# Patient Record
Sex: FEMALE | Race: WHITE | Employment: OTHER | URBAN - METROPOLITAN AREA
[De-identification: names, ages, dates, MRNs, and addresses within clinical notes are randomized per-mention and may not be internally consistent; named-entity substitution may affect disease eponyms.]

---

## 2017-03-15 ENCOUNTER — OFFICE VISIT (OUTPATIENT)
Dept: ORTHOPEDIC SURGERY | Age: 82
End: 2017-03-15

## 2017-03-15 VITALS — HEIGHT: 65 IN | RESPIRATION RATE: 16 BRPM | WEIGHT: 119 LBS | BODY MASS INDEX: 19.83 KG/M2

## 2017-03-15 DIAGNOSIS — S72.002D LEFT DISPLACED FEMORAL NECK FRACTURE, CLOSED, WITH ROUTINE HEALING, SUBSEQUENT ENCOUNTER: Primary | ICD-10-CM

## 2017-03-15 PROCEDURE — 4040F PNEUMOC VAC/ADMIN/RCVD: CPT | Performed by: ORTHOPAEDIC SURGERY

## 2017-03-15 PROCEDURE — 1123F ACP DISCUSS/DSCN MKR DOCD: CPT | Performed by: ORTHOPAEDIC SURGERY

## 2017-03-15 PROCEDURE — G8419 CALC BMI OUT NRM PARAM NOF/U: HCPCS | Performed by: ORTHOPAEDIC SURGERY

## 2017-03-15 PROCEDURE — G8427 DOCREV CUR MEDS BY ELIG CLIN: HCPCS | Performed by: ORTHOPAEDIC SURGERY

## 2017-03-15 PROCEDURE — G8484 FLU IMMUNIZE NO ADMIN: HCPCS | Performed by: ORTHOPAEDIC SURGERY

## 2017-03-15 PROCEDURE — 73502 X-RAY EXAM HIP UNI 2-3 VIEWS: CPT | Performed by: ORTHOPAEDIC SURGERY

## 2017-03-15 PROCEDURE — 99213 OFFICE O/P EST LOW 20 MIN: CPT | Performed by: ORTHOPAEDIC SURGERY

## 2017-03-15 PROCEDURE — 1090F PRES/ABSN URINE INCON ASSESS: CPT | Performed by: ORTHOPAEDIC SURGERY

## 2017-03-15 PROCEDURE — 1036F TOBACCO NON-USER: CPT | Performed by: ORTHOPAEDIC SURGERY

## 2017-04-13 ENCOUNTER — HOSPITAL ENCOUNTER (OUTPATIENT)
Dept: WOMENS IMAGING | Age: 82
Discharge: HOME | End: 2017-04-13
Attending: OBSTETRICS & GYNECOLOGY | Admitting: OBSTETRICS & GYNECOLOGY

## 2017-04-13 DIAGNOSIS — Z12.31 VISIT FOR SCREENING MAMMOGRAM: ICD-10-CM

## 2017-08-30 ENCOUNTER — OFFICE VISIT (OUTPATIENT)
Dept: ORTHOPEDIC SURGERY | Age: 82
End: 2017-08-30

## 2017-08-30 VITALS
WEIGHT: 105 LBS | DIASTOLIC BLOOD PRESSURE: 61 MMHG | SYSTOLIC BLOOD PRESSURE: 159 MMHG | HEIGHT: 65 IN | HEART RATE: 67 BPM | RESPIRATION RATE: 16 BRPM | BODY MASS INDEX: 17.49 KG/M2

## 2017-08-30 DIAGNOSIS — S72.002A LEFT DISPLACED FEMORAL NECK FRACTURE, CLOSED, INITIAL ENCOUNTER: Primary | ICD-10-CM

## 2017-08-30 PROCEDURE — 1036F TOBACCO NON-USER: CPT | Performed by: ORTHOPAEDIC SURGERY

## 2017-08-30 PROCEDURE — 4040F PNEUMOC VAC/ADMIN/RCVD: CPT | Performed by: ORTHOPAEDIC SURGERY

## 2017-08-30 PROCEDURE — G8419 CALC BMI OUT NRM PARAM NOF/U: HCPCS | Performed by: ORTHOPAEDIC SURGERY

## 2017-08-30 PROCEDURE — 1090F PRES/ABSN URINE INCON ASSESS: CPT | Performed by: ORTHOPAEDIC SURGERY

## 2017-08-30 PROCEDURE — 99213 OFFICE O/P EST LOW 20 MIN: CPT | Performed by: ORTHOPAEDIC SURGERY

## 2017-08-30 PROCEDURE — 1123F ACP DISCUSS/DSCN MKR DOCD: CPT | Performed by: ORTHOPAEDIC SURGERY

## 2017-08-30 PROCEDURE — G8427 DOCREV CUR MEDS BY ELIG CLIN: HCPCS | Performed by: ORTHOPAEDIC SURGERY

## 2019-06-10 ENCOUNTER — TELEPHONE (OUTPATIENT)
Dept: ORTHOPEDIC SURGERY | Age: 84
End: 2019-06-10

## 2019-06-10 NOTE — TELEPHONE ENCOUNTER
Shonna bashir/Dr. Berger People needs to know if patient needs an antibiotic prior to dental work. She is requesting that antibiotic type and directions be faxed to her.     Fax: [phone]    Questions call Shonna:  [phone]

## 2019-06-10 NOTE — TELEPHONE ENCOUNTER
Called and spoke to  Ilir at dental office. Informed her patient does not need abx prior to procedure.  Letter created and faxed to number below

## 2019-06-10 NOTE — LETTER
[facility]  [address] [facility] ([facility]) [address]  Phone: [phone]  Fax: [phone]    Rich Milligan MD        Emmie 10, 2019      To whom it may concern,. It is my medical opinion that [place] Ally does not need an antibiotic prior to having a dental procedure. If there are any questions please do not hesitate to contact our office.       Sincerely,    Rich Milligan MD

## 2020-01-02 ENCOUNTER — HOSPITAL ENCOUNTER (INPATIENT)
Age: 85
LOS: 4 days | Discharge: HOME | DRG: 193 | End: 2020-01-06
Attending: INTERNAL MEDICINE | Admitting: INTERNAL MEDICINE
Payer: MEDICARE

## 2020-01-02 ENCOUNTER — APPOINTMENT (OUTPATIENT)
Dept: GENERAL RADIOLOGY | Age: 85
DRG: 193 | End: 2020-01-02
Payer: MEDICARE

## 2020-01-02 PROBLEM — J15.9 COMMUNITY ACQUIRED BACTERIAL PNEUMONIA: Status: ACTIVE | Noted: 2020-01-02

## 2020-01-02 LAB
ANION GAP SERPL CALCULATED.3IONS-SCNC: 20 MMOL/L (ref 3–16)
BASOPHILS ABSOLUTE: 0 K/UL (ref 0–0.2)
BASOPHILS RELATIVE PERCENT: 0.2 %
BUN BLDV-MCNC: 15 MG/DL (ref 7–20)
CALCIUM SERPL-MCNC: 9.3 MG/DL (ref 8.3–10.6)
CHLORIDE BLD-SCNC: 98 MMOL/L (ref 99–110)
CO2: 16 MMOL/L (ref 21–32)
CREAT SERPL-MCNC: 0.6 MG/DL (ref 0.6–1.2)
EKG ATRIAL RATE: 113 BPM
EKG DIAGNOSIS: NORMAL
EKG P AXIS: 58 DEGREES
EKG P-R INTERVAL: 172 MS
EKG Q-T INTERVAL: 358 MS
EKG QRS DURATION: 92 MS
EKG QTC CALCULATION (BAZETT): 491 MS
EKG R AXIS: -27 DEGREES
EKG T AXIS: 23 DEGREES
EKG VENTRICULAR RATE: 113 BPM
EOSINOPHILS ABSOLUTE: 0 K/UL (ref 0–0.6)
EOSINOPHILS RELATIVE PERCENT: 0.2 %
GFR AFRICAN AMERICAN: >60
GFR NON-AFRICAN AMERICAN: >60
GLUCOSE BLD-MCNC: 101 MG/DL (ref 70–99)
HCT VFR BLD CALC: 39 % (ref 36–48)
HEMOGLOBIN: 12.9 G/DL (ref 12–16)
LYMPHOCYTES ABSOLUTE: 0.8 K/UL (ref 1–5.1)
LYMPHOCYTES RELATIVE PERCENT: 10.1 %
MAGNESIUM: 2 MG/DL (ref 1.8–2.4)
MCH RBC QN AUTO: 31.1 PG (ref 26–34)
MCHC RBC AUTO-ENTMCNC: 33.1 G/DL (ref 31–36)
MCV RBC AUTO: 94.2 FL (ref 80–100)
MONOCYTES ABSOLUTE: 0.9 K/UL (ref 0–1.3)
MONOCYTES RELATIVE PERCENT: 12.1 %
NEUTROPHILS ABSOLUTE: 6 K/UL (ref 1.7–7.7)
NEUTROPHILS RELATIVE PERCENT: 77.4 %
PDW BLD-RTO: 14.4 % (ref 12.4–15.4)
PLATELET # BLD: 237 K/UL (ref 135–450)
PMV BLD AUTO: 7.2 FL (ref 5–10.5)
POTASSIUM REFLEX MAGNESIUM: 3.4 MMOL/L (ref 3.5–5.1)
PRO-BNP: 661 PG/ML (ref 0–449)
RBC # BLD: 4.15 M/UL (ref 4–5.2)
SODIUM BLD-SCNC: 134 MMOL/L (ref 136–145)
TROPONIN: <0.01 NG/ML
WBC # BLD: 7.8 K/UL (ref 4–11)

## 2020-01-02 PROCEDURE — 93010 ELECTROCARDIOGRAM REPORT: CPT | Performed by: INTERNAL MEDICINE

## 2020-01-02 PROCEDURE — 96365 THER/PROPH/DIAG IV INF INIT: CPT

## 2020-01-02 PROCEDURE — 80048 BASIC METABOLIC PNL TOTAL CA: CPT

## 2020-01-02 PROCEDURE — 6370000000 HC RX 637 (ALT 250 FOR IP): Performed by: NURSE PRACTITIONER

## 2020-01-02 PROCEDURE — 83735 ASSAY OF MAGNESIUM: CPT

## 2020-01-02 PROCEDURE — 96367 TX/PROPH/DG ADDL SEQ IV INF: CPT

## 2020-01-02 PROCEDURE — 83880 ASSAY OF NATRIURETIC PEPTIDE: CPT

## 2020-01-02 PROCEDURE — 2580000003 HC RX 258: Performed by: NURSE PRACTITIONER

## 2020-01-02 PROCEDURE — 94640 AIRWAY INHALATION TREATMENT: CPT

## 2020-01-02 PROCEDURE — 2580000003 HC RX 258: Performed by: INTERNAL MEDICINE

## 2020-01-02 PROCEDURE — 6360000002 HC RX W HCPCS: Performed by: NURSE PRACTITIONER

## 2020-01-02 PROCEDURE — 1200000000 HC SEMI PRIVATE

## 2020-01-02 PROCEDURE — 96375 TX/PRO/DX INJ NEW DRUG ADDON: CPT

## 2020-01-02 PROCEDURE — 71046 X-RAY EXAM CHEST 2 VIEWS: CPT

## 2020-01-02 PROCEDURE — 93005 ELECTROCARDIOGRAM TRACING: CPT | Performed by: EMERGENCY MEDICINE

## 2020-01-02 PROCEDURE — 84484 ASSAY OF TROPONIN QUANT: CPT

## 2020-01-02 PROCEDURE — 6360000002 HC RX W HCPCS: Performed by: INTERNAL MEDICINE

## 2020-01-02 PROCEDURE — 85025 COMPLETE CBC W/AUTO DIFF WBC: CPT

## 2020-01-02 PROCEDURE — 87252 VIRUS INOCULATION TISSUE: CPT

## 2020-01-02 PROCEDURE — 99285 EMERGENCY DEPT VISIT HI MDM: CPT

## 2020-01-02 PROCEDURE — 6370000000 HC RX 637 (ALT 250 FOR IP): Performed by: INTERNAL MEDICINE

## 2020-01-02 RX ORDER — SODIUM CHLORIDE 0.9 % (FLUSH) 0.9 %
10 SYRINGE (ML) INJECTION PRN
Status: DISCONTINUED | OUTPATIENT
Start: 2020-01-02 | End: 2020-01-06 | Stop reason: HOSPADM

## 2020-01-02 RX ORDER — SODIUM CHLORIDE 0.9 % (FLUSH) 0.9 %
10 SYRINGE (ML) INJECTION EVERY 12 HOURS SCHEDULED
Status: DISCONTINUED | OUTPATIENT
Start: 2020-01-02 | End: 2020-01-06 | Stop reason: HOSPADM

## 2020-01-02 RX ORDER — SIMVASTATIN 20 MG
20 TABLET ORAL NIGHTLY
Status: DISCONTINUED | OUTPATIENT
Start: 2020-01-02 | End: 2020-01-06 | Stop reason: HOSPADM

## 2020-01-02 RX ORDER — LEVOFLOXACIN 5 MG/ML
750 INJECTION, SOLUTION INTRAVENOUS
Status: DISCONTINUED | OUTPATIENT
Start: 2020-01-03 | End: 2020-01-03

## 2020-01-02 RX ORDER — TIMOLOL MALEATE 6.8 MG/ML
1 SOLUTION/ DROPS OPHTHALMIC 2 TIMES DAILY
Status: ON HOLD | COMMUNITY
End: 2021-11-13

## 2020-01-02 RX ORDER — LISINOPRIL 10 MG/1
20 TABLET ORAL DAILY
Status: DISCONTINUED | OUTPATIENT
Start: 2020-01-03 | End: 2020-01-06 | Stop reason: HOSPADM

## 2020-01-02 RX ORDER — BENAZEPRIL HYDROCHLORIDE 20 MG/1
20 TABLET ORAL DAILY
COMMUNITY
Start: 2019-11-07

## 2020-01-02 RX ORDER — IPRATROPIUM BROMIDE AND ALBUTEROL SULFATE 2.5; .5 MG/3ML; MG/3ML
1 SOLUTION RESPIRATORY (INHALATION) ONCE
Status: COMPLETED | OUTPATIENT
Start: 2020-01-02 | End: 2020-01-02

## 2020-01-02 RX ORDER — TITANIUM DIOXIDE, OCTINOXATE, ZINC OXIDE 4.61; 1.6; .78 G/40ML; G/40ML; G/40ML
1 CREAM TOPICAL DAILY
COMMUNITY
End: 2020-07-09

## 2020-01-02 RX ORDER — BIMATOPROST 0.01 %
1 DROPS OPHTHALMIC (EYE) EVERY EVENING
COMMUNITY
Start: 2019-11-04

## 2020-01-02 RX ORDER — LEVOFLOXACIN 5 MG/ML
750 INJECTION, SOLUTION INTRAVENOUS EVERY 24 HOURS
Status: DISCONTINUED | OUTPATIENT
Start: 2020-01-03 | End: 2020-01-02 | Stop reason: DRUGHIGH

## 2020-01-02 RX ORDER — AMLODIPINE BESYLATE 5 MG/1
5 TABLET ORAL DAILY
Status: DISCONTINUED | OUTPATIENT
Start: 2020-01-02 | End: 2020-01-06 | Stop reason: HOSPADM

## 2020-01-02 RX ORDER — LEVOTHYROXINE SODIUM 0.07 MG/1
75 TABLET ORAL DAILY
Status: DISCONTINUED | OUTPATIENT
Start: 2020-01-02 | End: 2020-01-06 | Stop reason: HOSPADM

## 2020-01-02 RX ORDER — ONDANSETRON 2 MG/ML
4 INJECTION INTRAMUSCULAR; INTRAVENOUS EVERY 6 HOURS PRN
Status: DISCONTINUED | OUTPATIENT
Start: 2020-01-02 | End: 2020-01-06 | Stop reason: HOSPADM

## 2020-01-02 RX ORDER — METHYLPREDNISOLONE SODIUM SUCCINATE 125 MG/2ML
60 INJECTION, POWDER, LYOPHILIZED, FOR SOLUTION INTRAMUSCULAR; INTRAVENOUS ONCE
Status: COMPLETED | OUTPATIENT
Start: 2020-01-02 | End: 2020-01-02

## 2020-01-02 RX ORDER — ALBUTEROL SULFATE 2.5 MG/3ML
2.5 SOLUTION RESPIRATORY (INHALATION)
Status: DISCONTINUED | OUTPATIENT
Start: 2020-01-02 | End: 2020-01-06 | Stop reason: HOSPADM

## 2020-01-02 RX ORDER — ASPIRIN 81 MG/1
81 TABLET, CHEWABLE ORAL DAILY
Status: DISCONTINUED | OUTPATIENT
Start: 2020-01-02 | End: 2020-01-06 | Stop reason: HOSPADM

## 2020-01-02 RX ORDER — NETARSUDIL 0.2 MG/ML
1 SOLUTION/ DROPS OPHTHALMIC; TOPICAL NIGHTLY
Status: ON HOLD | COMMUNITY
Start: 2020-01-02 | End: 2021-11-14

## 2020-01-02 RX ADMIN — ASPIRIN 81 MG 81 MG: 81 TABLET ORAL at 18:45

## 2020-01-02 RX ADMIN — SODIUM CHLORIDE, PRESERVATIVE FREE 10 ML: 5 INJECTION INTRAVENOUS at 20:52

## 2020-01-02 RX ADMIN — METHYLPREDNISOLONE SODIUM SUCCINATE 60 MG: 125 INJECTION, POWDER, FOR SOLUTION INTRAMUSCULAR; INTRAVENOUS at 12:39

## 2020-01-02 RX ADMIN — AZITHROMYCIN MONOHYDRATE 500 MG: 500 INJECTION, POWDER, LYOPHILIZED, FOR SOLUTION INTRAVENOUS at 13:37

## 2020-01-02 RX ADMIN — IPRATROPIUM BROMIDE AND ALBUTEROL SULFATE 1 AMPULE: .5; 3 SOLUTION RESPIRATORY (INHALATION) at 13:11

## 2020-01-02 RX ADMIN — SIMVASTATIN 20 MG: 20 TABLET, FILM COATED ORAL at 20:50

## 2020-01-02 RX ADMIN — Medication 10 ML: at 20:50

## 2020-01-02 RX ADMIN — LEVOTHYROXINE SODIUM 75 MCG: 75 TABLET ORAL at 18:45

## 2020-01-02 RX ADMIN — AMLODIPINE BESYLATE 5 MG: 5 TABLET ORAL at 18:45

## 2020-01-02 RX ADMIN — ENOXAPARIN SODIUM 40 MG: 40 INJECTION SUBCUTANEOUS at 20:49

## 2020-01-02 RX ADMIN — MAGNESIUM HYDROXIDE 30 ML: 400 SUSPENSION ORAL at 21:41

## 2020-01-02 RX ADMIN — CEFTRIAXONE 1 G: 1 INJECTION, POWDER, FOR SOLUTION INTRAMUSCULAR; INTRAVENOUS at 12:54

## 2020-01-02 ASSESSMENT — ENCOUNTER SYMPTOMS
COUGH: 1
GASTROINTESTINAL NEGATIVE: 1
SHORTNESS OF BREATH: 1

## 2020-01-02 ASSESSMENT — PAIN SCALES - GENERAL
PAINLEVEL_OUTOF10: 0
PAINLEVEL_OUTOF10: 0

## 2020-01-02 NOTE — PLAN OF CARE
Problem: Falls - Risk of:  Goal: Will remain free from falls  Description  Will remain free from falls  Outcome: Ongoing  Goal: Absence of physical injury  Description  Absence of physical injury  Outcome: Ongoing     Problem: Discharge Planning:  Goal: Discharged to appropriate level of care  Description  Discharged to appropriate level of care  Outcome: Ongoing     Problem: Skin Integrity:  Goal: Will show no infection signs and symptoms  Description  Will show no infection signs and symptoms  Outcome: Ongoing  Goal: Absence of new skin breakdown  Description  Absence of new skin breakdown  Outcome: Ongoing

## 2020-01-02 NOTE — H&P
Hospital Medicine History & Physical      PCP: Codey Abdi MD    Date of Admission: 1/2/2020    Date of Service: Pt seen/examined on 1/2/20 and Admitted to Inpatient  Chief Complaint:  sob      History Of Present Illness: The patient is a [address] y.o. female who presents to [facility] with acute onset and progressive course of sob. Sob started one week ago sob worse with exertion no relieving factors sob associated with cough no fever chills cp     Past Medical History:        Diagnosis Date    Arthritis     Glaucoma     Hyperlipidemia     Hypertension     TIA (transient ischemic attack)        Past Surgical History:        Procedure Laterality Date    BREAST SURGERY      left lumpectomy    HIP ARTHROPLASTY Left 08/26/2016    hemiarthroplasty left hip    THYROIDECTOMY, PARTIAL      VARICOSE VEIN SURGERY         Medications Prior to Admission:    Prior to Admission medications    Medication Sig Start Date End Date Taking? Authorizing Provider   ferrous sulfate 324 (65 FE) MG EC tablet Take 1 tablet by mouth daily (with breakfast) 9/7/16   Junior High Lina MD   amLODIPine (NORVASC) 5 MG tablet Take 1 tablet by mouth daily 8/28/16   Laila Hanley MD   lisinopril (PRINIVIL;ZESTRIL) 20 MG tablet Take 1 tablet by mouth daily 8/28/16   Laila Hanley MD   docusate sodium (COLACE, DULCOLAX) 100 MG CAPS Take 100 mg by mouth 2 times daily 8/28/16   Laila Hanley MD   latanoprost (XALATAN) 0.005 % ophthalmic solution Place 1 drop into the left eye nightly 8/28/16   Laila Hanley MD   levothyroxine (SYNTHROID) 75 MCG tablet Take 75 mcg by mouth Daily    Historical Provider, MD   clobetasol (TEMOVATE) 0.05 % cream Apply topically daily Apply topically 2 times daily. Historical Provider, MD   aspirin 81 MG tablet Take 81 mg by mouth daily.

## 2020-01-02 NOTE — ED PROVIDER NOTES
LABS:  Labs Reviewed   CBC WITH AUTO DIFFERENTIAL - Abnormal; Notable for the following components:       Result Value    Lymphocytes Absolute 0.8 (*)     All other components within normal limits    Narrative:     Performed at:  [facility]  [address] [facility] [place] [place] [employer] [address]   Phone [phone]   BASIC METABOLIC PANEL W/ REFLEX TO MG FOR LOW K - Abnormal; Notable for the following components:    Sodium 134 (*)     Potassium reflex Magnesium 3.4 (*)     Chloride 98 (*)     CO2 16 (*)     Anion Gap 20 (*)     Glucose 101 (*)     All other components within normal limits    Narrative:     Performed at:  [facility]  [address] [facility] [place] [place] [employer] [address]   Phone [phone]   BRAIN NATRIURETIC PEPTIDE - Abnormal; Notable for the following components:    Pro- (*)     All other components within normal limits    Narrative:     Performed at:  [address] [place] [place] [employer] [address]   Phone [phone]   RESP VIRUSES DFA PANEL   CULTURE, VIRAL RESPIRATORY   TROPONIN    Narrative:     Performed at:  [address] [place] [employer] [address]   Phone [phone]   MAGNESIUM    Narrative:     Performed at:  [address] [place] [address] [place] [employer] [address]   Phone [phone]       All other labs were within normal range or not returned as of this dictation.     EMERGENCY DEPARTMENT COURSE and DIFFERENTIAL DIAGNOSIS/MDM:   Vitals:    Vitals:    01/02/20 1231 01/02/20 1247 01/02/20 1301 01/02/20 1311   BP: (!) 135/91 (!) 156/86 (!) 165/92    Pulse: 114 108 109    Resp: 18 20 19 20   Temp:       TempSrc:       SpO2: 91% 94% 96% 96%     Medications   azithromycin (ZITHROMAX) 500 mg in D5W 250ml addavial (has no administration in time range)   ipratropium-albuterol (DUONEB) nebulizer solution 1 ampule (1 ampule Inhalation Given 1/2/20 1311)   cefTRIAXone (ROCEPHIN) 1 g IVPB in 50 mL D5W minibag (1 g Intravenous New Bag 1/2/20 1254)   methylPREDNISolone sodium (SOLU-MEDROL) injection 60 mg (60 mg Intravenous Given 1/2/20 1239)       MDM   Patient was seen and evaluated per myself. Dr. Kashmir Reed present available for consultation as needed. On clinical exam the patient is awake alert and oriented. Slightly tachypneic, on 2 L of oxygen nasal cannula satting 95 to 96% however during conversation the O2 sat will drop to the level of 90-91. On room air she was 88%. Lung fields positive for coarse rhonchi in the left lower base and slightly present in the right lower base. She has a congested barking cough. Abdomen soft nontender nondistended. She is afebrile mildly tachycardic narrow complex. Differential diagnosis: RSV, pertussis, pneumonia, sepsis, influenza    Rapid influenza swab and respiratory viral panel swabs have been ordered, will be processed. Nebulizer, azithromycin, Rocephin for community-acquired pneumonia as well as Solu-Medrol 60 mg IV is also been ordered. Chest x-ray is positive for a left lower lobe complex bullae, has been present in the past in 2006 however there may be a component of infection with that as well. Given her tachycardia, cough, hypoxia I will treat as a community-acquired pneumonia. White blood cell count within normal limits. Troponin less than 0.01, magnesium normal.  Metabolic panel: Bicarb 16, anion gap 20. CURB 65: score: 1 (age)  PSI score: 100 points (increased complication and mortality risk)    Based on the patient's clinical presentation, age and increased risk for mortality and complication I will consult with hospitalist service for admission. This dictation was performed with a verbal recognition program (DRAGON) and it was checked for errors. It is possible that there are still dictated errors within this office note.  If so, please bring any errors to my attention for an addendum. All efforts were made to ensure that this office note is accurate. CONSULTS:  IP CONSULT TO HOSPITALIST    PROCEDURES:  Procedures    FINAL IMPRESSION      1. Pneumonia of left lower lobe due to infectious organism ([address]    2. Hypoxia    3. Tachycardia    4. Cough    5. Shortness of breath          DISPOSITION/PLAN   [unfilled]    PATIENT REFERRED TO:  No follow-up provider specified.     DISCHARGE MEDICATIONS:  New Prescriptions    No medications on file       (Please note that portions of this note were completed with a voice recognition program.  Efforts were made to edit the dictations but occasionally words are mis-transcribed.)    Amada Quiles, [address], APRN - [facility]  01/02/20 3968

## 2020-01-03 PROBLEM — J18.9 COMMUNITY ACQUIRED PNEUMONIA: Status: ACTIVE | Noted: 2020-01-02

## 2020-01-03 PROBLEM — E87.29 HIGH ANION GAP METABOLIC ACIDOSIS: Status: ACTIVE | Noted: 2020-01-03

## 2020-01-03 PROBLEM — J96.01 ACUTE RESPIRATORY FAILURE WITH HYPOXIA (HCC): Status: ACTIVE | Noted: 2020-01-03

## 2020-01-03 LAB
ANION GAP SERPL CALCULATED.3IONS-SCNC: 14 MMOL/L (ref 3–16)
BASOPHILS ABSOLUTE: 0 K/UL (ref 0–0.2)
BASOPHILS RELATIVE PERCENT: 0.1 %
BUN BLDV-MCNC: 20 MG/DL (ref 7–20)
CALCIUM SERPL-MCNC: 8.9 MG/DL (ref 8.3–10.6)
CHLORIDE BLD-SCNC: 98 MMOL/L (ref 99–110)
CO2: 23 MMOL/L (ref 21–32)
CREAT SERPL-MCNC: 0.7 MG/DL (ref 0.6–1.2)
EOSINOPHILS ABSOLUTE: 0 K/UL (ref 0–0.6)
EOSINOPHILS RELATIVE PERCENT: 0.1 %
GFR AFRICAN AMERICAN: >60
GFR NON-AFRICAN AMERICAN: >60
GLUCOSE BLD-MCNC: 102 MG/DL (ref 70–99)
HCT VFR BLD CALC: 36.1 % (ref 36–48)
HEMOGLOBIN: 11.9 G/DL (ref 12–16)
LYMPHOCYTES ABSOLUTE: 1.2 K/UL (ref 1–5.1)
LYMPHOCYTES RELATIVE PERCENT: 11.7 %
MCH RBC QN AUTO: 31 PG (ref 26–34)
MCHC RBC AUTO-ENTMCNC: 33 G/DL (ref 31–36)
MCV RBC AUTO: 93.8 FL (ref 80–100)
MONOCYTES ABSOLUTE: 1.2 K/UL (ref 0–1.3)
MONOCYTES RELATIVE PERCENT: 11.9 %
NEUTROPHILS ABSOLUTE: 7.8 K/UL (ref 1.7–7.7)
NEUTROPHILS RELATIVE PERCENT: 76.2 %
PDW BLD-RTO: 14.5 % (ref 12.4–15.4)
PLATELET # BLD: 238 K/UL (ref 135–450)
PMV BLD AUTO: 7.5 FL (ref 5–10.5)
POTASSIUM SERPL-SCNC: 3.2 MMOL/L (ref 3.5–5.1)
RBC # BLD: 3.84 M/UL (ref 4–5.2)
SODIUM BLD-SCNC: 135 MMOL/L (ref 136–145)
WBC # BLD: 10.2 K/UL (ref 4–11)

## 2020-01-03 PROCEDURE — 36415 COLL VENOUS BLD VENIPUNCTURE: CPT

## 2020-01-03 PROCEDURE — 99223 1ST HOSP IP/OBS HIGH 75: CPT | Performed by: INTERNAL MEDICINE

## 2020-01-03 PROCEDURE — 97530 THERAPEUTIC ACTIVITIES: CPT

## 2020-01-03 PROCEDURE — 1200000000 HC SEMI PRIVATE

## 2020-01-03 PROCEDURE — 6370000000 HC RX 637 (ALT 250 FOR IP): Performed by: INTERNAL MEDICINE

## 2020-01-03 PROCEDURE — 2700000000 HC OXYGEN THERAPY PER DAY

## 2020-01-03 PROCEDURE — 97161 PT EVAL LOW COMPLEX 20 MIN: CPT

## 2020-01-03 PROCEDURE — 94761 N-INVAS EAR/PLS OXIMETRY MLT: CPT

## 2020-01-03 PROCEDURE — 80048 BASIC METABOLIC PNL TOTAL CA: CPT

## 2020-01-03 PROCEDURE — 6360000002 HC RX W HCPCS: Performed by: INTERNAL MEDICINE

## 2020-01-03 PROCEDURE — 85025 COMPLETE CBC W/AUTO DIFF WBC: CPT

## 2020-01-03 PROCEDURE — 2580000003 HC RX 258: Performed by: INTERNAL MEDICINE

## 2020-01-03 RX ORDER — CALCIUM CARBONATE 200(500)MG
500 TABLET,CHEWABLE ORAL 3 TIMES DAILY PRN
Status: DISCONTINUED | OUTPATIENT
Start: 2020-01-03 | End: 2020-01-06 | Stop reason: HOSPADM

## 2020-01-03 RX ORDER — MONTELUKAST SODIUM 10 MG/1
10 TABLET ORAL NIGHTLY
Status: DISCONTINUED | OUTPATIENT
Start: 2020-01-03 | End: 2020-01-06 | Stop reason: HOSPADM

## 2020-01-03 RX ORDER — DORZOLAMIDE HCL 20 MG/ML
1 SOLUTION/ DROPS OPHTHALMIC 3 TIMES DAILY
Status: DISCONTINUED | OUTPATIENT
Start: 2020-01-03 | End: 2020-01-03

## 2020-01-03 RX ORDER — DOXYCYCLINE HYCLATE 100 MG
100 TABLET ORAL EVERY 12 HOURS SCHEDULED
Status: DISCONTINUED | OUTPATIENT
Start: 2020-01-03 | End: 2020-01-06 | Stop reason: HOSPADM

## 2020-01-03 RX ORDER — POTASSIUM CHLORIDE 750 MG/1
40 TABLET, FILM COATED, EXTENDED RELEASE ORAL ONCE
Status: COMPLETED | OUTPATIENT
Start: 2020-01-03 | End: 2020-01-03

## 2020-01-03 RX ORDER — FLUTICASONE PROPIONATE 50 MCG
2 SPRAY, SUSPENSION (ML) NASAL DAILY
Status: DISCONTINUED | OUTPATIENT
Start: 2020-01-03 | End: 2020-01-06 | Stop reason: HOSPADM

## 2020-01-03 RX ORDER — DORZOLAMIDE HYDROCHLORIDE AND TIMOLOL MALEATE 20; 5 MG/ML; MG/ML
1 SOLUTION/ DROPS OPHTHALMIC 2 TIMES DAILY
Status: DISCONTINUED | OUTPATIENT
Start: 2020-01-03 | End: 2020-01-06 | Stop reason: HOSPADM

## 2020-01-03 RX ADMIN — DOXYCYCLINE HYCLATE 100 MG: 100 TABLET, COATED ORAL at 20:53

## 2020-01-03 RX ADMIN — LEVOFLOXACIN 750 MG: 5 INJECTION, SOLUTION INTRAVENOUS at 06:27

## 2020-01-03 RX ADMIN — SIMVASTATIN 20 MG: 20 TABLET, FILM COATED ORAL at 20:49

## 2020-01-03 RX ADMIN — SODIUM CHLORIDE, PRESERVATIVE FREE 10 ML: 5 INJECTION INTRAVENOUS at 08:32

## 2020-01-03 RX ADMIN — DORZOLAMIDE HYDROCHLORIDE AND TIMOLOL MALEATE 1 DROP: 20; 5 SOLUTION/ DROPS OPHTHALMIC at 20:51

## 2020-01-03 RX ADMIN — ENOXAPARIN SODIUM 40 MG: 40 INJECTION SUBCUTANEOUS at 20:49

## 2020-01-03 RX ADMIN — DOXYCYCLINE HYCLATE 100 MG: 100 TABLET, COATED ORAL at 11:34

## 2020-01-03 RX ADMIN — LISINOPRIL 20 MG: 10 TABLET ORAL at 08:31

## 2020-01-03 RX ADMIN — ANTACID TABLETS 500 MG: 500 TABLET, CHEWABLE ORAL at 16:29

## 2020-01-03 RX ADMIN — MONTELUKAST SODIUM 10 MG: 10 TABLET, FILM COATED ORAL at 20:49

## 2020-01-03 RX ADMIN — CEFTRIAXONE 1 G: 1 INJECTION, POWDER, FOR SOLUTION INTRAMUSCULAR; INTRAVENOUS at 11:34

## 2020-01-03 RX ADMIN — AMLODIPINE BESYLATE 5 MG: 5 TABLET ORAL at 08:31

## 2020-01-03 RX ADMIN — POTASSIUM CHLORIDE 40 MEQ: 750 TABLET, FILM COATED, EXTENDED RELEASE ORAL at 12:20

## 2020-01-03 RX ADMIN — LEVOTHYROXINE SODIUM 75 MCG: 75 TABLET ORAL at 06:27

## 2020-01-03 RX ADMIN — SODIUM CHLORIDE, PRESERVATIVE FREE 10 ML: 5 INJECTION INTRAVENOUS at 20:48

## 2020-01-03 RX ADMIN — FLUTICASONE PROPIONATE 2 SPRAY: 50 SPRAY, METERED NASAL at 13:45

## 2020-01-03 RX ADMIN — DORZOLAMIDE HYDROCHLORIDE 1 DROP: 20 SOLUTION/ DROPS OPHTHALMIC at 13:40

## 2020-01-03 RX ADMIN — ASPIRIN 81 MG 81 MG: 81 TABLET ORAL at 08:31

## 2020-01-03 ASSESSMENT — PAIN SCALES - GENERAL: PAINLEVEL_OUTOF10: 0

## 2020-01-03 NOTE — CARE COORDINATION
INITIAL CASE MANAGEMENT ASSESSMENT    Reviewed chart, met with patient to assess possible discharge needs. Explained Case Management role/services. SW met with patient alone at bedside     Living Situation: Patient reports that she resides in a single family home alone. She reports that she has no pets and there is one stair leading up to the home from the garage. She stated that she has no trouble getting into or out of the home. ADLs: Patient reports that prior to medical admission she was independent. She reports that she does not anticipate any needs going home. DME: Patient reports that she has a cane, grab bars and a shower chair. She reports that she does not anticipate any needs going home. PT/OT Recs: None noted. Patient reports that since medical admission she has been ambulating with assistance. SW to order a PT/OT consult for safety before patient is discharged. SW to follow up after recommendations are made. Active Services: None noted. Patient stated that she does not anticipate any needs going home. Transportation: Patient reports that her children drive her to and from medical appointments and errands. She stated that they will transport her home at discharge. Medications: Patient reports that she currently receives medications from [facility]. She stated that she does not have issues with access or affordability. PCP: Martir Lawrence [phone] (phone) and [phone] (fax number). She reports that her last appointment with this provider was almost two years ago. She stated that she needed to coordinate her follow up appointment around her children's schedule so she doesn't want our help at this time. PLAN/COMMENTS:   1) PT/OT consult and follow up with recommendations. SW provided contact information for patient or family to call with any questions. SW will follow and assist as psychosocial needs arise.      Respectfully submitted,    Christy CANNON, CHERYL-S  [facility] [address]   [phone]    Electronically signed by PATRICK Branch on 1/3/2020 at 3:27 PM

## 2020-01-03 NOTE — PLAN OF CARE
Problem: Falls - Risk of:  Goal: Will remain free from falls  Description  Will remain free from falls  1/3/2020 0049 by Belkis Huang RN  Outcome: Ongoing     Problem: Skin Integrity:  Goal: Will show no infection signs and symptoms  Description  Will show no infection signs and symptoms  1/3/2020 0049 by Belkis Huang RN  Outcome: Ongoing     Problem: Skin Integrity:  Goal: Absence of new skin breakdown  Description  Absence of new skin breakdown  1/3/2020 0049 by Belkis Huang RN  Outcome: Ongoing

## 2020-01-03 NOTE — PROGRESS NOTES
Hospitalist Progress Note      PCP: Martir Lawrence MD    Date of Admission: 1/2/2020        Subjective: still having cough, some SOB, and generalized weakness, denies fever or chills, no nausea, vomiting or muscle pain. Medications:  Reviewed    Infusion Medications   Scheduled Medications    cefTRIAXone (ROCEPHIN) IV  1 g Intravenous Q24H    doxycycline hyclate  100 mg Oral 2 times per day    amLODIPine  5 mg Oral Daily    aspirin  81 mg Oral Daily    levothyroxine  75 mcg Oral Daily    lisinopril  20 mg Oral Daily    simvastatin  20 mg Oral Nightly    sodium chloride flush  10 mL Intravenous 2 times per day    enoxaparin  40 mg Subcutaneous Nightly     PRN Meds: sodium chloride flush, magnesium hydroxide, ondansetron, albuterol      Intake/Output Summary (Last 24 hours) at 1/3/2020 1040  Last data filed at 1/3/2020 1039  Gross per 24 hour   Intake 610 ml   Output 875 ml   Net -265 ml       Physical Exam Performed:    /83   Pulse 93   Temp 97.8 °F (36.6 °C) (Oral)   Resp 16   Ht 5' 5\" (1.651 m)   Wt 103 lb 9.9 oz (47 kg)   SpO2 96%   BMI 17.24 kg/m²     General appearance: No apparent distress  Neck: Supple  Respiratory:  Scattered rhonchi   Cardiovascular: Regular rate and rhythm with normal S1/S2 without murmurs, rubs or gallops. Abdomen: Soft, non-tender  Musculoskeletal: No clubbing  Skin: Skin color, texture, turgor normal.  No rashes or lesions. Neurologic:  No focal weakness   Psychiatric: Alert and oriented  Capillary Refill: Brisk,< 3 seconds   Peripheral Pulses: +2 palpable, equal bilaterally       Labs:   Recent Labs     01/02/20  1150   WBC 7.8   HGB 12.9   HCT 39.0        Recent Labs     01/02/20  1150   *   K 3.4*   CL 98*   CO2 16*   BUN 15   CREATININE 0.6   CALCIUM 9.3     No results for input(s): AST, ALT, BILIDIR, BILITOT, ALKPHOS in the last 72 hours. No results for input(s): INR in the last 72 hours.   Recent Labs     01/02/20  1150

## 2020-01-03 NOTE — CONSULTS
pain  Gastrointestinal: Negative for vomiting, diarrhea   Genitourinary: Negative for hematuria, negative for dysuria  Musculoskeletal: Negative for arthralgias   Skin: Negative for rash  Neurological: Negative for syncope  Hematological: Negative for adenopathy  Extremities:  Negative for swelling    PHYSICAL EXAM:  Blood pressure 133/83, pulse 93, temperature 97.8 °F (36.6 °C), temperature source Oral, resp. rate 16, height 5' 5\" (1.651 m), weight 103 lb 9.9 oz (47 kg), SpO2 96 %.'  Gen: No distress. Appears listed age  Eyes: PERRL. No sclera icterus. No conjunctival injection. ENT: No discharge. Pharynx clear. Congested voice  Neck: Trachea midline. No obvious mass. Resp: SARAH crackles   CV: Regular rate. Regular rhythm. No murmur or rub. GI: Non-tender. Non-distended. No hernia. BS present. Skin: Ecchymosis, leg wrapped   Lymph: No cervical LAD. No supraclavicular LAD. M/S: No cyanosis. No joint deformity. Neuro: Awake. Alert. Moves all four extremities. EXT:   no edema, no clubbing    LABS:  CBC:   Recent Labs     01/02/20  1150   WBC 7.8   HGB 12.9   HCT 39.0   MCV 94.2        BMP:   Recent Labs     01/02/20  1150   *   K 3.4*   CL 98*   CO2 16*   BUN 15   CREATININE 0.6     LIVER PROFILE: No results for input(s): AST, ALT, LIPASE, BILIDIR, BILITOT, ALKPHOS in the last 72 hours. Invalid input(s): AMYLASE,  ALB  PT/INR: No results for input(s): PROTIME, INR in the last 72 hours. APTT: No results for input(s): APTT in the last 72 hours. UA:No results for input(s): NITRITE, COLORU, PHUR, LABCAST, WBCUA, RBCUA, MUCUS, TRICHOMONAS, YEAST, BACTERIA, CLARITYU, SPECGRAV, LEUKOCYTESUR, UROBILINOGEN, BILIRUBINUR, BLOODU, GLUCOSEU, AMORPHOUS in the last 72 hours. Invalid input(s): KETONESU  No results for input(s): PHART, RPY4JLD, PO2ART in the last 72 hours.     Cultures:  Pending     PFTs:  None       ECHO:  None     ABG:  None    Chest X-ray:  Chest imaging was reviewed by me and

## 2020-01-03 NOTE — PROGRESS NOTES
attack). has a past surgical history that includes Thyroidectomy, partial; Varicose vein surgery; Breast surgery; and Hip Arthroplasty (Left, 08/26/2016). Restrictions  Restrictions/Precautions  Restrictions/Precautions: Fall Risk  Position Activity Restriction  Other position/activity restrictions: 3 L. O2 per nc     Vision/Hearing  Vision: Impaired  Vision Exceptions: Wears glasses for reading  Hearing: Within functional limits       Subjective  General  Chart Reviewed: Yes  Additional Pertinent Hx: Per Dr. Rhea Thompson, 1/3, \"This is a 80 y.o. female who presented to the ED on 1/2 with a CC of cough. Per ED notes she has had cough, fatigue and SOB. She was hypoxic at PCP's office. She was admitted for pneumonia\. She has been having a clear to white sputum with sinus congestion. Denies feeling SOB or having any chronic lung disease. She does feel sick and has been feeling that way for a few days. Nothing has really helped her feel better. \"  Referring Practitioner: Dr. Kinsey Charles  Referral Date : 01/03/20  Diagnosis: Hypoxia; Possible PNA  Follows Commands: Within Functional Limits  Subjective  Subjective: Pt agreeable to therapy evaluation. Reports fatigue.    Pain Screening  Patient Currently in Pain: Denies    Orientation  Orientation  Overall Orientation Status: Within Normal Limits     Social/Functional History  Social/Functional History  Lives With: Alone  Type of Home: House  Home Layout: Two level, Able to Live on Main level with bedroom/bathroom  Home Access: Stairs to enter without rails  Entrance Stairs - Number of Steps: 1  Bathroom Shower/Tub: Walk-in shower  Bathroom Toilet: Standard  Bathroom Equipment: Grab bars in shower, Shower chair  Home Equipment: Cane  ADL Assistance: Independent  Homemaking Assistance: (Children assist with cleaning)  Ambulation Assistance: Independent  Transfer Assistance: Independent  Active : No  Patient's  Info: Family  Occupation: Retired  Additional Comments: Denies hx of recent falls    Objective    AROM RLE (degrees)  RLE AROM: WFL  RLE General AROM: HIp Flex, Knee Flex/Ext, and Ankle PF/DF WFL  AROM LLE (degrees)  LLE AROM : WFL  LLE General AROM: HIp Flex, Knee Flex/Ext, and Ankle PF/DF WFL  AROM RUE (degrees)  RUE AROM : WFL  RUE General AROM: Shoulder Flex, Elbow Flex/Ext WFL  AROM LUE (degrees)  LUE AROM : WFL  LUE General AROM: Shoulder Flex, Elbow Flex/Ext WFL     Strength RLE  Strength RLE: WFL  Comment: Hip Flex, Knee Ext, and Ankle DF grossly 4/5  Strength LLE  Strength LLE: WFL  Comment: Hip Flex, Knee Ext, and Ankle DF grossly 4/5  Strength RUE  Strength RUE: WFL  Comment: Shoulder Flex, Elbow Flex/Ext grossly 3+/5  Strength LUE  Strength LUE: WFL  Comment: Shoulder Flex, Elbow Flex/Ext grossly 3+/5     Tone RLE  RLE Tone: Normotonic  Tone LLE  LLE Tone: Normotonic  Motor Control  Gross Motor?: WFL     Bed mobility  Supine to Sit: Supervision  Sit to Supine: Supervision     Transfers  Sit to Stand: Contact guard assistance;Stand by assistance  Stand to sit: Contact guard assistance;Stand by assistance     Ambulation  Ambulation?: Yes  Ambulation 1  Surface: level tile  Device: Single point cane  Other Apparatus: O2  Assistance: Contact guard assistance;Stand by assistance  Quality of Gait: Pt required assist for O2 line management, and was mildly unsteady ambulating short distances in room with cane support, experiencing mild L knee buckling, but no overt LOB. She reported mild fatigue with activity. Distance: 10' x 2      Plan   Plan  Times per week: 2-3x  Specific instructions for Next Treatment: Progress endurance;  Ambulate with LRAD; improve balance  Current Treatment Recommendations: Balance Training, Functional Mobility Training, Transfer Training, Gait Training, Stair training, Neuromuscular Re-education, Home Exercise Program, Safety Education & Training, Patient/Caregiver Education & Training, Equipment Evaluation, Education, & procurement, Endurance Training  Safety Devices  Type of devices: All fall risk precautions in place, Call light within reach, Bed alarm in place, Gait belt, Left in bed  Restraints  Initially in place: No    AM-PAC Score  AM-PAC Inpatient Mobility Raw Score : 17 (01/03/20 1534)  AM-PAC Inpatient T-Scale Score : 42.13 (01/03/20 1534)  Mobility Inpatient CMS 0-100% Score: 50.57 (01/03/20 1534)  Mobility Inpatient CMS G-Code Modifier : CK (01/03/20 1534)          Goals  Short term goals  Time Frame for Short term goals: In 2-3 days pt will perform  Short term goal 1: Bed mobility Mod I  Short term goal 2: Transfers Mod I  Short term goal 3: Ambulation 48' with LRAD, Mod I  Short term goal 4: Ascend/Descend 12 steps with SBA  Long term goals  Time Frame for Long term goals : LTG = STG  Patient Goals   Patient goals : To return directly home upon D/C       Therapy Time   Individual Concurrent Group Co-treatment   Time In 1515         Time Out 1538         Minutes 23         Timed Code Treatment Minutes: 8 Minutes   Evaluation time: 15 min.      Iman Renteria PT    Electronically signed by Iman Renteria PT [id number] on 1/3/2020 at 3:39 PM

## 2020-01-04 PROCEDURE — 94760 N-INVAS EAR/PLS OXIMETRY 1: CPT

## 2020-01-04 PROCEDURE — 6370000000 HC RX 637 (ALT 250 FOR IP): Performed by: INTERNAL MEDICINE

## 2020-01-04 PROCEDURE — 1200000000 HC SEMI PRIVATE

## 2020-01-04 PROCEDURE — 97530 THERAPEUTIC ACTIVITIES: CPT

## 2020-01-04 PROCEDURE — 6360000002 HC RX W HCPCS: Performed by: INTERNAL MEDICINE

## 2020-01-04 PROCEDURE — 2580000003 HC RX 258: Performed by: INTERNAL MEDICINE

## 2020-01-04 PROCEDURE — 97165 OT EVAL LOW COMPLEX 30 MIN: CPT

## 2020-01-04 PROCEDURE — 2700000000 HC OXYGEN THERAPY PER DAY

## 2020-01-04 RX ADMIN — AMLODIPINE BESYLATE 5 MG: 5 TABLET ORAL at 08:17

## 2020-01-04 RX ADMIN — DORZOLAMIDE HYDROCHLORIDE AND TIMOLOL MALEATE 1 DROP: 20; 5 SOLUTION/ DROPS OPHTHALMIC at 08:16

## 2020-01-04 RX ADMIN — CEFTRIAXONE 1 G: 1 INJECTION, POWDER, FOR SOLUTION INTRAMUSCULAR; INTRAVENOUS at 11:26

## 2020-01-04 RX ADMIN — LEVOTHYROXINE SODIUM 75 MCG: 75 TABLET ORAL at 06:32

## 2020-01-04 RX ADMIN — SIMVASTATIN 20 MG: 20 TABLET, FILM COATED ORAL at 20:59

## 2020-01-04 RX ADMIN — ASPIRIN 81 MG 81 MG: 81 TABLET ORAL at 08:17

## 2020-01-04 RX ADMIN — LISINOPRIL 20 MG: 10 TABLET ORAL at 08:17

## 2020-01-04 RX ADMIN — DORZOLAMIDE HYDROCHLORIDE AND TIMOLOL MALEATE 1 DROP: 20; 5 SOLUTION/ DROPS OPHTHALMIC at 21:01

## 2020-01-04 RX ADMIN — DOXYCYCLINE HYCLATE 100 MG: 100 TABLET, COATED ORAL at 21:00

## 2020-01-04 RX ADMIN — ANTACID TABLETS 500 MG: 500 TABLET, CHEWABLE ORAL at 16:27

## 2020-01-04 RX ADMIN — SODIUM CHLORIDE, PRESERVATIVE FREE 10 ML: 5 INJECTION INTRAVENOUS at 08:16

## 2020-01-04 RX ADMIN — MONTELUKAST SODIUM 10 MG: 10 TABLET, FILM COATED ORAL at 20:59

## 2020-01-04 RX ADMIN — DOXYCYCLINE HYCLATE 100 MG: 100 TABLET, COATED ORAL at 08:16

## 2020-01-04 RX ADMIN — ENOXAPARIN SODIUM 40 MG: 40 INJECTION SUBCUTANEOUS at 21:00

## 2020-01-04 RX ADMIN — FLUTICASONE PROPIONATE 2 SPRAY: 50 SPRAY, METERED NASAL at 08:17

## 2020-01-04 RX ADMIN — SODIUM CHLORIDE, PRESERVATIVE FREE 10 ML: 5 INJECTION INTRAVENOUS at 21:00

## 2020-01-04 ASSESSMENT — PAIN SCALES - GENERAL: PAINLEVEL_OUTOF10: 0

## 2020-01-04 NOTE — PLAN OF CARE
Problem: Falls - Risk of:  Goal: Will remain free from falls  Description  Will remain free from falls  1/4/2020 0957 by Tj Su RN  Outcome: Ongoing     Problem: Falls - Risk of:  Goal: Absence of physical injury  Description  Absence of physical injury  1/4/2020 0957 by Tj Su RN  Outcome: Ongoing     Problem: Discharge Planning:  Goal: Discharged to appropriate level of care  Description  Discharged to appropriate level of care  1/4/2020 0957 by Tj Su RN  Outcome: Ongoing     Problem: Skin Integrity:  Goal: Will show no infection signs and symptoms  Description  Will show no infection signs and symptoms  1/4/2020 0957 by Tj Su RN  Outcome: Ongoing     Problem: Skin Integrity:  Goal: Absence of new skin breakdown  Description  Absence of new skin breakdown  1/4/2020 0957 by Tj Su RN  Outcome: Ongoing

## 2020-01-04 NOTE — PROGRESS NOTES
Hospitalist Progress Note      PCP: Kae Han MD    Date of Admission: 1/2/2020      Subjective: up to chair, feels better, still coughing, no fever or chills overnight. Medications:  Reviewed    Infusion Medications   Scheduled Medications    cefTRIAXone (ROCEPHIN) IV  1 g Intravenous Q24H    doxycycline hyclate  100 mg Oral 2 times per day    fluticasone  2 spray Each Nostril Daily    montelukast  10 mg Oral Nightly    Netarsudil Dimesylate  1 drop Left Eye Nightly    bimatoprost  1 drop Both Eyes QPM    dorzolamide-timolol  1 drop Both Eyes BID    amLODIPine  5 mg Oral Daily    aspirin  81 mg Oral Daily    levothyroxine  75 mcg Oral Daily    lisinopril  20 mg Oral Daily    simvastatin  20 mg Oral Nightly    sodium chloride flush  10 mL Intravenous 2 times per day    enoxaparin  40 mg Subcutaneous Nightly     PRN Meds: calcium carbonate, sodium chloride flush, magnesium hydroxide, ondansetron, albuterol      Intake/Output Summary (Last 24 hours) at 1/4/2020 0959  Last data filed at 1/4/2020 0600  Gross per 24 hour   Intake 850 ml   Output 950 ml   Net -100 ml       Physical Exam Performed:    /74   Pulse 95   Temp 97.9 °F (36.6 °C) (Oral)   Resp 18   Ht 5' 5\" (1.651 m)   Wt 100 lb 9.6 oz (45.6 kg)   SpO2 98%   BMI 16.74 kg/m²     General appearance: No apparent distress  Neck: Suppl, with full range of motion. No jugular venous distention. Trachea midline. Respiratory:  Scattered rhonchi   Cardiovascular: Regular rate and rhythm with normal S1/S2 without murmurs, rubs or gallops. Abdomen: Soft, non-tender  Musculoskeletal: No clubbing  Skin: Skin color, texture, turgor normal.  No rashes or lesions.   Neurologic:  No focal weakness   Psychiatric: Alert and oriented  Capillary Refill: Brisk,< 3 seconds   Peripheral Pulses: +2 palpable, equal bilaterally       Labs:   Recent Labs     01/02/20  1150 01/03/20  1107   WBC 7.8 10.2   HGB 12.9 11.9*   HCT 39.0 36.1   PLT

## 2020-01-04 NOTE — PROGRESS NOTES
Occupational Therapy   Occupational Therapy Initial Assessment  Date: 2020   Patient Name: Gi Pozo  MRN: [id number]     : 1929    Date of Service: 2020    Discharge Recommendations:  Home with assist PRN, Patient would benefit from continued therapy after discharge, Home with Home health OT, S Level Todd Stephen scored a 21/24 on the [address] form. Current research shows that an AM-PAC score of 18 or greater is typically associated with a discharge to the patient's home setting. Based on the patients AM-PAC score and their current ADL deficits, it is recommended that the patient have 2-3 sessions per week of Occupational Therapy at d/c to increase the patients independence. Assessment   Performance deficits / Impairments: Decreased functional mobility ; Decreased ADL status; Decreased endurance;Decreased balance;Decreased high-level IADLs  Assessment: Pt is a 80 y.o. female admitted with PNA. At baseline, pt lives alone and modified independent ADLs, IADLs, and fxl mobility using cane vs walker. Family assists with IADLs prn. Pt currently functioning slightly below baseline d/t decreased endurance and fxl mobility. This date, pt SBA/CGA fxl transfers/mobility, SBA LB dressing, and anticipate pt would require overall SBA to min A for ADLs. Will cont to see on acute to address the above limitations and maximize pt's safety and independence. Anticipate pt will be safe to return home with assist prn and home OT to ensure safe transition home, although pt denies need for home care.    Prognosis: Good  Decision Making: Low Complexity  History: see above   Exam: decreased ADL status, endurance, balance/fxl mobility  Assistance / Modification: anticipate pt overall SBA to min A for ADLs  OT Education: OT Role;Plan of Care  REQUIRES OT FOLLOW UP: Yes  Activity Tolerance  Activity Tolerance: Patient Tolerated treatment well  Safety Devices  Safety Devices in place: Yes  Type of devices: Call light within reach; Chair alarm in place; Left in chair           Patient Diagnosis(es): The primary encounter diagnosis was Pneumonia of left lower lobe due to infectious organism [facility]). Diagnoses of Hypoxia, Tachycardia, Cough, and Shortness of breath were also pertinent to this visit. has a past medical history of Arthritis, Glaucoma, Hyperlipidemia, Hypertension, and TIA (transient ischemic attack). has a past surgical history that includes Thyroidectomy, partial; Varicose vein surgery; Breast surgery; and Hip Arthroplasty (Left, 08/26/2016). Restrictions  Restrictions/Precautions  Restrictions/Precautions: Fall Risk  Position Activity Restriction  Other position/activity restrictions: 3 L. O2 per nc    Subjective   General  Chart Reviewed: Yes  Additional Pertinent Hx: Per Nuha Ferrari DO's pulmonology note 1/3/20: \"This is a 80 y.o. female who presented to the ED on 1/2 with a CC of cough. Per ED notes she has had cough, fatigue and SOB. She was hypoxic at PCP's office. She was admitted for pneumonia\"   Family / Caregiver Present: No  Referring Practitioner: Luz Merida MD  Diagnosis: PNA  Subjective  Subjective: Pt met b/s for OT eval/tx. Pt in bed on arrival, agreeable to participate in therapy. Pt denies pain. Pt reports she isn't feeling much better.      Social/Functional History  Social/Functional History  Lives With: Alone  Type of Home: House  Home Layout: Two level, Able to Live on Main level with bedroom/bathroom, 1/2 bath on main level, Laundry in basement(flight of steps to 2nd floor for shower with L ascending handrail, flight of steps down to basement for laundry with trent handrail)  Home Access: Stairs to enter without rails  Entrance Stairs - Number of Steps: 1  Bathroom Shower/Tub: Walk-in shower, Doors, Shower chair with back  [employer]: Standard  Bathroom Equipment: Grab bars in shower, Shower chair, Hand-held shower  Bathroom

## 2020-01-04 NOTE — PLAN OF CARE
Problem: Falls - Risk of:  Goal: Will remain free from falls  Description  Will remain free from falls  1/4/2020 0011 by Susana Mancini RN  Outcome: Ongoing     Problem: Skin Integrity:  Goal: Will show no infection signs and symptoms  Description  Will show no infection signs and symptoms  1/4/2020 0011 by Susana Mancini RN  Outcome: Ongoing     Problem: Skin Integrity:  Goal: Will show no infection signs and symptoms  Description  Will show no infection signs and symptoms  1/4/2020 0011 by Susana Mancini RN  Outcome: Ongoing     Problem: Skin Integrity:  Goal: Absence of new skin breakdown  Description  Absence of new skin breakdown  1/4/2020 0011 by Susana Mancini RN  Outcome: Ongoing

## 2020-01-05 LAB
ANION GAP SERPL CALCULATED.3IONS-SCNC: 16 MMOL/L (ref 3–16)
BASOPHILS ABSOLUTE: 0 K/UL (ref 0–0.2)
BASOPHILS RELATIVE PERCENT: 0.4 %
BUN BLDV-MCNC: 16 MG/DL (ref 7–20)
CALCIUM SERPL-MCNC: 9 MG/DL (ref 8.3–10.6)
CHLORIDE BLD-SCNC: 102 MMOL/L (ref 99–110)
CO2: 21 MMOL/L (ref 21–32)
CREAT SERPL-MCNC: 0.6 MG/DL (ref 0.6–1.2)
EOSINOPHILS ABSOLUTE: 0.1 K/UL (ref 0–0.6)
EOSINOPHILS RELATIVE PERCENT: 1.1 %
GFR AFRICAN AMERICAN: >60
GFR NON-AFRICAN AMERICAN: >60
GLUCOSE BLD-MCNC: 101 MG/DL (ref 70–99)
HCT VFR BLD CALC: 37.7 % (ref 36–48)
HEMOGLOBIN: 12.4 G/DL (ref 12–16)
LYMPHOCYTES ABSOLUTE: 2.3 K/UL (ref 1–5.1)
LYMPHOCYTES RELATIVE PERCENT: 29.5 %
MCH RBC QN AUTO: 31.2 PG (ref 26–34)
MCHC RBC AUTO-ENTMCNC: 33 G/DL (ref 31–36)
MCV RBC AUTO: 94.5 FL (ref 80–100)
MONOCYTES ABSOLUTE: 0.7 K/UL (ref 0–1.3)
MONOCYTES RELATIVE PERCENT: 9.5 %
NEUTROPHILS ABSOLUTE: 4.6 K/UL (ref 1.7–7.7)
NEUTROPHILS RELATIVE PERCENT: 59.5 %
PDW BLD-RTO: 14.8 % (ref 12.4–15.4)
PLATELET # BLD: 286 K/UL (ref 135–450)
PMV BLD AUTO: 7.4 FL (ref 5–10.5)
POTASSIUM SERPL-SCNC: 3.6 MMOL/L (ref 3.5–5.1)
RBC # BLD: 3.99 M/UL (ref 4–5.2)
SODIUM BLD-SCNC: 139 MMOL/L (ref 136–145)
WBC # BLD: 7.7 K/UL (ref 4–11)

## 2020-01-05 PROCEDURE — 6360000002 HC RX W HCPCS: Performed by: INTERNAL MEDICINE

## 2020-01-05 PROCEDURE — 6370000000 HC RX 637 (ALT 250 FOR IP): Performed by: INTERNAL MEDICINE

## 2020-01-05 PROCEDURE — 1200000000 HC SEMI PRIVATE

## 2020-01-05 PROCEDURE — 85025 COMPLETE CBC W/AUTO DIFF WBC: CPT

## 2020-01-05 PROCEDURE — 2580000003 HC RX 258: Performed by: INTERNAL MEDICINE

## 2020-01-05 PROCEDURE — 99232 SBSQ HOSP IP/OBS MODERATE 35: CPT | Performed by: INTERNAL MEDICINE

## 2020-01-05 PROCEDURE — 36415 COLL VENOUS BLD VENIPUNCTURE: CPT

## 2020-01-05 PROCEDURE — 2700000000 HC OXYGEN THERAPY PER DAY

## 2020-01-05 PROCEDURE — 80048 BASIC METABOLIC PNL TOTAL CA: CPT

## 2020-01-05 PROCEDURE — 94640 AIRWAY INHALATION TREATMENT: CPT

## 2020-01-05 RX ADMIN — ENOXAPARIN SODIUM 40 MG: 40 INJECTION SUBCUTANEOUS at 20:35

## 2020-01-05 RX ADMIN — DORZOLAMIDE HYDROCHLORIDE AND TIMOLOL MALEATE 1 DROP: 20; 5 SOLUTION/ DROPS OPHTHALMIC at 20:37

## 2020-01-05 RX ADMIN — DOXYCYCLINE HYCLATE 100 MG: 100 TABLET, COATED ORAL at 08:29

## 2020-01-05 RX ADMIN — MONTELUKAST SODIUM 10 MG: 10 TABLET, FILM COATED ORAL at 20:35

## 2020-01-05 RX ADMIN — DOXYCYCLINE HYCLATE 100 MG: 100 TABLET, COATED ORAL at 20:35

## 2020-01-05 RX ADMIN — CEFTRIAXONE 1 G: 1 INJECTION, POWDER, FOR SOLUTION INTRAMUSCULAR; INTRAVENOUS at 11:17

## 2020-01-05 RX ADMIN — SODIUM CHLORIDE, PRESERVATIVE FREE 10 ML: 5 INJECTION INTRAVENOUS at 20:36

## 2020-01-05 RX ADMIN — ONDANSETRON 4 MG: 2 INJECTION INTRAMUSCULAR; INTRAVENOUS at 15:02

## 2020-01-05 RX ADMIN — FLUTICASONE PROPIONATE 2 SPRAY: 50 SPRAY, METERED NASAL at 08:31

## 2020-01-05 RX ADMIN — DORZOLAMIDE HYDROCHLORIDE AND TIMOLOL MALEATE 1 DROP: 20; 5 SOLUTION/ DROPS OPHTHALMIC at 08:29

## 2020-01-05 RX ADMIN — SODIUM CHLORIDE, PRESERVATIVE FREE 10 ML: 5 INJECTION INTRAVENOUS at 08:31

## 2020-01-05 RX ADMIN — LEVOTHYROXINE SODIUM 75 MCG: 75 TABLET ORAL at 05:13

## 2020-01-05 RX ADMIN — AMLODIPINE BESYLATE 5 MG: 5 TABLET ORAL at 08:29

## 2020-01-05 RX ADMIN — SIMVASTATIN 20 MG: 20 TABLET, FILM COATED ORAL at 20:35

## 2020-01-05 RX ADMIN — ASPIRIN 81 MG 81 MG: 81 TABLET ORAL at 08:30

## 2020-01-05 RX ADMIN — LISINOPRIL 20 MG: 10 TABLET ORAL at 08:29

## 2020-01-05 ASSESSMENT — PAIN SCALES - GENERAL: PAINLEVEL_OUTOF10: 0

## 2020-01-05 NOTE — PROGRESS NOTES
11.9*   HCT 39.0 36.1    238     Recent Labs     01/02/20  1150 01/03/20  1107   * 135*   K 3.4* 3.2*   CL 98* 98*   CO2 16* 23   BUN 15 20   CREATININE 0.6 0.7   CALCIUM 9.3 8.9     No results for input(s): AST, ALT, BILIDIR, BILITOT, ALKPHOS in the last 72 hours. No results for input(s): INR in the last 72 hours. 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 This is a long-standing   lesion, also present in 2006. It is slightly increased in size. Super   infection/mycetoma is a differential possibility. RECOMMENDATION:   If clinically warranted, follow-up CT chest is suggested. 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 Acute respiratory failure with hypoxia, currently on oxygen, possibly due to pneumonia, imaging not very impressive, iv antibiotics, consulted pulmonary, feeling better, but still needs oxygen with ambulation, likely home in am.   2. Possible pneumonia, iv antibiotics for now, oxygen, duoneb. 3. Essential hypertension, po medications. 4. High anion gap metabolic acidosis, pending BMP.        Diet: DIET GENERAL;  Code Status: Full Code        Reggie Rivera MD

## 2020-01-05 NOTE — PROGRESS NOTES
Pulmonary Progress Note    Date of Admission: 1/2/2020   LOS: 3 days     Chief Complaint   Patient presents with    Cough     for last week; took xray at PCP and sent here       Assessment:     Acute hypoxia  Possible community-acquired pneumonia  Pneumatocele    Plan:      -Wean supplemental oxygen goal SaO2 greater than than 90%  -Recommend 7 days empiric treatment for presumed community-acquired pneumonia  -Viral culture pending  -Duo nebs    24 Hour Events/Subjective  No acute events overnight, patient remains on supplemental oxygen weaned to 1 L/min overnight. ROS:   No nausea  No Vomiting  No chest pain      Intake/Output Summary (Last 24 hours) at 1/5/2020 1544  Last data filed at 1/5/2020 1444  Gross per 24 hour   Intake 320 ml   Output --   Net 320 ml         PHYSICAL EXAM:   Blood pressure 130/76, pulse 99, temperature 98.3 °F (36.8 °C), temperature source Oral, resp. rate 16, height 5' 5\" (1.651 m), weight 100 lb 15.5 oz (45.8 kg), SpO2 96 %.'  Gen:  No acute distress. Eyes: PERRL. Anicteric sclera. No conjunctival injection. ENT: No discharge. Posterior oropharynx clear. External appearance of ears and nose normal.  Neck: Trachea midline. No mass   Resp:  No crackles. No wheezes. No rhonchi. No dullness on percussion. CV: Regular rate. Regular rhythm. No murmur or rub. No edema. GI: Soft, Non-tender. Non-distended. +BS  Skin: Warm, dry, w/o erythema. Lymph: No cervical or supraclavicular LAD. M/S: No cyanosis. No clubbing. Neuro:  no focal neurologic deficit. Moves all extremities  Psych: Awake and alert, Oriented x 3. Judgement and insight appropriate. Mood stable.       Medications:    Scheduled Meds:   cefTRIAXone (ROCEPHIN) IV  1 g Intravenous Q24H    doxycycline hyclate  100 mg Oral 2 times per day    fluticasone  2 spray Each Nostril Daily    montelukast  10 mg Oral Nightly    Netarsudil Dimesylate  1 drop Left Eye Nightly    bimatoprost  1 drop Both Eyes QPM   

## 2020-01-05 NOTE — PLAN OF CARE
Problem: Falls - Risk of:  Goal: Will remain free from falls  Description  Will remain free from falls  1/5/2020 1514 by Og You RN  Outcome: Ongoing     Problem: Falls - Risk of:  Goal: Absence of physical injury  Description  Absence of physical injury  1/5/2020 1514 by Og You RN  Outcome: Ongoing     Problem: Discharge Planning:  Goal: Discharged to appropriate level of care  Description  Discharged to appropriate level of care  1/5/2020 1514 by Og You RN  Outcome: Ongoing     Problem: Skin Integrity:  Goal: Will show no infection signs and symptoms  Description  Will show no infection signs and symptoms  1/5/2020 1514 by Og You RN  Outcome: Ongoing     Problem: Skin Integrity:  Goal: Absence of new skin breakdown  Description  Absence of new skin breakdown  1/5/2020 1514 by Og You RN  Outcome: Ongoing

## 2020-01-05 NOTE — PLAN OF CARE
Problem: Falls - Risk of:  Goal: Will remain free from falls  Description  Will remain free from falls  Outcome: Ongoing     Problem: Discharge Planning:  Goal: Discharged to appropriate level of care  Description  Discharged to appropriate level of care  Outcome: Ongoing     Problem: Skin Integrity:  Goal: Will show no infection signs and symptoms  Description  Will show no infection signs and symptoms  Outcome: Ongoing

## 2020-01-06 VITALS
RESPIRATION RATE: 12 BRPM | OXYGEN SATURATION: 92 % | TEMPERATURE: 98.1 F | SYSTOLIC BLOOD PRESSURE: 146 MMHG | HEIGHT: 65 IN | BODY MASS INDEX: 17.3 KG/M2 | WEIGHT: 103.84 LBS | DIASTOLIC BLOOD PRESSURE: 79 MMHG | HEART RATE: 92 BPM

## 2020-01-06 PROCEDURE — 6370000000 HC RX 637 (ALT 250 FOR IP): Performed by: INTERNAL MEDICINE

## 2020-01-06 PROCEDURE — 6360000002 HC RX W HCPCS: Performed by: INTERNAL MEDICINE

## 2020-01-06 PROCEDURE — 94761 N-INVAS EAR/PLS OXIMETRY MLT: CPT

## 2020-01-06 PROCEDURE — 99232 SBSQ HOSP IP/OBS MODERATE 35: CPT | Performed by: INTERNAL MEDICINE

## 2020-01-06 PROCEDURE — 2580000003 HC RX 258: Performed by: INTERNAL MEDICINE

## 2020-01-06 PROCEDURE — 2700000000 HC OXYGEN THERAPY PER DAY

## 2020-01-06 RX ORDER — MONTELUKAST SODIUM 10 MG/1
10 TABLET ORAL NIGHTLY
Qty: 30 TABLET | Refills: 0 | Status: SHIPPED | OUTPATIENT
Start: 2020-01-06 | End: 2020-07-09

## 2020-01-06 RX ORDER — CEFUROXIME AXETIL 500 MG/1
500 TABLET ORAL 2 TIMES DAILY
Qty: 10 TABLET | Refills: 0 | Status: SHIPPED | OUTPATIENT
Start: 2020-01-06 | End: 2020-01-11

## 2020-01-06 RX ORDER — DOXYCYCLINE HYCLATE 100 MG
100 TABLET ORAL EVERY 12 HOURS SCHEDULED
Qty: 10 TABLET | Refills: 0 | Status: SHIPPED | OUTPATIENT
Start: 2020-01-06 | End: 2020-01-11

## 2020-01-06 RX ORDER — FLUTICASONE PROPIONATE 50 MCG
2 SPRAY, SUSPENSION (ML) NASAL DAILY
Qty: 1 BOTTLE | Refills: 0 | Status: SHIPPED | OUTPATIENT
Start: 2020-01-07 | End: 2020-07-09

## 2020-01-06 RX ORDER — ALBUTEROL SULFATE 90 UG/1
2 AEROSOL, METERED RESPIRATORY (INHALATION) 4 TIMES DAILY PRN
Qty: 3 INHALER | Refills: 0 | Status: SHIPPED | OUTPATIENT
Start: 2020-01-06 | End: 2020-02-20

## 2020-01-06 RX ADMIN — LEVOTHYROXINE SODIUM 75 MCG: 75 TABLET ORAL at 05:04

## 2020-01-06 RX ADMIN — ASPIRIN 81 MG 81 MG: 81 TABLET ORAL at 09:49

## 2020-01-06 RX ADMIN — DORZOLAMIDE HYDROCHLORIDE AND TIMOLOL MALEATE 1 DROP: 20; 5 SOLUTION/ DROPS OPHTHALMIC at 09:50

## 2020-01-06 RX ADMIN — AMLODIPINE BESYLATE 5 MG: 5 TABLET ORAL at 09:49

## 2020-01-06 RX ADMIN — SODIUM CHLORIDE, PRESERVATIVE FREE 10 ML: 5 INJECTION INTRAVENOUS at 10:36

## 2020-01-06 RX ADMIN — LISINOPRIL 20 MG: 10 TABLET ORAL at 09:49

## 2020-01-06 RX ADMIN — DOXYCYCLINE HYCLATE 100 MG: 100 TABLET, COATED ORAL at 09:49

## 2020-01-06 RX ADMIN — FLUTICASONE PROPIONATE 2 SPRAY: 50 SPRAY, METERED NASAL at 09:50

## 2020-01-06 RX ADMIN — CEFTRIAXONE 1 G: 1 INJECTION, POWDER, FOR SOLUTION INTRAMUSCULAR; INTRAVENOUS at 10:53

## 2020-01-06 ASSESSMENT — PAIN SCALES - GENERAL
PAINLEVEL_OUTOF10: 0
PAINLEVEL_OUTOF10: 0

## 2020-01-06 NOTE — PROGRESS NOTES
Pulmonary Progress Note    Date of Admission: 1/2/2020   LOS: 4 days     Chief Complaint   Patient presents with    Cough     for last week; took xray at PCP and sent here       Assessment:     Acute hypoxia  Possible community-acquired pneumonia  Pneumatocele    Plan:      -Wean supplemental oxygen goal SaO2 greater than than 90%  -Recommend 7 days empiric treatment for presumed community-acquired pneumonia  -Viral culture pending  -Duo nebs  -home o2 screen prior to discharge    24 Hour Events/Subjective  Remains on 1LPM but spo2 96%    ROS:   No nausea  No Vomiting  No chest pain      Intake/Output Summary (Last 24 hours) at 1/6/2020 0804  Last data filed at 1/5/2020 1444  Gross per 24 hour   Intake 320 ml   Output --   Net 320 ml         PHYSICAL EXAM:   Blood pressure 130/70, pulse 93, temperature 98.3 °F (36.8 °C), resp. rate 16, height 5' 5\" (1.651 m), weight 103 lb 13.4 oz (47.1 kg), SpO2 94 %.'  Gen:  No acute distress. Eyes: PERRL. Anicteric sclera. No conjunctival injection. ENT: No discharge. Posterior oropharynx clear. External appearance of ears and nose normal.  Neck: Trachea midline. No mass   Resp:  No crackles. No wheezes. No rhonchi. No dullness on percussion. CV: Regular rate. Regular rhythm. No murmur or rub. No edema. GI: Soft, Non-tender. Non-distended. +BS  Skin: Warm, dry, w/o erythema. Lymph: No cervical or supraclavicular LAD. M/S: No cyanosis. No clubbing. Neuro:  no focal neurologic deficit. Moves all extremities  Psych: Awake and alert, Oriented x 3. Judgement and insight appropriate. Mood stable.       Medications:    Scheduled Meds:   cefTRIAXone (ROCEPHIN) IV  1 g Intravenous Q24H    doxycycline hyclate  100 mg Oral 2 times per day    fluticasone  2 spray Each Nostril Daily    montelukast  10 mg Oral Nightly    Netarsudil Dimesylate  1 drop Left Eye Nightly    bimatoprost  1 drop Both Eyes QPM    dorzolamide-timolol  1 drop Both Eyes BID    amLODIPine  5 mg Oral Daily    aspirin  81 mg Oral Daily    levothyroxine  75 mcg Oral Daily    lisinopril  20 mg Oral Daily    simvastatin  20 mg Oral Nightly    sodium chloride flush  10 mL Intravenous 2 times per day    enoxaparin  40 mg Subcutaneous Nightly       Continuous Infusions:      PRN Meds:  calcium carbonate, sodium chloride flush, magnesium hydroxide, ondansetron, albuterol    Labs reviewed:  CBC:   Recent Labs     01/03/20  1107 01/05/20  0847   WBC 10.2 7.7   HGB 11.9* 12.4   HCT 36.1 37.7   MCV 93.8 94.5    286     BMP:   Recent Labs     01/03/20  1107 01/05/20  0847   * 139   K 3.2* 3.6   CL 98* 102   CO2 23 21   BUN 20 16   CREATININE 0.7 0.6     LIVER PROFILE: No results for input(s): AST, ALT, LIPASE, BILIDIR, BILITOT, ALKPHOS in the last 72 hours. Invalid input(s): AMYLASE,  ALB  PT/INR: No results for input(s): PROTIME, INR in the last 72 hours. APTT: No results for input(s): APTT in the last 72 hours. UA:No results for input(s): NITRITE, COLORU, PHUR, LABCAST, WBCUA, RBCUA, MUCUS, TRICHOMONAS, YEAST, BACTERIA, CLARITYU, SPECGRAV, LEUKOCYTESUR, UROBILINOGEN, BILIRUBINUR, BLOODU, GLUCOSEU, AMORPHOUS in the last 72 hours. Invalid input(s): Barb Rossi  No results for input(s): PH, PCO2, PO2 in the last 72 hours. Films:  Radiology Review:  Pertinent images / reports were reviewed as a part of this visit. CT Chest w/ contrast: No results found for this or any previous visit. CT Chest w/o contrast: No results found for this or any previous visit. CTPA: No results found for this or any previous visit. CXR PA/LAT:   Results for orders placed during the hospital encounter of 01/02/20   XR CHEST STANDARD (2 VW)    Narrative EXAMINATION:  TWO XRAY VIEWS OF THE CHEST    1/2/2020 11:51 am    COMPARISON:  08/26/2016.   CT chest, 08/16/2006    HISTORY:  ORDERING SYSTEM PROVIDED HISTORY: Shortness of breath  TECHNOLOGIST PROVIDED HISTORY:  Reason for exam:->Shortness of Consent 1/Introductory Paragraph: The rationale for Mohs was explained to the patient and consent was obtained. The risks, benefits and alternatives to therapy were discussed in detail. Specifically, the risks of infection, scarring, bleeding, prolonged wound healing, incomplete removal, allergy to anesthesia, nerve injury and recurrence were addressed. Prior to the procedure, the treatment site was clearly identified and confirmed by the patient. All components of Universal Protocol/PAUSE Rule completed.

## 2020-01-06 NOTE — DISCHARGE INSTR - COC
Continuity of Care Form    Patient Name: Maria Eugenia Dc   :  1929  MRN:  [id number]    Admit date:  2020  Discharge date:  ***    Code Status Order: Full Code   Advance Directives:   [facility] Documentation     Date/Time Healthcare Directive Type of Healthcare Directive Copy in [address] Agent's Name Healthcare Agent's Phone Number    [phone]  Yes, patient has an advance directive for healthcare treatment  Durable power of  for health care  No, copy requested from family  [facility] power of   juvenal Hernandez [place] push   [phone]          Admitting Physician:  Rocio Sumner MD  PCP: Pauline Marte MD    Discharging Nurse: [facility] Unit/Room#: U3X-5726/5270-01  Discharging Unit Phone Number: ***    Emergency Contact:   Extended Emergency Contact Information  Primary Emergency Contact: [address] Phone: [phone]  Relation: Child  Secondary Emergency Contact: [address] [place] [address] Phone: [phone]  Relation: Child    Past Surgical History:  Past Surgical History:   Procedure Laterality Date    BREAST SURGERY      left lumpectomy    HIP ARTHROPLASTY Left 2016    hemiarthroplasty left hip    THYROIDECTOMY, PARTIAL      VARICOSE VEIN SURGERY         Immunization History:   Immunization History   Administered Date(s) Administered    Influenza, High Dose (Fluzone 65 yrs and older) 10/16/2019    Tdap (Boostrix, Adacel) 2016       Active Problems:  Patient Active Problem List   Diagnosis Code    Hyponatremia E87.1    Nausea & vomiting R11.2    Left displaced femoral neck fracture ([address] S72.002A    Community acquired pneumonia J18.9    Acute respiratory failure with hypoxia ([address] J96.01    High anion gap metabolic acidosis S27.3    Hypoxia R09.02    Rhinitis J31.0    Pneumatocele of lung J98.4       Isolation/Infection:   Isolation          No Isolation Date of Last BM: ***    Intake/Output Summary (Last 24 hours) at 2020 1239  Last data filed at 2020 1444  Gross per 24 hour   Intake 100 ml   Output --   Net 100 ml     I/O last 3 completed shifts:   In: 320 [P.O.:320]  Out: -     Safety Concerns:     [employer] Safety Concerns:816439404}    Impairments/Disabilities:      [employer] Impairments/Disabilities:957151383}    Nutrition Therapy:  Current Nutrition Therapy:   [employer] Diet List:550418336}    Routes of Feeding: {CHP DME Other Feedings:129606618}  Liquids: {Slp liquid thickness:93761}  Daily Fluid Restriction: {CHP DME Yes amt example:859397511}  Last Modified Barium Swallow with Video (Video Swallowing Test): {Done Not Done JBBU:092833574}    Treatments at the Time of Hospital Discharge:   Respiratory Treatments: ***  Oxygen Therapy:  {Therapy; copd oxygen:23443}  Ventilator:    { CC Vent GZMJ:729261761}    Rehab Therapies: {THERAPEUTIC INTERVENTION:4598362507}  Weight Bearing Status/Restrictions: [address]  Weight Bearin}  Other Medical Equipment (for information only, NOT a DME order):  {EQUIPMENT:848305517}  Other Treatments: ***    Patient's personal belongings (please select all that are sent with patient):  {P DME Belongings:441711344}    RN SIGNATURE:  {Esignature:559005325}    CASE MANAGEMENT/SOCIAL WORK SECTION    Inpatient Status Date: ***    Readmission Risk Assessment Score:  Readmission Risk              Risk of Unplanned Readmission:        12           Discharging to Facility/ Agency   · Name:   · Address:  · Phone:  · Fax:    Dialysis Facility (if applicable)   · Name:  · Address:  · Dialysis Schedule:  · Phone:  · Fax:    / signature: {Esignature:102053067}    PHYSICIAN SECTION    Prognosis: Good    Condition at Discharge: Stable    Rehab Potential (if transferring to Rehab): Good    Recommended Labs or Other Treatments After Discharge: PT, OT, nursing services, follow up PCP in 1 week, follow up

## 2020-01-06 NOTE — PROGRESS NOTES
Risk  Position Activity Restriction  Other position/activity restrictions: Pt on room air-02 sats WNL  Subjective   General  Chart Reviewed: Yes  Patient assessed for rehabilitation services?: Yes  Additional Pertinent Hx: Per Yovani Rowley DO's pulmonology note 1/3/20: \"This is a 80 y.o. female who presented to the ED on 1/2 with a CC of cough. Per ED notes she has had cough, fatigue and SOB. She was hypoxic at PCP's office. She was admitted for pneumonia\"   Family / Caregiver Present: Yes(son in law)  Referring Practitioner: Edwar Easley MD  Diagnosis: PNA  Subjective  Subjective: Pt met BS, in bed. Pt agreeable to OT (son in law stopped therapist in hallway, with questions regarding d/c plans). Pt reporting to this therapist she is going to her daughter in [address], after staying at her home tonight, with her son present (son in law did not seem aware of plan for pt to go to daughter's home tomorrow). Pt denied pain. Orientation  Orientation  Overall Orientation Status: Within Functional Limits  Objective    ADL  UE Dressing: Modified independent   LE Dressing: Modified independent   Additional Comments: gathers clothes from closlet, with cane, and carries items to bed. Dresses seated from EOB-bra, shirt, pants, socks, shoes (slip on boots). Standing Balance  Time: 2-3 min  Activity: functional mob with str cane, ADL's. Functional Mobility  Functional - Mobility Device: Cane  Activity: Transport items; Retrieve items  Assist Level: Modified independent   Functional Mobility Comments: gathered clothes from closet and carried to bed to dress self. Wheelchair Bed Transfers  Wheelchair/Bed - Technique: Ambulating  Equipment Used: Bed(recliner)  Level of Asssistance: Supervision; Independent  Wheelchair Transfers Comments: using SPC.  Did require cues not to 'plop' onto surfaces  Bed mobility  Supine to Sit: Modified independent  Sit to Supine: Unable to assess(up to chair) Cognition  Overall Cognitive Status: [facility]         Plan   Plan  Times per week: 3-5  Times per day: Daily  Current Treatment Recommendations: Strengthening, Balance Training, Functional Mobility Training, Endurance Training, Self-Care / ADL, Safety Education & Training    AM-PAC Score        AM-PAC Inpatient Daily Activity Raw Score: 22 (01/06/20 1531)  AM-PAC Inpatient ADL T-Scale Score : 47.1 (01/06/20 1531)  ADL Inpatient CMS 0-100% Score: 25.8 (01/06/20 1531)  ADL Inpatient CMS G-Code Modifier : Roman Shorts (01/06/20 1531)    Goals  Short term goals  Time Frame for Short term goals: Status: goals not met, except where stated below  Short term goal 1: Pt will bathe with mod I. Short term goal 2: Pt will dress with mod I.-goal met  Short term goal 3: Pt will toilet with mod I. Short term goal 4: Pt will complete fxl mobility and fxl transfers to/from ADL surfaces with mod I. Short term goal 5: Pt will complete item retrieval/transport in room with mod I in prep for ADL/light homemaking task. -goal met  Long term goals  Time Frame for Long term goals : STG=LTG  Patient Goals   Patient goals : to return home.         Therapy Time   Individual Concurrent Group Co-treatment   Time In 1501         Time Out 1529         Minutes 28              Sekou DOYLE/L,515  This note to serve as discharge summary if pt d/c'd prior to next session

## 2020-01-06 NOTE — DISCHARGE SUMMARY
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 Hospital Course:     From HPI:\"The patient is a 80 y.o. female who presents to [facility] with acute onset and progressive course of sob. Sob started one week ago sob worse with exertion no relieving factors sob associated with cough no fever chills cp \"    1. Acute respiratory failure with hypoxia, currently on oxygen, possibly due to pneumonia, imaging not very impressive, iv antibiotics as inpatient, changing to po antibiotics for discharge, patient advised to seek immediate medical help in case of worsening, family at bedside. Discussed with pulmo the day of discharge. 2. Possible pneumonia, iv antibiotics changing to po, oxygen. 3. Essential hypertension, po medications. 4. High anion gap metabolic acidosis, improved. Physical Exam Performed:     BP (!) 146/79   Pulse 92   Temp 98.1 °F (36.7 °C)   Resp 16   Ht 5' 5\" (1.651 m)   Wt 103 lb 13.4 oz (47.1 kg)   SpO2 96%   BMI 17.28 kg/m²       General appearance:  No apparent distress  HEENT:  Normal cephalic  Neck: Supple  Respiratory:  Scattered rhonchi   Cardiovascular:  Regular rate and rhythm with normal S1/S2 without murmurs, rubs or gallops.   Abdomen: Soft, non-tender  Musculoskeletal:  No clubbing  Skin: Skin color, texture, turgor normal.  No rashes or lesions. Neurologic:  No focal weakness   Psychiatric:  Alert and oriented  Capillary Refill: Brisk,< 3 seconds   Peripheral Pulses: +2 palpable, equal bilaterally       Labs: For convenience and continuity at follow-up the following most recent labs are provided:      CBC:    Lab Results   Component Value Date    WBC 7.7 01/05/2020    HGB 12.4 01/05/2020    HCT 37.7 01/05/2020     01/05/2020       Renal:    Lab Results   Component Value Date     01/05/2020    K 3.6 01/05/2020    K 3.4 01/02/2020     01/05/2020    CO2 21 01/05/2020    BUN 16 01/05/2020    CREATININE 0.6 01/05/2020    CALCIUM 9.0 01/05/2020    PHOS 3.0 07/15/2015         Significant Diagnostic Studies    Radiology:   XR CHEST STANDARD (2 VW)   Final Result   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 This is a long-standing   lesion, also present in 2006. It is slightly increased in size. Super   infection/mycetoma is a differential possibility. RECOMMENDATION:   If clinically warranted, follow-up CT chest is suggested.                 Consults:     IP CONSULT TO HOSPITALIST  IP CONSULT TO PULMONOLOGY  IP CONSULT TO HOME CARE NEEDS    Disposition:  home     Condition at Discharge: Stable    Discharge Instructions/Follow-up:  PCP, Pulmonary     Code Status:  Full Code     Activity: activity as tolerated    Diet: regular diet      Discharge Medications:     Current Discharge Medication List           Details   montelukast (SINGULAIR) 10 MG tablet Take 1 tablet by mouth nightly  Qty: 30 tablet, Refills: 0      fluticasone (FLONASE) 50 MCG/ACT nasal spray 2 sprays by Each Nostril route daily  Qty: 1 Bottle, Refills: 0      doxycycline hyclate (VIBRA-TABS) 100 MG tablet Take 1 tablet by mouth every 12 hours for 5 days  Qty: 10 tablet, Refills: 0      cefUROXime (CEFTIN) 500 MG tablet Take 1 tablet by mouth 2 times daily for 5 days  Qty: 10 tablet, Refills: 0      albuterol sulfate  (90 Base) MCG/ACT inhaler Inhale 2 puffs into the lungs 4 times daily as needed for Wheezing  Qty: 3 Inhaler, Refills: 0              Details   LUMIGAN 0.01 % SOLN ophthalmic drops Place 1 drop into both eyes every evening      Timolol (TIMOPTIC) 0.5 % (DAILY) SOLN ophthalmic solution Place 1 drop into both eyes 2 times daily      RHOPRESSA 0.02 % SOLN Place 1 drop into both eyes 2 times daily      benazepril (LOTENSIN) 20 MG tablet Take 20 mg by mouth daily      Cranberry 400 MG CAPS Take 1 capsule by mouth daily      Probiotic Product (PROBIOTIC-10) CAPS Take 1 capsule by mouth daily      amLODIPine (NORVASC) 5 MG tablet Take 1 tablet by mouth daily  Qty: 30 tablet, Refills: 3      levothyroxine (SYNTHROID) 75 MCG tablet Take 75 mcg by mouth Daily      aspirin 81 MG tablet Take 81 mg by mouth daily. lovastatin (MEVACOR) 40 MG tablet Take 40 mg by mouth nightly. omeprazole (PRILOSEC) 20 MG capsule Take 20 mg by mouth daily. Time Spent on discharge is more than 1 hour in the examination, evaluation, counseling and review of medications and discharge plan. Signed:    Verónica Bella MD   1/6/2020      Thank you Kathy Helton MD for the opportunity to be involved in this patient's care. If you have any questions or concerns please feel free to contact me at [phone].

## 2020-01-07 ENCOUNTER — CARE COORDINATION (OUTPATIENT)
Dept: CASE MANAGEMENT | Age: 85
End: 2020-01-07

## 2020-01-07 ENCOUNTER — TELEPHONE (OUTPATIENT)
Dept: PULMONOLOGY | Age: 85
End: 2020-01-07

## 2020-01-07 NOTE — TELEPHONE ENCOUNTER
The patient's daughter called. She was in [address] from 1.2.2020 and discharged on 1/6/2020 with pneumonia. When to follow-up? Please advise.

## 2020-01-08 ENCOUNTER — CARE COORDINATION (OUTPATIENT)
Dept: CASE MANAGEMENT | Age: 85
End: 2020-01-08

## 2020-01-13 LAB
FINAL REPORT: NORMAL
PRELIMINARY: NORMAL

## 2020-01-15 ENCOUNTER — CARE COORDINATION (OUTPATIENT)
Dept: CASE MANAGEMENT | Age: 85
End: 2020-01-15

## 2020-01-15 NOTE — CARE COORDINATION
Non qualifying final DRG of 193 for admission to [address] [place] [address]  1/2/20-1/6/20.  CTN sign off for BPCI-A program.    Alexey Durham, RN BSN   Care Transitions Nurse  [phone]

## 2020-02-11 ENCOUNTER — HOSPITAL ENCOUNTER (OUTPATIENT)
Dept: GENERAL RADIOLOGY | Age: 85
Discharge: HOME | End: 2020-02-11
Payer: MEDICARE

## 2020-02-11 ENCOUNTER — HOSPITAL ENCOUNTER (OUTPATIENT)
Age: 85
Discharge: HOME | End: 2020-02-11
Payer: MEDICARE

## 2020-02-11 PROCEDURE — 71046 X-RAY EXAM CHEST 2 VIEWS: CPT

## 2020-02-20 ENCOUNTER — OFFICE VISIT (OUTPATIENT)
Dept: PULMONOLOGY | Age: 85
End: 2020-02-20
Payer: MEDICARE

## 2020-02-20 VITALS
WEIGHT: 102 LBS | HEIGHT: 65 IN | SYSTOLIC BLOOD PRESSURE: 121 MMHG | DIASTOLIC BLOOD PRESSURE: 79 MMHG | OXYGEN SATURATION: 98 % | TEMPERATURE: 97.7 F | BODY MASS INDEX: 17 KG/M2 | RESPIRATION RATE: 16 BRPM | HEART RATE: 107 BPM

## 2020-02-20 PROCEDURE — 1123F ACP DISCUSS/DSCN MKR DOCD: CPT | Performed by: INTERNAL MEDICINE

## 2020-02-20 PROCEDURE — 4040F PNEUMOC VAC/ADMIN/RCVD: CPT | Performed by: INTERNAL MEDICINE

## 2020-02-20 PROCEDURE — 3023F SPIROM DOC REV: CPT | Performed by: INTERNAL MEDICINE

## 2020-02-20 PROCEDURE — G8427 DOCREV CUR MEDS BY ELIG CLIN: HCPCS | Performed by: INTERNAL MEDICINE

## 2020-02-20 PROCEDURE — 99214 OFFICE O/P EST MOD 30 MIN: CPT | Performed by: INTERNAL MEDICINE

## 2020-02-20 PROCEDURE — 1090F PRES/ABSN URINE INCON ASSESS: CPT | Performed by: INTERNAL MEDICINE

## 2020-02-20 PROCEDURE — 1036F TOBACCO NON-USER: CPT | Performed by: INTERNAL MEDICINE

## 2020-02-20 PROCEDURE — G8926 SPIRO NO PERF OR DOC: HCPCS | Performed by: INTERNAL MEDICINE

## 2020-02-20 PROCEDURE — G8482 FLU IMMUNIZE ORDER/ADMIN: HCPCS | Performed by: INTERNAL MEDICINE

## 2020-02-20 PROCEDURE — G8419 CALC BMI OUT NRM PARAM NOF/U: HCPCS | Performed by: INTERNAL MEDICINE

## 2020-02-20 RX ORDER — BRIMONIDINE TARTRATE 2 MG/ML
1 SOLUTION/ DROPS OPHTHALMIC 3 TIMES DAILY
Status: ON HOLD | COMMUNITY
End: 2021-11-13

## 2020-02-20 RX ORDER — AMOXICILLIN AND CLAVULANATE POTASSIUM 875; 125 MG/1; MG/1
1 TABLET, FILM COATED ORAL 2 TIMES DAILY
Qty: 20 TABLET | Refills: 0 | Status: SHIPPED | OUTPATIENT
Start: 2020-02-20 | End: 2020-03-01

## 2020-02-20 NOTE — PROGRESS NOTES
palpitations  LUNGS:  Improved cough and SOB  ABDOMEN:  No abdominal pains, no changes in stools  GENITOURINARY:  No increased frequency, no blood in urine  EXTREMITIES:  No swelling, no lesions  NEURO:  No numbness or tingling, no seizures  SKIN:  No new rashes, no changes in hair or nails  LYMPH:  No masses, no swelling neck, armpits, or groin    PHYSICAL EXAM:  Vitals:    02/20/20 0954   BP: 121/79   Pulse: 107   Resp: 16   Temp: 97.7 °F (36.5 °C)   SpO2: 98%       GENERAL:  Thin, appears listed age  HEENT:  No scleral icterus, no conjunctival irritation  NECK:  No thyromegaly, no bruits  LYMPH:  No cervical or supraclavicular adenopathy  HEART:  Regular rate and rhythm, no murmurs  LUNGS:  Clear but diminished   ABDOMEN:  No distention, no organomegaly  EXTREMITIES:  No edema, no digital clubbing  NEURO:  No localizing deficits, CN II-XII intact    Pulmonary Function Testing  None    Chest imaging from 2/2020 is reviewed. My interpretation is mildly infection in pneumatocele      ECHO  None    Lab Results   Component Value Date    WBC 7.7 01/05/2020    HGB 12.4 01/05/2020    HCT 37.7 01/05/2020    MCV 94.5 01/05/2020     01/05/2020       No results found for: BNP    Lab Results   Component Value Date    CREATININE 0.6 01/05/2020    BUN 16 01/05/2020     01/05/2020    K 3.6 01/05/2020     01/05/2020    CO2 21 01/05/2020       I reviewed above labs and studies pertinent to this visit and date    Assessment/Plan:  1. Pneumatocele of lung  There is a slightly large fluid level in the pneumatocele when compared the initial CXR. Could be concerning for residual infection. Will give augmentin for 10 days. CXR in 2 months to evaluate. Could also be new normal  - amoxicillin-clavulanate (AUGMENTIN) 875-125 MG per tablet; Take 1 tablet by mouth 2 times daily for 10 days  Dispense: 20 tablet; Refill: 0  - XR CHEST STANDARD (2 VW);  Future

## 2020-06-03 ENCOUNTER — TELEPHONE (OUTPATIENT)
Dept: PULMONOLOGY | Age: 85
End: 2020-06-03

## 2020-07-09 ENCOUNTER — APPOINTMENT (OUTPATIENT)
Dept: GENERAL RADIOLOGY | Age: 85
End: 2020-07-09
Payer: MEDICARE

## 2020-07-09 ENCOUNTER — HOSPITAL ENCOUNTER (EMERGENCY)
Age: 85
Discharge: HOME | End: 2020-07-09
Payer: MEDICARE

## 2020-07-09 VITALS
TEMPERATURE: 97.8 F | RESPIRATION RATE: 16 BRPM | OXYGEN SATURATION: 96 % | DIASTOLIC BLOOD PRESSURE: 79 MMHG | HEIGHT: 65 IN | SYSTOLIC BLOOD PRESSURE: 133 MMHG | BODY MASS INDEX: 16.97 KG/M2 | HEART RATE: 98 BPM

## 2020-07-09 LAB
ANION GAP SERPL CALCULATED.3IONS-SCNC: 11 MMOL/L (ref 3–16)
BASOPHILS ABSOLUTE: 0 K/UL (ref 0–0.2)
BASOPHILS RELATIVE PERCENT: 0.8 %
BUN BLDV-MCNC: 15 MG/DL (ref 7–20)
CALCIUM SERPL-MCNC: 9 MG/DL (ref 8.3–10.6)
CHLORIDE BLD-SCNC: 106 MMOL/L (ref 99–110)
CO2: 23 MMOL/L (ref 21–32)
CREAT SERPL-MCNC: 0.6 MG/DL (ref 0.6–1.2)
EOSINOPHILS ABSOLUTE: 0.1 K/UL (ref 0–0.6)
EOSINOPHILS RELATIVE PERCENT: 2.6 %
GFR AFRICAN AMERICAN: >60
GFR NON-AFRICAN AMERICAN: >60
GLUCOSE BLD-MCNC: 106 MG/DL (ref 70–99)
HCT VFR BLD CALC: 37.9 % (ref 36–48)
HEMOGLOBIN: 12.3 G/DL (ref 12–16)
LYMPHOCYTES ABSOLUTE: 2 K/UL (ref 1–5.1)
LYMPHOCYTES RELATIVE PERCENT: 37.1 %
MCH RBC QN AUTO: 29.4 PG (ref 26–34)
MCHC RBC AUTO-ENTMCNC: 32.5 G/DL (ref 31–36)
MCV RBC AUTO: 90.4 FL (ref 80–100)
MONOCYTES ABSOLUTE: 0.6 K/UL (ref 0–1.3)
MONOCYTES RELATIVE PERCENT: 10.8 %
NEUTROPHILS ABSOLUTE: 2.7 K/UL (ref 1.7–7.7)
NEUTROPHILS RELATIVE PERCENT: 48.7 %
PDW BLD-RTO: 15.9 % (ref 12.4–15.4)
PLATELET # BLD: 234 K/UL (ref 135–450)
PMV BLD AUTO: 7 FL (ref 5–10.5)
POTASSIUM REFLEX MAGNESIUM: 4.3 MMOL/L (ref 3.5–5.1)
RBC # BLD: 4.2 M/UL (ref 4–5.2)
SODIUM BLD-SCNC: 140 MMOL/L (ref 136–145)
TROPONIN: <0.01 NG/ML
WBC # BLD: 5.4 K/UL (ref 4–11)

## 2020-07-09 PROCEDURE — 93005 ELECTROCARDIOGRAM TRACING: CPT | Performed by: STUDENT IN AN ORGANIZED HEALTH CARE EDUCATION/TRAINING PROGRAM

## 2020-07-09 PROCEDURE — 80048 BASIC METABOLIC PNL TOTAL CA: CPT

## 2020-07-09 PROCEDURE — 84484 ASSAY OF TROPONIN QUANT: CPT

## 2020-07-09 PROCEDURE — 85025 COMPLETE CBC W/AUTO DIFF WBC: CPT

## 2020-07-09 PROCEDURE — 71046 X-RAY EXAM CHEST 2 VIEWS: CPT

## 2020-07-09 PROCEDURE — 36415 COLL VENOUS BLD VENIPUNCTURE: CPT

## 2020-07-09 PROCEDURE — 99283 EMERGENCY DEPT VISIT LOW MDM: CPT

## 2020-07-09 NOTE — ED PROVIDER NOTES
[address]  [address] [address]  Dept: [phone]  Loc: [address] ENCOUNTER        This patient was not seen or evaluated by the attending physician. I evaluated this patient, the attending physician was available for consultation. CHIEF COMPLAINT    Chief Complaint   Patient presents with    Rectal Bleeding     bright red bleeding onset last night. pt on blood thinners per her report       HPI    Alexandr Johns is a 80 y.o. female who presents to the emergency department with her son for concerns of rectal bleeding. Patient states that she got up in the middle the night to have a bowel movement and noticed bright red blood in the toilet. She states she proceeded to have 3 more normal formed brown bowel movements throughout the day today and each time noticed bright red blood in the toilet bowl. She has denied seeing clots. She states she has a history of external hemorrhoids and she thinks her hemorrhoids have been bleeding because this has happened one other time before. She denies body aches, fever, chills, shortness of breath, chest pain, lightheadedness, dizziness, abdominal pain. He denies having difficult bowel movements. States her stools today have been brown and formed. She lives at home and is independent. REVIEW OF SYSTEMS    General: No fevers or chills  : denies dysuria, no flank pain  GI: No vomiting or diarrhea  Pulmonary: No difficulty breathing or cough  Neurologic: No loss of consciousness or syncope  See HPI for further details. All other systems reviewed and are negative unless noted in the HPI.     PAST MEDICAL & SURGICAL HISTORY    Past Medical History:   Diagnosis Date    Arthritis     Glaucoma     Hyperlipidemia     Hypertension     Pneumonia     TIA (transient ischemic attack)      Past Surgical History:   Procedure Laterality Date    BREAST SURGERY      left lumpectomy    HIP ARTHROPLASTY Left 08/26/2016    hemiarthroplasty left hip    THYROIDECTOMY, PARTIAL      VARICOSE VEIN SURGERY         CURRENT MEDICATIONS    Current Outpatient Rx   Medication Sig Dispense Refill    LUMIGAN 0.01 % SOLN ophthalmic drops Place 1 drop into both eyes every evening      benazepril (LOTENSIN) 20 MG tablet Take 20 mg by mouth daily      amLODIPine (NORVASC) 5 MG tablet Take 1 tablet by mouth daily 30 tablet 3    levothyroxine (SYNTHROID) 75 MCG tablet Take 75 mcg by mouth Daily      aspirin 81 MG tablet Take 81 mg by mouth daily.  lovastatin (MEVACOR) 40 MG tablet Take 40 mg by mouth nightly.  omeprazole (PRILOSEC) 20 MG capsule Take 20 mg by mouth daily.  brimonidine (ALPHAGAN P) 0.1 % SOLN 1 drop every 8 hours      brimonidine (ALPHAGAN) 0.2 % ophthalmic solution 1 drop 3 times daily      Timolol (TIMOPTIC) 0.5 % (DAILY) SOLN ophthalmic solution Place 1 drop into both eyes 2 times daily      RHOPRESSA 0.02 % SOLN Place 1 drop into both eyes 2 times daily         ALLERGIES    Allergies   Allergen Reactions    Sulfa Antibiotics Nausea Only       FAMILY AND SOCIAL HISTORY    History reviewed. No pertinent family history. Social History     Socioeconomic History    Marital status:       Spouse name: None    Number of children: None    Years of education: None    Highest education level: None   Occupational History    Occupation: retired   Social Needs    Financial resource strain: None    Food insecurity     Worry: None     Inability: None    Transportation needs     Medical: None     Non-medical: None   Tobacco Use    Smoking status: Never Smoker    Smokeless tobacco: Never Used   Substance and Sexual Activity    Alcohol use: Yes     Comment: occ    Drug use: No    Sexual activity: None   Lifestyle    Physical activity     Days per week: None     Minutes per session: None    Stress: None   Relationships    Social connections     Talks on phone: None     Gets together: None     Attends Religion service: None     Active member of club or organization: None     Attends meetings of clubs or organizations: None     Relationship status: None    Intimate partner violence     Fear of current or ex partner: None     Emotionally abused: None     Physically abused: None     Forced sexual activity: None   Other Topics Concern    None   Social History Narrative    None       PHYSICAL EXAM    VITAL SIGNS: BP (!) 145/85   Pulse 93   Temp 97.8 °F (36.6 °C) (Tympanic)   Resp 14   Ht 5' 5\" (1.651 m)   SpO2 96%   BMI 16.97 kg/m²    Constitutional:  Well-developed, well-nourished, appears comfortable  Eyes:  Non-icteric sclera, no conjunctival injection   HENT:  Atraumatic, external nose normal.   NECK: Supple, no JVD   Respiratory:  No respiratory distress, normal breath sounds   Cardiovascular: Regular rhythm and rate, S1-S2 radial pedal pulses 2+ equal bilaterally. Brisk capillary refill. No peripheral edema. GI:  Soft, nontender nondistended abdomen without rebound or guarding. Normoactive bowel sounds throughout auscultation. Rectal exam: Patient was placed in the left side-lying position with knees drawn up. She does have several external nonbleeding hemorrhoids. Digital rectal exam was performed and there is soft light brown stool noted in the rectal vault. The patient was noted during the rectal exam to have a blood clot within the labia of the vagina  Pelvic exam: After the digital rectal exam the patient was cleaned up and she was prepared for a pelvic exam.  I did do a to figure swipe in the vaginal canal and noted to medium sized blood clots. I then inserted a speculum and noted the cervix which was closed but she did have a moderate amount of bright red blood in the vaginal vault. The patient tolerated the speculum exam well.   There is no cervical motion tenderness and no masses or adnexal fullness noted on bimanual exam.  Integument:  Nondiaphoretic skin, no obvious rashes  Neurologic: Awake and oriented x4, no slurred speech    RADIOLOGY/PROCEDURES    XR CHEST STANDARD (2 VW)   Final Result   Stable radiographic appearance the chest including a left lower lobe bulla   containing a small amount dependent fluid. EKG  Twelve-lead EKG reviewed by myself and is noted to have ventricular rate of 92 bpm, MN interval 160 ms, QRS duration 86 ms,  ms  There is no ST elevation or depression noted. Interpretation is normal sinus rhythm. Today's tracing was compared to the one on January 2, 2020 with no significant changes and sinus tachycardia now resolved on today's. Labs Reviewed   CBC WITH AUTO DIFFERENTIAL - Abnormal; Notable for the following components:       Result Value    RDW 15.9 (*)     All other components within normal limits    Narrative:     Performed at:  [address] [facility] [place] [employer]   Phone [phone]   BASIC METABOLIC PANEL W/ REFLEX TO MG FOR LOW K - Abnormal; Notable for the following components:    Glucose 106 (*)     All other components within normal limits    Narrative:     Performed at:  [address] [facility] [place] [employer] [address]   Phone [phone]   TROPONIN    Narrative:     Performed at:  [address] [employer] [address]   Phone [phone]       ED COURSE & MEDICAL DECISION MAKING    Pertinent Labs & Imaging studies reviewed and interpreted. (See chart for details)  See chart for details of medications given during the ED stay.     Vitals:    07/09/20 1701 07/09/20 1731 07/09/20 1816 07/09/20 1846   BP: (!) 153/84 (!) 142/84 (!) 141/82 (!) 145/85   Pulse: 95 98 90 93   Resp: 11 13 14 14   Temp:       TempSrc:       SpO2: 98% 99% 98% 96%   Height:         Medications - No data to display    I have seen and evaluated this patient. My attending physician was available for consultation. Differential diagnosis includes but is not limited to GI bleed, thrombosed hemorrhoids bleeding, dysfunctional uterine bleeding, cervical, ovarian or uterine cancer, dehydration, anemia, electrolyte derangement, other. She is nontoxic in appearance and hemodynamically stable. Patient had concerns that she was had rectal bleeding but on further examination the bleeding is noted to come from the vaginal vault. She has never had this problem before. I did discuss the possibilities of what this could mean for a patient in this age with the patient and her son. They verbalized understanding and she is going to need further follow-up with gynecology which I did provide a referral for. I think she is safe to go home at this time. She is hemodynamically stable and her abdominal exam is unremarkable. She is not anemic and she has no leukocytosis and her BMP is unremarkable. She was instructed to return to the emergency department if she developed worsening symptoms or worsening bleeding. The patient and her son verbalized understanding of the discharge instructions. Patient ambulated out of the emergency department with a steady gait unassisted. FINAL IMPRESSION    1. Vaginal bleeding    2.  External hemorrhoids without complication        PLAN  Discharge with outpatient Gynecology follow-up    (Please note that this note was completed with a voice recognition program.  Every attempt was made to edit the dictations, but inevitably there remain words that are mis-transcribed.)           JOSE F Naranjo Res - CNP  07/10/20 0106

## 2020-07-09 NOTE — PROGRESS NOTES
Medication Reconciliation    List of medications patient is currently taking is complete. Source of information: 1. Conversation with patient at bedside                                      2. EPIC records      Allergies  Sulfa antibiotics     Notes regarding home medications:   1. Patient received all of her home medications prior to arrival to the emergency department. 2. Patient was unsure which eye drops she uses, but stated she uses 3 different kinds and knew the color of the tops (dark blue, white, and green). Appears she does take lumigan (green top) and rhopressa (white top), unsure about brimonidine and timolol (neither have a blue top).     Elias Trevino, PharmD.  7/9/2020  5:53 PM

## 2020-07-10 ENCOUNTER — TELEPHONE (OUTPATIENT)
Dept: GYNECOLOGY | Age: 85
End: 2020-07-10

## 2020-07-10 LAB
EKG ATRIAL RATE: 92 BPM
EKG DIAGNOSIS: NORMAL
EKG P AXIS: 49 DEGREES
EKG P-R INTERVAL: 160 MS
EKG Q-T INTERVAL: 386 MS
EKG QRS DURATION: 86 MS
EKG QTC CALCULATION (BAZETT): 477 MS
EKG R AXIS: -31 DEGREES
EKG T AXIS: 8 DEGREES
EKG VENTRICULAR RATE: 92 BPM

## 2020-07-10 PROCEDURE — 93010 ELECTROCARDIOGRAM REPORT: CPT | Performed by: INTERNAL MEDICINE

## 2020-07-10 NOTE — TELEPHONE ENCOUNTER
Put her in for a virtual  Visit first-so I can order her a pelvic ultrasound. See  If you can add her  In next week.

## 2020-07-10 NOTE — TELEPHONE ENCOUNTER
Called patient to schedule her for a virtual visit. Patient stated she does not have a cell phone, she only has a land line. Patient also stated she was told that she needed to make an appointment with you ASAP.

## 2020-07-13 ENCOUNTER — VIRTUAL VISIT (OUTPATIENT)
Dept: GYNECOLOGY | Age: 85
End: 2020-07-13
Payer: MEDICARE

## 2020-07-13 PROCEDURE — 99442 PR PHYS/QHP TELEPHONE EVALUATION 11-20 MIN: CPT | Performed by: OBSTETRICS & GYNECOLOGY

## 2020-07-14 ENCOUNTER — HOSPITAL ENCOUNTER (OUTPATIENT)
Dept: ULTRASOUND IMAGING | Age: 85
Discharge: HOME | End: 2020-07-14
Payer: MEDICARE

## 2020-07-14 PROCEDURE — 76856 US EXAM PELVIC COMPLETE: CPT

## 2020-07-14 PROCEDURE — 76830 TRANSVAGINAL US NON-OB: CPT

## 2020-07-20 ENCOUNTER — TELEPHONE (OUTPATIENT)
Dept: GYNECOLOGY | Age: 85
End: 2020-07-20

## 2020-07-20 NOTE — PROGRESS NOTES
Rosas Cates is a 80 y.o. female evaluated via telephone on 2020. Consent:  She and/or health care decision maker is aware that that she may receive a bill for this telephone service, depending on her insurance coverage, and has provided verbal consent to proceed: Yes      Documentation:  I communicated with the patient and/or health care decision maker about PMB. Details of this discussion including any medical advice provided: Patient with bleeding. Patient in menopause. Patient for pelvic US-plans  Per  This. .Review of Systems: as above  General ROS: negative  Psychological ROS: negative  Ophthalmic ROS: negative  ENT ROS: negative  Allergy and Immunology ROS: negative  Hematological and Lymphatic ROS: negative  Endocrine ROS: negative  Breast ROS: negative  Respiratory ROS: negative  Cardiovascular ROS: negative  Gastrointestinal ROS: negative  Genito-Urinary ROS: as above  Musculoskeletal ROS: negative  Neurological ROS: negative  Dermatological ROS: negative    Date of Birth 1929  Past Medical History:   Diagnosis Date    Arthritis     Glaucoma     Hyperlipidemia     Hypertension     Pneumonia     TIA (transient ischemic attack)      Past Surgical History:   Procedure Laterality Date    BREAST SURGERY      left lumpectomy    HIP ARTHROPLASTY Left 2016    hemiarthroplasty left hip    THYROIDECTOMY, PARTIAL      VARICOSE VEIN SURGERY       OB History    Para Term  AB Living   7 6     1     SAB TAB Ectopic Molar Multiple Live Births             6      # Outcome Date GA Lbr Ash/2nd Weight Sex Delivery Anes PTL Lv   7 AB            6 Para      Vag-Spont      5 Para      Vag-Spont      4 Para      Vag-Spont      3 Para      Vag-Spont      2 Para      Vag-Spont      1 Para      Vag-Spont        Social History     Socioeconomic History    Marital status:       Spouse name: Not on file    Number of children: Not on file    Years of education: Not on file  Highest education level: Not on file   Occupational History    Occupation: retired   Social Needs    Financial resource strain: Not on file    Food insecurity     Worry: Not on file     Inability: Not on file   [employer] needs     Medical: Not on file     Non-medical: Not on file   Tobacco Use    Smoking status: Never Smoker    Smokeless tobacco: Never Used   Substance and Sexual Activity    Alcohol use: Yes     Comment: occ    Drug use: No    Sexual activity: Not on file   Lifestyle    Physical activity     Days per week: Not on file     Minutes per session: Not on file    Stress: Not on file   Relationships    Social connections     Talks on phone: Not on file     Gets together: Not on file     Attends Jehovah's witness service: Not on file     Active member of club or organization: Not on file     Attends meetings of clubs or organizations: Not on file     Relationship status: Not on file    Intimate partner violence     Fear of current or ex partner: Not on file     Emotionally abused: Not on file     Physically abused: Not on file     Forced sexual activity: Not on file   Other Topics Concern    Not on file   Social History Narrative    Not on file     Allergies   Allergen Reactions    Sulfa Antibiotics Nausea Only     Outpatient Medications Marked as Taking for the 7/13/20 encounter (Virtual Visit) with Paul Hines MD   Medication Sig Dispense Refill    brimonidine (ALPHAGAN P) 0.1 % SOLN 1 drop every 8 hours      brimonidine (ALPHAGAN) 0.2 % ophthalmic solution 1 drop 3 times daily      LUMIGAN 0.01 % SOLN ophthalmic drops Place 1 drop into both eyes every evening      Timolol (TIMOPTIC) 0.5 % (DAILY) SOLN ophthalmic solution Place 1 drop into both eyes 2 times daily      RHOPRESSA 0.02 % SOLN Place 1 drop into both eyes 2 times daily      benazepril (LOTENSIN) 20 MG tablet Take 20 mg by mouth daily      amLODIPine (NORVASC) 5 MG tablet Take 1 tablet by mouth daily 30 tablet 3  levothyroxine (SYNTHROID) 75 MCG tablet Take 75 mcg by mouth Daily      aspirin 81 MG tablet Take 81 mg by mouth daily.  lovastatin (MEVACOR) 40 MG tablet Take 40 mg by mouth nightly.  omeprazole (PRILOSEC) 20 MG capsule Take 20 mg by mouth daily. I affirm this is a Patient Initiated Episode with a Patient who has not had a related appointment within my department in the past 7 days or scheduled within the next 24 hours.     Patient identification was verified at the start of the visit: Yes    Total Time: minutes: 11-20 minutes    Note: not billable if this call serves to triage the patient into an appointment for the relevant concern      Melissa Ortiz

## 2020-07-20 NOTE — TELEPHONE ENCOUNTER
Called and spoke to patient, gave her results. Patient stated she was told she had cancer at the hospital during her [address]. Informed patient that if she had more of a serious issue and if Dr. Ria Thomson thought it was cancer she would of called her and told her that. Also, informed patient that the biopsy would further detect of what is going on and give additional details. Explained the [address] to patient and told patient that our office would call her back with a date and time for the procedure. Also, let patient know with the covid 19 we are limited to visit in the office a day. Patient understood.

## 2020-08-10 ENCOUNTER — TELEPHONE (OUTPATIENT)
Dept: GYNECOLOGY | Age: 85
End: 2020-08-10

## 2020-08-10 NOTE — TELEPHONE ENCOUNTER
Tell patient she should take Aleve, Tylenol or Ibuprofen prior to her visit. Whatever works best for her.

## 2020-08-10 NOTE — TELEPHONE ENCOUNTER
Patient calling to see if she should take Aleve prior to her appt tomorrow.  She is scheduled for ENDOMETRIAL BX

## 2020-08-11 ENCOUNTER — OFFICE VISIT (OUTPATIENT)
Dept: GYNECOLOGY | Age: 85
End: 2020-08-11
Payer: MEDICARE

## 2020-08-11 VITALS
HEIGHT: 66 IN | BODY MASS INDEX: 16.76 KG/M2 | SYSTOLIC BLOOD PRESSURE: 135 MMHG | HEART RATE: 98 BPM | WEIGHT: 104.25 LBS | DIASTOLIC BLOOD PRESSURE: 91 MMHG | TEMPERATURE: 97.1 F | RESPIRATION RATE: 17 BRPM

## 2020-08-11 PROCEDURE — 58100 BIOPSY OF UTERUS LINING: CPT | Performed by: OBSTETRICS & GYNECOLOGY

## 2020-08-11 PROCEDURE — 99213 OFFICE O/P EST LOW 20 MIN: CPT | Performed by: OBSTETRICS & GYNECOLOGY

## 2020-08-11 PROCEDURE — 4040F PNEUMOC VAC/ADMIN/RCVD: CPT | Performed by: OBSTETRICS & GYNECOLOGY

## 2020-08-11 PROCEDURE — 1123F ACP DISCUSS/DSCN MKR DOCD: CPT | Performed by: OBSTETRICS & GYNECOLOGY

## 2020-08-11 PROCEDURE — G8427 DOCREV CUR MEDS BY ELIG CLIN: HCPCS | Performed by: OBSTETRICS & GYNECOLOGY

## 2020-08-11 PROCEDURE — 1090F PRES/ABSN URINE INCON ASSESS: CPT | Performed by: OBSTETRICS & GYNECOLOGY

## 2020-08-11 PROCEDURE — G8419 CALC BMI OUT NRM PARAM NOF/U: HCPCS | Performed by: OBSTETRICS & GYNECOLOGY

## 2020-08-11 PROCEDURE — 1036F TOBACCO NON-USER: CPT | Performed by: OBSTETRICS & GYNECOLOGY

## 2020-08-11 RX ORDER — TRIAMCINOLONE ACETONIDE 1 MG/G
CREAM TOPICAL 2 TIMES DAILY
Status: ON HOLD | COMMUNITY
End: 2021-11-14 | Stop reason: ALTCHOICE

## 2020-08-11 RX ORDER — FLUCONAZOLE 100 MG/1
100 TABLET ORAL DAILY
Qty: 7 TABLET | Refills: 0 | Status: SHIPPED | OUTPATIENT
Start: 2020-08-11 | End: 2020-08-18

## 2020-08-11 RX ORDER — NYSTATIN 100000 [USP'U]/G
POWDER TOPICAL 2 TIMES DAILY
Qty: 1 BOTTLE | Refills: 5 | Status: SHIPPED | OUTPATIENT
Start: 2020-08-11 | End: 2020-09-10

## 2020-08-11 RX ORDER — NYSTATIN 100000 [USP'U]/G
POWDER TOPICAL 2 TIMES DAILY
Qty: 1 BOTTLE | Refills: 5 | Status: SHIPPED | OUTPATIENT
Start: 2020-08-11 | End: 2020-08-11 | Stop reason: SDUPTHER

## 2020-08-13 LAB
HPV COMMENT: NORMAL
HPV TYPE 16: NOT DETECTED
HPV TYPE 18: NOT DETECTED
HPVOH (OTHER TYPES): NOT DETECTED

## 2020-08-15 ASSESSMENT — ENCOUNTER SYMPTOMS
RESPIRATORY NEGATIVE: 1
EYES NEGATIVE: 1
GASTROINTESTINAL NEGATIVE: 1
ABDOMINAL PAIN: 0

## 2020-08-16 NOTE — PROGRESS NOTES
education level: Not on file   Occupational History    Occupation: retired   Social Needs    Financial resource strain: Not on file    Food insecurity     Worry: Not on file     Inability: Not on file   [employer] needs     Medical: Not on file     Non-medical: Not on file   Tobacco Use    Smoking status: Never Smoker    Smokeless tobacco: Never Used   Substance and Sexual Activity    Alcohol use: Yes     Comment: occ    Drug use: No    Sexual activity: Not Currently   Lifestyle    Physical activity     Days per week: Not on file     Minutes per session: Not on file    Stress: Not on file   Relationships    Social connections     Talks on phone: Not on file     Gets together: Not on file     Attends Anabaptism service: Not on file     Active member of club or organization: Not on file     Attends meetings of clubs or organizations: Not on file     Relationship status: Not on file    Intimate partner violence     Fear of current or ex partner: Not on file     Emotionally abused: Not on file     Physically abused: Not on file     Forced sexual activity: Not on file   Other Topics Concern    Not on file   Social History Narrative    Not on file     Allergies   Allergen Reactions    Sulfa Antibiotics Nausea Only     Outpatient Medications Marked as Taking for the 8/11/20 encounter (Office Visit) with Ashley Bhardwaj MD   Medication Sig Dispense Refill    triamcinolone (KENALOG) 0.1 % cream Apply topically 2 times daily Apply topically 2 times daily.       fluconazole (DIFLUCAN) 100 MG tablet Take 1 tablet by mouth daily for 7 days Hold cholesterol medication while taking 7 tablet 0    nystatin (MYCOSTATIN) 052774 UNIT/GM powder Apply topically 2 times daily 1 Bottle 5    brimonidine (ALPHAGAN P) 0.1 % SOLN 1 drop every 8 hours      brimonidine (ALPHAGAN) 0.2 % ophthalmic solution 1 drop 3 times daily      LUMIGAN 0.01 % SOLN ophthalmic drops Place 1 drop into both eyes every evening      Timolol (TIMOPTIC) 0.5 % (DAILY) SOLN ophthalmic solution Place 1 drop into both eyes 2 times daily      RHOPRESSA 0.02 % SOLN Place 1 drop into both eyes 2 times daily      benazepril (LOTENSIN) 20 MG tablet Take 20 mg by mouth daily      amLODIPine (NORVASC) 5 MG tablet Take 1 tablet by mouth daily 30 tablet 3    levothyroxine (SYNTHROID) 75 MCG tablet Take 75 mcg by mouth Daily      aspirin 81 MG tablet Take 81 mg by mouth daily.  lovastatin (MEVACOR) 40 MG tablet Take 40 mg by mouth nightly.  omeprazole (PRILOSEC) 20 MG capsule Take 20 mg by mouth daily. History reviewed. No pertinent family history. BP (!) 135/91 (Site: Right Upper Arm, Position: Sitting, Cuff Size: Medium Adult)   Pulse 98   Temp 97.1 °F (36.2 °C) (Oral)   Resp 17   Ht 5' 6\" (1.676 m)   Wt 104 lb 4 oz (47.3 kg)   Breastfeeding No   BMI 16.83 kg/m²       Objective:   Physical Exam  Constitutional:       General: She is not in acute distress. Appearance: She is well-developed. She is not diaphoretic. HENT:      Head: Normocephalic. Eyes:      Pupils: Pupils are equal, round, and reactive to light. Abdominal:      General: There is no distension. Palpations: Abdomen is soft. There is no mass. Tenderness: There is no abdominal tenderness. There is no guarding or rebound. Genitourinary:     Labia:         Right: Rash present. No tenderness, lesion or injury. Left: Rash present. No tenderness, lesion or injury. Urethra: Urethral lesion present. Vagina: No signs of injury and foreign body. Vaginal discharge present. No erythema, tenderness or bleeding. Cervix: No cervical motion tenderness, discharge or friability. Uterus: Not deviated, not enlarged, not fixed and not tender. Adnexa:         Right: No mass, tenderness or fullness. Left: No mass, tenderness or fullness. Rectum: No external hemorrhoid. Comments:   Actually-?  Urethrocele or completely stopped. Attending Physician Documentation:  I was present for or participated in the entire procedure, including opening and closing.          Melissa Ortiz MD

## 2020-08-17 ENCOUNTER — TELEPHONE (OUTPATIENT)
Dept: GYNECOLOGY | Age: 85
End: 2020-08-17

## 2020-08-17 RX ORDER — CLOBETASOL PROPIONATE 0.5 MG/G
OINTMENT TOPICAL
Qty: 1 TUBE | Refills: 1 | OUTPATIENT
Start: 2020-08-17 | End: 2021-02-04 | Stop reason: SDUPTHER

## 2020-08-17 NOTE — TELEPHONE ENCOUNTER
Call patient that her endometrial biopsy was negative-it did not show a lot of endometrial cells but I feel like there was not a lot of tissue in there. Tell her that her pap smear is normal.   Tell patient I would like to repeat her pelvic ultrasound in 3 months. As far as itching, Call in temovate ointment 0.05%. apply twice a day for 30 days with 1 refill.

## 2020-08-17 NOTE — TELEPHONE ENCOUNTER
Spoke with patient advised and understood, script called in Q086  [facility] [address]

## 2020-08-17 NOTE — TELEPHONE ENCOUNTER
Patient calling to state that the pill and powder that Dr. Lynne Parada prescribed on Tuesday are not working.   Symptoms: still itching  [facility] [address], [address] [phone] Kristopher Shah [phone]  Patient can be reached at [phone]

## 2020-10-15 ENCOUNTER — HOSPITAL ENCOUNTER (OUTPATIENT)
Dept: ULTRASOUND IMAGING | Age: 85
Discharge: HOME | End: 2020-10-15
Payer: MEDICARE

## 2020-10-15 PROCEDURE — 76830 TRANSVAGINAL US NON-OB: CPT

## 2020-10-15 PROCEDURE — 76856 US EXAM PELVIC COMPLETE: CPT

## 2020-10-20 ENCOUNTER — TELEPHONE (OUTPATIENT)
Dept: GYNECOLOGY | Age: 85
End: 2020-10-20

## 2020-10-20 NOTE — TELEPHONE ENCOUNTER
Results of pelvic ultrasound are in but no comments are listed    Patient can be reached at [phone]    Please advise

## 2021-02-04 DIAGNOSIS — N89.8 VAGINAL ITCHING: ICD-10-CM

## 2021-02-04 RX ORDER — CLOBETASOL PROPIONATE 0.5 MG/G
OINTMENT TOPICAL
Qty: 1 TUBE | Refills: 3 | Status: SHIPPED | OUTPATIENT
Start: 2021-02-04

## 2021-04-21 ENCOUNTER — OFFICE VISIT (OUTPATIENT)
Dept: ORTHOPEDIC SURGERY | Age: 86
End: 2021-04-21
Payer: MEDICARE

## 2021-04-21 VITALS — RESPIRATION RATE: 14 BRPM | WEIGHT: 110 LBS | BODY MASS INDEX: 17.68 KG/M2 | HEIGHT: 66 IN

## 2021-04-21 DIAGNOSIS — S72.002A LEFT DISPLACED FEMORAL NECK FRACTURE (HCC): ICD-10-CM

## 2021-04-21 DIAGNOSIS — M54.32 SCIATICA OF LEFT SIDE: Primary | ICD-10-CM

## 2021-04-21 PROCEDURE — G8419 CALC BMI OUT NRM PARAM NOF/U: HCPCS | Performed by: ORTHOPAEDIC SURGERY

## 2021-04-21 PROCEDURE — 4040F PNEUMOC VAC/ADMIN/RCVD: CPT | Performed by: ORTHOPAEDIC SURGERY

## 2021-04-21 PROCEDURE — 1090F PRES/ABSN URINE INCON ASSESS: CPT | Performed by: ORTHOPAEDIC SURGERY

## 2021-04-21 PROCEDURE — G8427 DOCREV CUR MEDS BY ELIG CLIN: HCPCS | Performed by: ORTHOPAEDIC SURGERY

## 2021-04-21 PROCEDURE — 99203 OFFICE O/P NEW LOW 30 MIN: CPT | Performed by: ORTHOPAEDIC SURGERY

## 2021-04-21 PROCEDURE — 1036F TOBACCO NON-USER: CPT | Performed by: ORTHOPAEDIC SURGERY

## 2021-04-21 PROCEDURE — 1123F ACP DISCUSS/DSCN MKR DOCD: CPT | Performed by: ORTHOPAEDIC SURGERY

## 2021-04-21 RX ORDER — PREDNISONE 10 MG/1
TABLET ORAL
Qty: 26 TABLET | Refills: 0 | Status: ON HOLD | OUTPATIENT
Start: 2021-04-21 | End: 2021-11-14 | Stop reason: ALTCHOICE

## 2021-04-22 NOTE — PROGRESS NOTES
CHIEF COMPLAINT: Left posterior hip pain/  Sciatica. HISTORY:  David Guerrero is a 80y.o. year old female who is seen today for evaluation of left posterior hip pain that radiate to the leg. She reports that this started to worsen March 2021 and is worsening. with no specific injury that started the pain. She reports that the pain is worse with activity and better with rest. Rates pain a 1010 VAS and radiates down her leg. She reports that the pain is aching and dull in nature. She has been using ambulatory aids. Denies recent treatment. She is well known to me for a left femoral neck displaced fracture, s/p press fit bipolar hemiarthroplasty on 8/26/2016 and had been doing well with that. Denies smoking. Past Medical History:   Diagnosis Date    Arthritis     Endometrial thickening on ultrasound     Glaucoma     Hyperlipidemia     Hypertension     Pneumonia     Post-menopausal bleeding     TIA (transient ischemic attack)        Past Surgical History:   Procedure Laterality Date    BREAST SURGERY      left lumpectomy    HIP ARTHROPLASTY Left 08/26/2016    hemiarthroplasty left hip    THYROIDECTOMY, PARTIAL      VARICOSE VEIN SURGERY         Social History     Socioeconomic History    Marital status:       Spouse name: Not on file    Number of children: Not on file    Years of education: Not on file    Highest education level: Not on file   Occupational History    Occupation: retired   Social Needs    Financial resource strain: Not on file    Food insecurity     Worry: Not on file     Inability: Not on file   [employer] needs     Medical: Not on file     Non-medical: Not on file   Tobacco Use    Smoking status: Never Smoker    Smokeless tobacco: Never Used   Substance and Sexual Activity    Alcohol use: Yes     Comment: occ    Drug use: No    Sexual activity: Not Currently   Lifestyle    Physical activity     Days per week: Not on file     Minutes per session: Not on file    Stress: Not on file   Relationships    Social connections     Talks on phone: Not on file     Gets together: Not on file     Attends Church service: Not on file     Active member of club or organization: Not on file     Attends meetings of clubs or organizations: Not on file     Relationship status: Not on file    Intimate partner violence     Fear of current or ex partner: Not on file     Emotionally abused: Not on file     Physically abused: Not on file     Forced sexual activity: Not on file   Other Topics Concern    Not on file   Social History Narrative    Not on file       History reviewed. No pertinent family history. Current Outpatient Medications on File Prior to Visit   Medication Sig Dispense Refill    clobetasol (TEMOVATE) 0.05 % ointment Apply topically 2 times daily. 1 Tube 3    triamcinolone (KENALOG) 0.1 % cream Apply topically 2 times daily Apply topically 2 times daily.  brimonidine (ALPHAGAN P) 0.1 % SOLN 1 drop every 8 hours      brimonidine (ALPHAGAN) 0.2 % ophthalmic solution 1 drop 3 times daily      LUMIGAN 0.01 % SOLN ophthalmic drops Place 1 drop into both eyes every evening      Timolol (TIMOPTIC) 0.5 % (DAILY) SOLN ophthalmic solution Place 1 drop into both eyes 2 times daily      RHOPRESSA 0.02 % SOLN Place 1 drop into both eyes 2 times daily      benazepril (LOTENSIN) 20 MG tablet Take 20 mg by mouth daily      amLODIPine (NORVASC) 5 MG tablet Take 1 tablet by mouth daily 30 tablet 3    levothyroxine (SYNTHROID) 75 MCG tablet Take 75 mcg by mouth Daily      aspirin 81 MG tablet Take 81 mg by mouth daily.  lovastatin (MEVACOR) 40 MG tablet Take 40 mg by mouth nightly.  omeprazole (PRILOSEC) 20 MG capsule Take 20 mg by mouth daily. No current facility-administered medications on file prior to visit.         Pertinent items are noted in HPI  Review of systems reviewed from Patient History Form dated on 4/21/2021 and available in the

## 2021-04-24 PROBLEM — M54.32 SCIATICA OF LEFT SIDE: Status: ACTIVE | Noted: 2021-04-24

## 2021-05-04 ENCOUNTER — OFFICE VISIT (OUTPATIENT)
Dept: ORTHOPEDIC SURGERY | Age: 86
End: 2021-05-04
Payer: MEDICARE

## 2021-05-04 VITALS — WEIGHT: 110 LBS | BODY MASS INDEX: 17.68 KG/M2 | HEIGHT: 66 IN

## 2021-05-04 DIAGNOSIS — S32.040A CLOSED COMPRESSION FRACTURE OF L4 VERTEBRA, INITIAL ENCOUNTER (HCC): ICD-10-CM

## 2021-05-04 DIAGNOSIS — M41.50 DEGENERATIVE SCOLIOSIS: ICD-10-CM

## 2021-05-04 DIAGNOSIS — M54.50 LUMBAR PAIN: Primary | ICD-10-CM

## 2021-05-04 PROCEDURE — G8419 CALC BMI OUT NRM PARAM NOF/U: HCPCS | Performed by: PHYSICIAN ASSISTANT

## 2021-05-04 PROCEDURE — 99214 OFFICE O/P EST MOD 30 MIN: CPT | Performed by: PHYSICIAN ASSISTANT

## 2021-05-04 PROCEDURE — 1123F ACP DISCUSS/DSCN MKR DOCD: CPT | Performed by: PHYSICIAN ASSISTANT

## 2021-05-04 PROCEDURE — 1090F PRES/ABSN URINE INCON ASSESS: CPT | Performed by: PHYSICIAN ASSISTANT

## 2021-05-04 PROCEDURE — 1036F TOBACCO NON-USER: CPT | Performed by: PHYSICIAN ASSISTANT

## 2021-05-04 PROCEDURE — 4040F PNEUMOC VAC/ADMIN/RCVD: CPT | Performed by: PHYSICIAN ASSISTANT

## 2021-05-04 PROCEDURE — G8427 DOCREV CUR MEDS BY ELIG CLIN: HCPCS | Performed by: PHYSICIAN ASSISTANT

## 2021-05-04 NOTE — PROGRESS NOTES
New Patient: LUMBAR SPINE    Referring Provider:  No ref. provider found    CHIEF COMPLAINT:    Chief Complaint   Patient presents with    Back Pain     Patient states that pain began about 2 weeks ago. Complains of all right sided low back pain. Had steroids with little to no relief. HISTORY OF PRESENT ILLNESS:       Ms. Michael Maldonado  is a pleasant 80 y.o. female here for evaluation regarding her right LBP. She states her pain began insidiously about 6 weeks ago. Her pain has steadily continued since then. She rates her back pain 6/10 currently but prior to her steroids it was 9/10. She describes the pain as constant. Pain is worse with changing positions and improved some with sitting for period time and lying on her side. She denies any radiation pain into either lower extremity. She denies numbness and tingling in her right or left leg. She denies weakness of her right or left leg and denies bowel or bladder dysfunction. She states she can sit for a maximum of less than an hour and stand for a maximum 5 to 10 minutes. The pain at times disrupts her sleep. Pain Assessment  Location of Pain: Back  Location Modifiers: Medial, Right  Severity of Pain: 6  Quality of Pain: Dull, Aching  Duration of Pain: Persistent  Frequency of Pain: Constant  Aggravating Factors:  Other (Comment)  Limiting Behavior: Yes  Relieving Factors: Rest  Result of Injury: No  Work-Related Injury: No  Are there other pain locations you wish to document?: No]    Current/Past Treatment:   · Physical Therapy: None  · Chiropractic: None  · Injection: None  · Medications: Steroid taper    Past Medical History:   Past Medical History:   Diagnosis Date    Arthritis     Endometrial thickening on ultrasound     Glaucoma     Hyperlipidemia     Hypertension     Pneumonia     Post-menopausal bleeding     TIA (transient ischemic attack)         Past Surgical History:     Past Surgical History:   Procedure Laterality Date    BREAST SURGERY      left lumpectomy    HIP ARTHROPLASTY Left 08/26/2016    hemiarthroplasty left hip    THYROIDECTOMY, PARTIAL      VARICOSE VEIN SURGERY         Current Medications:     Current Outpatient Medications:     predniSONE (DELTASONE) 10 MG tablet, take 3 tabs PO BID x2 days, then 2 tabs PO BID x2 days, then 1 tab PO BID x2 days, then 1 tab PO daily x2 days, Disp: 26 tablet, Rfl: 0    clobetasol (TEMOVATE) 0.05 % ointment, Apply topically 2 times daily. , Disp: 1 Tube, Rfl: 3    triamcinolone (KENALOG) 0.1 % cream, Apply topically 2 times daily Apply topically 2 times daily. , Disp: , Rfl:     brimonidine (ALPHAGAN P) 0.1 % SOLN, 1 drop every 8 hours, Disp: , Rfl:     brimonidine (ALPHAGAN) 0.2 % ophthalmic solution, 1 drop 3 times daily, Disp: , Rfl:     LUMIGAN 0.01 % SOLN ophthalmic drops, Place 1 drop into both eyes every evening, Disp: , Rfl:     Timolol (TIMOPTIC) 0.5 % (DAILY) SOLN ophthalmic solution, Place 1 drop into both eyes 2 times daily, Disp: , Rfl:     RHOPRESSA 0.02 % SOLN, Place 1 drop into both eyes 2 times daily, Disp: , Rfl:     benazepril (LOTENSIN) 20 MG tablet, Take 20 mg by mouth daily, Disp: , Rfl:     amLODIPine (NORVASC) 5 MG tablet, Take 1 tablet by mouth daily, Disp: 30 tablet, Rfl: 3    levothyroxine (SYNTHROID) 75 MCG tablet, Take 75 mcg by mouth Daily, Disp: , Rfl:     aspirin 81 MG tablet, Take 81 mg by mouth daily. , Disp: , Rfl:     lovastatin (MEVACOR) 40 MG tablet, Take 40 mg by mouth nightly., Disp: , Rfl:     omeprazole (PRILOSEC) 20 MG capsule, Take 20 mg by mouth daily. , Disp: , Rfl:     Allergies:  Sulfa antibiotics    Social History:    reports that she has never smoked. She has never used smokeless tobacco. She reports current alcohol use. She reports that she does not use drugs. Family History:   No family history on file.     REVIEW OF SYSTEMS: Full ROS noted & scanned   CONSTITUTIONAL: Denies unexplained weight loss, fevers, chills or fatigue NEUROLOGICAL: Denies unsteady gait or progressive weakness  MUSCULOSKELETAL: Denies joint swelling or redness  PSYCHOLOGICAL: Denies anxiety, depression   SKIN: Denies skin changes, delayed healing, rash, itching   HEMATOLOGIC: Denies easy bleeding or bruising  ENDOCRINE: Denies excessive thirst, urination, heat/cold  RESPIRATORY: Denies current dyspnea, cough  GI: Denies nausea, vomiting, diarrhea   : Denies bowel or bladder issues      PHYSICAL EXAM:    Vitals: Height 5' 6\" (1.676 m), weight 110 lb (49.9 kg), not currently breastfeeding. GENERAL EXAM:  · General Apparence: Patient is adequately groomed with no evidence of malnutrition. · Orientation: The patient is oriented to time, place and person. · Mood & Affect:The patient's mood and affect are appropriate. · Vascular: Examination reveals no swelling tenderness in upper or lower extremities. Good capillary refill. · Lymphatic: The lymphatic examination bilaterally reveals all areas to be without enlargement or induration  · Sensation: Sensation is intact without deficit  · Coordination/Balance: Good coordination. Tandem walking difficult due to back pain. LUMBAR/SACRAL EXAMINATION:  · Inspection: Local inspection shows no step-off or bruising. Lumbar alignment is normal.  Sagittal and Coronal balance is neutral.      · Palpation:   No evidence of tenderness at the midline. No tenderness bilaterally at the paraspinal or trochanters. There is no step-off or paraspinal spasm. · Range of Motion: Lumbar flexion, extension and rotation are mildly limited due to pain. · Strength:   Strength testing is 5/5 in all muscle groups tested. · Special Tests:   Straight leg raise and crossed SLR negative. Leg length and pelvis level. · Skin: There are no rashes, ulcerations or lesions. · Reflexes: Reflexes are symmetrically 2+ at the patellar and ankle tendons. Clonus absent bilaterally at the feet.   · Gait & station: Patient ambulates with a forward flexed position with a cane in the right hand    · Additional Examinations:   · RIGHT LOWER EXTREMITY: Inspection/examination of the right lower extremity does not show any tenderness, deformity or injury. Range of motion is unremarkable. There is no gross instability. There are no rashes, ulcerations or lesions. Strength and tone are normal.  · LEFT LOWER EXTREMITY:  Inspection/examination of the left lower extremity does not show any tenderness, deformity or injury. Range of motion is unremarkable. There is no gross instability. There are no rashes, ulcerations or lesions. Strength and tone are normal.    Diagnostic Testing:    X-rays: 2 views of the lumbar spine to include AP and lateral were obtained in the office today and reviewed with the patient and her daughter which reveals severe degenerative lumbar scoliosis with multilevel spondylosis    Impression:   Degenerative scoliosis  Lumbar spondylosis      Plan:      We discussed the diagnosis and treatment options including observation, additional oral steroids, physical therapy, epidural injections and additional imaging. She wishes to proceed with physical therapy at [facility] and she finds that she has had no significant improvement with the therapy she will contact the office for scheduling of an MRI. Follow up -as needed    Total evaluation time: 30 minutes    Patient examined and note dictated by Satish Jacobs PA-C. Patient also seen and examined by Dr. Danisha Norwood.

## 2021-05-21 ENCOUNTER — TELEPHONE (OUTPATIENT)
Dept: ORTHOPEDIC SURGERY | Age: 86
End: 2021-05-21

## 2021-05-21 DIAGNOSIS — M54.50 LUMBAR PAIN: Primary | ICD-10-CM

## 2021-05-21 DIAGNOSIS — S32.040A CLOSED COMPRESSION FRACTURE OF L4 VERTEBRA, INITIAL ENCOUNTER (HCC): ICD-10-CM

## 2021-05-21 RX ORDER — TRAMADOL HYDROCHLORIDE 50 MG/1
50 TABLET ORAL EVERY 6 HOURS PRN
Qty: 28 TABLET | Refills: 0 | Status: SHIPPED | OUTPATIENT
Start: 2021-05-21 | End: 2021-06-21

## 2021-06-17 DIAGNOSIS — M54.50 LUMBAR PAIN: ICD-10-CM

## 2021-06-21 RX ORDER — TRAMADOL HYDROCHLORIDE 50 MG/1
TABLET ORAL
Qty: 28 TABLET | Refills: 0 | Status: SHIPPED | OUTPATIENT
Start: 2021-06-21 | End: 2021-06-28

## 2021-11-13 ENCOUNTER — APPOINTMENT (OUTPATIENT)
Dept: GENERAL RADIOLOGY | Age: 86
DRG: 552 | End: 2021-11-13
Payer: MEDICARE

## 2021-11-13 ENCOUNTER — APPOINTMENT (OUTPATIENT)
Dept: CT IMAGING | Age: 86
DRG: 552 | End: 2021-11-13
Payer: MEDICARE

## 2021-11-13 ENCOUNTER — HOSPITAL ENCOUNTER (INPATIENT)
Age: 86
LOS: 1 days | Discharge: NURSING, REHAB OR LONG-TERM CARE | DRG: 552 | End: 2021-11-16
Attending: INTERNAL MEDICINE | Admitting: INTERNAL MEDICINE
Payer: MEDICARE

## 2021-11-13 DIAGNOSIS — R31.9 URINARY TRACT INFECTION WITH HEMATURIA, SITE UNSPECIFIED: Primary | ICD-10-CM

## 2021-11-13 DIAGNOSIS — M25.552 LEFT HIP PAIN: ICD-10-CM

## 2021-11-13 DIAGNOSIS — R26.2 UNABLE TO AMBULATE: ICD-10-CM

## 2021-11-13 DIAGNOSIS — N39.0 URINARY TRACT INFECTION WITH HEMATURIA, SITE UNSPECIFIED: Primary | ICD-10-CM

## 2021-11-13 DIAGNOSIS — R94.31 ABNORMAL EKG: ICD-10-CM

## 2021-11-13 PROBLEM — M54.50 LOW BACK PAIN: Status: ACTIVE | Noted: 2021-11-13

## 2021-11-13 LAB
ANION GAP SERPL CALCULATED.3IONS-SCNC: 14 MMOL/L (ref 3–16)
BACTERIA: ABNORMAL /HPF
BASOPHILS ABSOLUTE: 0 K/UL (ref 0–0.2)
BASOPHILS RELATIVE PERCENT: 0.4 %
BILIRUBIN URINE: NEGATIVE
BLOOD, URINE: ABNORMAL
BUN BLDV-MCNC: 12 MG/DL (ref 7–20)
CALCIUM SERPL-MCNC: 9 MG/DL (ref 8.3–10.6)
CHLORIDE BLD-SCNC: 98 MMOL/L (ref 99–110)
CLARITY: ABNORMAL
CO2: 23 MMOL/L (ref 21–32)
COLOR: YELLOW
COMMENT UA: ABNORMAL
CREAT SERPL-MCNC: 0.5 MG/DL (ref 0.6–1.2)
EOSINOPHILS ABSOLUTE: 0.1 K/UL (ref 0–0.6)
EOSINOPHILS RELATIVE PERCENT: 0.7 %
EPITHELIAL CELLS, UA: 7 /HPF (ref 0–5)
GFR AFRICAN AMERICAN: >60
GFR NON-AFRICAN AMERICAN: >60
GLUCOSE BLD-MCNC: 106 MG/DL (ref 70–99)
GLUCOSE URINE: NEGATIVE MG/DL
HCT VFR BLD CALC: 39.4 % (ref 36–48)
HEMOGLOBIN: 12.8 G/DL (ref 12–16)
HYALINE CASTS: 2 /LPF (ref 0–8)
KETONES, URINE: ABNORMAL MG/DL
LEUKOCYTE ESTERASE, URINE: ABNORMAL
LYMPHOCYTES ABSOLUTE: 1.4 K/UL (ref 1–5.1)
LYMPHOCYTES RELATIVE PERCENT: 16.1 %
MCH RBC QN AUTO: 29.9 PG (ref 26–34)
MCHC RBC AUTO-ENTMCNC: 32.6 G/DL (ref 31–36)
MCV RBC AUTO: 91.8 FL (ref 80–100)
MICROSCOPIC EXAMINATION: YES
MONOCYTES ABSOLUTE: 1 K/UL (ref 0–1.3)
MONOCYTES RELATIVE PERCENT: 11.4 %
NEUTROPHILS ABSOLUTE: 6.1 K/UL (ref 1.7–7.7)
NEUTROPHILS RELATIVE PERCENT: 71.4 %
NITRITE, URINE: POSITIVE
PDW BLD-RTO: 15.1 % (ref 12.4–15.4)
PH UA: 6 (ref 5–8)
PLATELET # BLD: 268 K/UL (ref 135–450)
PMV BLD AUTO: 7.2 FL (ref 5–10.5)
POTASSIUM REFLEX MAGNESIUM: 3.8 MMOL/L (ref 3.5–5.1)
PROTEIN UA: NEGATIVE MG/DL
RBC # BLD: 4.29 M/UL (ref 4–5.2)
RBC UA: 46 /HPF (ref 0–4)
SODIUM BLD-SCNC: 135 MMOL/L (ref 136–145)
SPECIFIC GRAVITY UA: 1.01 (ref 1–1.03)
URINE REFLEX TO CULTURE: YES
URINE TYPE: ABNORMAL
UROBILINOGEN, URINE: 1 E.U./DL
WBC # BLD: 8.6 K/UL (ref 4–11)
WBC UA: 36 /HPF (ref 0–5)

## 2021-11-13 PROCEDURE — 6360000002 HC RX W HCPCS: Performed by: PHYSICIAN ASSISTANT

## 2021-11-13 PROCEDURE — G0378 HOSPITAL OBSERVATION PER HR: HCPCS

## 2021-11-13 PROCEDURE — 72131 CT LUMBAR SPINE W/O DYE: CPT

## 2021-11-13 PROCEDURE — 96375 TX/PRO/DX INJ NEW DRUG ADDON: CPT

## 2021-11-13 PROCEDURE — 85025 COMPLETE CBC W/AUTO DIFF WBC: CPT

## 2021-11-13 PROCEDURE — 87186 SC STD MICRODIL/AGAR DIL: CPT

## 2021-11-13 PROCEDURE — 81001 URINALYSIS AUTO W/SCOPE: CPT

## 2021-11-13 PROCEDURE — 87077 CULTURE AEROBIC IDENTIFY: CPT

## 2021-11-13 PROCEDURE — 80048 BASIC METABOLIC PNL TOTAL CA: CPT

## 2021-11-13 PROCEDURE — 2580000003 HC RX 258: Performed by: PHYSICIAN ASSISTANT

## 2021-11-13 PROCEDURE — 71045 X-RAY EXAM CHEST 1 VIEW: CPT

## 2021-11-13 PROCEDURE — 87086 URINE CULTURE/COLONY COUNT: CPT

## 2021-11-13 PROCEDURE — 87184 SC STD DISK METHOD PER PLATE: CPT

## 2021-11-13 PROCEDURE — 6370000000 HC RX 637 (ALT 250 FOR IP): Performed by: INTERNAL MEDICINE

## 2021-11-13 PROCEDURE — 96372 THER/PROPH/DIAG INJ SC/IM: CPT

## 2021-11-13 PROCEDURE — 93005 ELECTROCARDIOGRAM TRACING: CPT | Performed by: PHYSICIAN ASSISTANT

## 2021-11-13 PROCEDURE — 99284 EMERGENCY DEPT VISIT MOD MDM: CPT

## 2021-11-13 PROCEDURE — 73700 CT LOWER EXTREMITY W/O DYE: CPT

## 2021-11-13 PROCEDURE — 6360000002 HC RX W HCPCS: Performed by: INTERNAL MEDICINE

## 2021-11-13 PROCEDURE — 96365 THER/PROPH/DIAG IV INF INIT: CPT

## 2021-11-13 RX ORDER — SODIUM CHLORIDE 0.9 % (FLUSH) 0.9 %
5-40 SYRINGE (ML) INJECTION EVERY 12 HOURS SCHEDULED
Status: DISCONTINUED | OUTPATIENT
Start: 2021-11-13 | End: 2021-11-16 | Stop reason: HOSPADM

## 2021-11-13 RX ORDER — LEVOTHYROXINE SODIUM 0.07 MG/1
75 TABLET ORAL DAILY
Status: DISCONTINUED | OUTPATIENT
Start: 2021-11-13 | End: 2021-11-16 | Stop reason: HOSPADM

## 2021-11-13 RX ORDER — ONDANSETRON 2 MG/ML
4 INJECTION INTRAMUSCULAR; INTRAVENOUS EVERY 6 HOURS PRN
Status: DISCONTINUED | OUTPATIENT
Start: 2021-11-13 | End: 2021-11-16 | Stop reason: HOSPADM

## 2021-11-13 RX ORDER — ACETAMINOPHEN 650 MG/1
650 SUPPOSITORY RECTAL EVERY 6 HOURS PRN
Status: DISCONTINUED | OUTPATIENT
Start: 2021-11-13 | End: 2021-11-16 | Stop reason: HOSPADM

## 2021-11-13 RX ORDER — POLYETHYLENE GLYCOL 3350 17 G/17G
17 POWDER, FOR SOLUTION ORAL DAILY PRN
Status: DISCONTINUED | OUTPATIENT
Start: 2021-11-13 | End: 2021-11-16 | Stop reason: HOSPADM

## 2021-11-13 RX ORDER — LATANOPROST 50 UG/ML
1 SOLUTION/ DROPS OPHTHALMIC NIGHTLY
Status: DISCONTINUED | OUTPATIENT
Start: 2021-11-13 | End: 2021-11-16 | Stop reason: HOSPADM

## 2021-11-13 RX ORDER — CLOBETASOL PROPIONATE 0.5 MG/G
OINTMENT TOPICAL 2 TIMES DAILY
Status: DISCONTINUED | OUTPATIENT
Start: 2021-11-13 | End: 2021-11-13

## 2021-11-13 RX ORDER — ASPIRIN 81 MG/1
81 TABLET, CHEWABLE ORAL DAILY
Status: DISCONTINUED | OUTPATIENT
Start: 2021-11-13 | End: 2021-11-16 | Stop reason: HOSPADM

## 2021-11-13 RX ORDER — 0.9 % SODIUM CHLORIDE 0.9 %
1000 INTRAVENOUS SOLUTION INTRAVENOUS ONCE
Status: COMPLETED | OUTPATIENT
Start: 2021-11-13 | End: 2021-11-13

## 2021-11-13 RX ORDER — ACETAMINOPHEN 325 MG/1
650 TABLET ORAL EVERY 6 HOURS PRN
Status: DISCONTINUED | OUTPATIENT
Start: 2021-11-13 | End: 2021-11-16 | Stop reason: HOSPADM

## 2021-11-13 RX ORDER — CLOBETASOL PROPIONATE 0.5 MG/G
CREAM TOPICAL 2 TIMES DAILY
Status: DISCONTINUED | OUTPATIENT
Start: 2021-11-13 | End: 2021-11-16 | Stop reason: HOSPADM

## 2021-11-13 RX ORDER — AMLODIPINE BESYLATE 5 MG/1
5 TABLET ORAL DAILY
Status: DISCONTINUED | OUTPATIENT
Start: 2021-11-13 | End: 2021-11-16 | Stop reason: HOSPADM

## 2021-11-13 RX ORDER — TIMOLOL MALEATE 6.8 MG/ML
1 SOLUTION/ DROPS OPHTHALMIC 2 TIMES DAILY
Status: DISCONTINUED | OUTPATIENT
Start: 2021-11-13 | End: 2021-11-13

## 2021-11-13 RX ORDER — DORZOLAMIDE HYDROCHLORIDE AND TIMOLOL MALEATE 20; 5 MG/ML; MG/ML
1 SOLUTION/ DROPS OPHTHALMIC 2 TIMES DAILY
Status: DISCONTINUED | OUTPATIENT
Start: 2021-11-13 | End: 2021-11-16 | Stop reason: HOSPADM

## 2021-11-13 RX ORDER — METHYLPREDNISOLONE SODIUM SUCCINATE 40 MG/ML
40 INJECTION, POWDER, LYOPHILIZED, FOR SOLUTION INTRAMUSCULAR; INTRAVENOUS DAILY
Status: DISCONTINUED | OUTPATIENT
Start: 2021-11-13 | End: 2021-11-15

## 2021-11-13 RX ORDER — DORZOLAMIDE HYDROCHLORIDE AND TIMOLOL MALEATE 20; 5 MG/ML; MG/ML
1 SOLUTION/ DROPS OPHTHALMIC 2 TIMES DAILY
COMMUNITY

## 2021-11-13 RX ORDER — BRIMONIDINE TARTRATE 2 MG/ML
1 SOLUTION/ DROPS OPHTHALMIC EVERY 8 HOURS
Status: DISCONTINUED | OUTPATIENT
Start: 2021-11-13 | End: 2021-11-13

## 2021-11-13 RX ORDER — METHOCARBAMOL 500 MG/1
1000 TABLET, FILM COATED ORAL 4 TIMES DAILY
Status: DISCONTINUED | OUTPATIENT
Start: 2021-11-13 | End: 2021-11-16 | Stop reason: HOSPADM

## 2021-11-13 RX ORDER — SODIUM CHLORIDE 9 MG/ML
25 INJECTION, SOLUTION INTRAVENOUS PRN
Status: DISCONTINUED | OUTPATIENT
Start: 2021-11-13 | End: 2021-11-16 | Stop reason: HOSPADM

## 2021-11-13 RX ORDER — ATORVASTATIN CALCIUM 10 MG/1
10 TABLET, FILM COATED ORAL DAILY
Status: DISCONTINUED | OUTPATIENT
Start: 2021-11-13 | End: 2021-11-16 | Stop reason: HOSPADM

## 2021-11-13 RX ORDER — KETOROLAC TROMETHAMINE 30 MG/ML
15 INJECTION, SOLUTION INTRAMUSCULAR; INTRAVENOUS EVERY 6 HOURS PRN
Status: DISCONTINUED | OUTPATIENT
Start: 2021-11-13 | End: 2021-11-16 | Stop reason: HOSPADM

## 2021-11-13 RX ORDER — ONDANSETRON 4 MG/1
4 TABLET, ORALLY DISINTEGRATING ORAL EVERY 8 HOURS PRN
Status: DISCONTINUED | OUTPATIENT
Start: 2021-11-13 | End: 2021-11-16 | Stop reason: HOSPADM

## 2021-11-13 RX ORDER — LISINOPRIL 10 MG/1
20 TABLET ORAL DAILY
Status: DISCONTINUED | OUTPATIENT
Start: 2021-11-13 | End: 2021-11-16 | Stop reason: HOSPADM

## 2021-11-13 RX ORDER — MORPHINE SULFATE 2 MG/ML
2 INJECTION, SOLUTION INTRAMUSCULAR; INTRAVENOUS ONCE
Status: COMPLETED | OUTPATIENT
Start: 2021-11-13 | End: 2021-11-13

## 2021-11-13 RX ORDER — SODIUM CHLORIDE 0.9 % (FLUSH) 0.9 %
5-40 SYRINGE (ML) INJECTION PRN
Status: DISCONTINUED | OUTPATIENT
Start: 2021-11-13 | End: 2021-11-16 | Stop reason: HOSPADM

## 2021-11-13 RX ORDER — PANTOPRAZOLE SODIUM 40 MG/1
40 TABLET, DELAYED RELEASE ORAL
Status: DISCONTINUED | OUTPATIENT
Start: 2021-11-14 | End: 2021-11-16 | Stop reason: HOSPADM

## 2021-11-13 RX ADMIN — AMLODIPINE BESYLATE 5 MG: 5 TABLET ORAL at 22:30

## 2021-11-13 RX ADMIN — SODIUM CHLORIDE 1000 ML: 9 INJECTION, SOLUTION INTRAVENOUS at 09:43

## 2021-11-13 RX ADMIN — LISINOPRIL 20 MG: 10 TABLET ORAL at 22:30

## 2021-11-13 RX ADMIN — METHYLPREDNISOLONE SODIUM SUCCINATE 40 MG: 40 INJECTION, POWDER, FOR SOLUTION INTRAMUSCULAR; INTRAVENOUS at 22:32

## 2021-11-13 RX ADMIN — ENOXAPARIN SODIUM 40 MG: 100 INJECTION SUBCUTANEOUS at 22:32

## 2021-11-13 RX ADMIN — ASPIRIN 81 MG: 81 TABLET, CHEWABLE ORAL at 22:30

## 2021-11-13 RX ADMIN — MORPHINE SULFATE 2 MG: 2 INJECTION, SOLUTION INTRAMUSCULAR; INTRAVENOUS at 11:23

## 2021-11-13 RX ADMIN — CEFTRIAXONE 1000 MG: 1 INJECTION, POWDER, FOR SOLUTION INTRAMUSCULAR; INTRAVENOUS at 11:24

## 2021-11-13 RX ADMIN — METHOCARBAMOL 1000 MG: 500 TABLET ORAL at 22:31

## 2021-11-13 RX ADMIN — ATORVASTATIN CALCIUM 10 MG: 10 TABLET, FILM COATED ORAL at 22:30

## 2021-11-13 ASSESSMENT — PAIN DESCRIPTION - ORIENTATION
ORIENTATION: LEFT
ORIENTATION: LEFT;LOWER

## 2021-11-13 ASSESSMENT — PAIN SCALES - GENERAL
PAINLEVEL_OUTOF10: 4
PAINLEVEL_OUTOF10: 9
PAINLEVEL_OUTOF10: 9
PAINLEVEL_OUTOF10: 0

## 2021-11-13 ASSESSMENT — PAIN DESCRIPTION - LOCATION
LOCATION: HIP
LOCATION: HIP

## 2021-11-13 ASSESSMENT — PAIN DESCRIPTION - PAIN TYPE: TYPE: ACUTE PAIN

## 2021-11-13 NOTE — H&P
Hospital Medicine History & Physical      PCP: Antonio Castillo MD    Date of Admission: 11/13/2021    Date of Service: Pt seen/examined on 11/13/2021 and Placed in Observation. Chief Complaint:  Left lower back and hip pain      History Of Present Illness: The patient is a 80 y.o. female with hx HTN, HLD who presents to [facility] with left lower back and hip pain. Patient states that she has had gradually worsening left lower back and hip pain over the past several days. She had a hip replacement several years ago. She describes the pain as sharp, 9/10 in severity, with rest making the pain better and weight bearing making the pain worse. She is struggling to ambulate due to the pain. She lives by herself at home, so she was worried and decided to come to the hospital for further evaluation. Denies fever, chills, chest pain, shortness of breath, abdominal pain, nausea, vomiting, constipation, diarrhea, and dysuria. In the ED, labs were significant for a sodium of 135, chloride of 98, glucose of 106. U/A showed evidence of UTI. CXR showed no acute process. CT Left Hip without contrast showed no acute abnormality, and no fracture or findings of prosthetic loosening with an incidental 3.1 cm distal AAA. CT Lumbar Spine without contrast showed no acute lumbar spine abnormality with severe multilevel degenerative changes without significant canal stenosis.     Past Medical History:        Diagnosis Date    Arthritis     Endometrial thickening on ultrasound     Glaucoma     Hyperlipidemia     Hypertension     Pneumonia     Post-menopausal bleeding     TIA (transient ischemic attack)        Past Surgical History:        Procedure Laterality Date    BREAST SURGERY      left lumpectomy    HIP ARTHROPLASTY Left 08/26/2016    hemiarthroplasty left hip    THYROIDECTOMY, PARTIAL      VARICOSE VEIN SURGERY         Medications Prior to Admission:    Prior to Admission medications    Medication Sig Start Date End Date Taking? Authorizing Provider   predniSONE (DELTASONE) 10 MG tablet take 3 tabs PO BID x2 days, then 2 tabs PO BID x2 days, then 1 tab PO BID x2 days, then 1 tab PO daily x2 days 4/21/21   Ilsa Arteaga MD   clobetasol (TEMOVATE) 0.05 % ointment Apply topically 2 times daily. 2/4/21   Jo Ann Milligan MD   triamcinolone (KENALOG) 0.1 % cream Apply topically 2 times daily Apply topically 2 times daily. Imelda Hameed MD   brimonidine (ALPHAGAN P) 0.1 % SOLN 1 drop every 8 hours    Historical Provider, MD   brimonidine (ALPHAGAN) 0.2 % ophthalmic solution 1 drop 3 times daily    Historical Provider, MD   LUMIGAN 0.01 % SOLN ophthalmic drops Place 1 drop into both eyes every evening 11/4/19   Historical Provider, MD   Timolol (TIMOPTIC) 0.5 % (DAILY) SOLN ophthalmic solution Place 1 drop into both eyes 2 times daily    Historical Provider, MD   RHOPRESSA 0.02 % SOLN Place 1 drop into both eyes 2 times daily 1/2/20   Historical Provider, MD   benazepril (LOTENSIN) 20 MG tablet Take 20 mg by mouth daily 11/7/19   Historical Provider, MD   amLODIPine (NORVASC) 5 MG tablet Take 1 tablet by mouth daily 8/28/16   Rebeca Schumacher MD   levothyroxine (SYNTHROID) 75 MCG tablet Take 75 mcg by mouth Daily    Historical Provider, MD   aspirin 81 MG tablet Take 81 mg by mouth daily. Historical Provider, MD   lovastatin (MEVACOR) 40 MG tablet Take 40 mg by mouth nightly. Historical Provider, MD   omeprazole (PRILOSEC) 20 MG capsule Take 20 mg by mouth daily. Historical Provider, MD       Allergies:  Sulfa antibiotics    Social History:  The patient currently lives at home. TOBACCO:   reports that she has never smoked. She has never used smokeless tobacco.  ETOH:   reports current alcohol use.       Family History:  Reviewed in detail and negative for DM, Early CAD, Cancer, CVA. Positive as follows:    History reviewed. No pertinent family history. REVIEW OF SYSTEMS:   Positive for and as noted in the HPI. All other systems reviewed and negative. PHYSICAL EXAM:    BP (!) 152/78   Pulse 87   Temp 98.3 °F (36.8 °C)   Resp 15   Ht 5' 6\" (1.676 m)   Wt 120 lb (54.4 kg)   SpO2 99%   BMI 19.37 kg/m²     General appearance: No apparent distress appears stated age and cooperative. HEENT Normal cephalic, atraumatic without obvious deformity. Pupils equal, round, and reactive to light. Extra ocular muscles intact. Conjunctivae/corneas clear. Neck: Supple, No jugular venous distention/bruits. Trachea midline without thyromegaly or adenopathy with full range of motion. Lungs: Clear to auscultation, bilaterally without Rales/Wheezes/Rhonchi with good respiratory effort. Heart: Regular rate and rhythm with Normal S1/S2 without murmurs, rubs or gallops, point of maximum impulse non-displaced  Abdomen: Soft, non-tender or non-distended without rigidity or guarding and positive bowel sounds all four quadrants. Extremities: TTP along left paraspinal muscles and left hip. No clubbing, cyanosis, or edema bilaterally. Full range of motion without deformity and normal gait intact. Skin: Skin color, texture, turgor normal.  No rashes or lesions. Neurologic: Alert and oriented X 3, neurovascularly intact with sensory/motor intact upper extremities/lower extremities, bilaterally. Cranial nerves: II-XII intact, grossly non-focal.  Mental status: Alert, oriented, thought content appropriate. Capillary Refill: Acceptable  < 3 seconds  Peripheral Pulses: +3 Easily felt, not easily obliterated with pressure      CXR:  I have reviewed the CXR with the following interpretation: no acute abnormality  EKG:  I have reviewed the EKG with the following interpretation: sinus rhythm with frequent PVCs    CT HIP LEFT WO CONTRAST   Final Result   1. No acute abnormality.   No fracture or findings of prosthetic loosening. No findings to account for the patient's left hip pain   2. 3.1 cm distal abdominal aortic aneurysm. If clinically indicated,   consider nonemergent CT of the abdomen to accurately measure the entire aorta         CT LUMBAR SPINE WO CONTRAST   Final Result   No acute lumbar spine abnormality      Severe multilevel degenerative changes without significant canal stenosis         XR CHEST PORTABLE   Final Result   No acute process. CBC   Recent Labs     11/13/21  0934   WBC 8.6   HGB 12.8   HCT 39.4         RENAL  Recent Labs     11/13/21  0934   *   K 3.8   CL 98*   CO2 23   BUN 12   CREATININE 0.5*     LFT'S  No results for input(s): AST, ALT, ALB, BILIDIR, BILITOT, ALKPHOS in the last 72 hours. COAG  No results for input(s): INR in the last 72 hours. CARDIAC ENZYMES  No results for input(s): CKTOTAL, CKMB, CKMBINDEX, TROPONINI in the last 72 hours. U/A:    Lab Results   Component Value Date    COLORU YELLOW 11/13/2021    WBCUA 36 11/13/2021    RBCUA 46 11/13/2021    BACTERIA 4+ 11/13/2021    CLARITYU TURBID 11/13/2021    SPECGRAV 1.013 11/13/2021    LEUKOCYTESUR MODERATE 11/13/2021    BLOODU LARGE 11/13/2021    GLUCOSEU Negative 11/13/2021       ABG  No results found for: QFJ5GGX, BEART, R9SPSSST, PHART, THGBART, THR8XZH, PO2ART, SFW3ISG        Active Hospital Problems    Diagnosis Date Noted    Low back pain [M54.50] 11/13/2021         PHYSICIANS CERTIFICATION:    I certify that Bayron Bajwa is expected to be hospitalized for less than 2 midnights based on the following assessment and plan:      ASSESSMENT/PLAN:      Low back pain, left hip pain - suspect 2/2 musculoskeletal or degenerative changes. Outpatient orthopedic surgery notes reviewed.  No acute abnormalities on CT.  -trial IV Solumedrol  -Robaxin  -IV Toradol  -orthopedic surgery consulted  -will defer any additional imaging or interventions to ortho  -PT/OT eval    Debility  -management as above    UTI  -continue empiric IV Rocephin  -follow-up urine culture    Abnormal EKG  -tele monitoring  -continue to monitor    Essential hypertension  -continue home meds    Hyperlipidemia  -continue home meds    Hypothyroidism  -continue home meds    GERD  -continue home meds      DVT Prophylaxis: Lovenox  Diet: Regular Diet  Code Status: DNR-CCA  PT/OT Eval Status: ordered    Dispo - admit med/surg tele obs       Romain Juárez MD    Thank you Jazmyne Marquez MD for the opportunity to be involved in this patient's care. If you have any questions or concerns please feel free to contact me at [phone].

## 2021-11-13 NOTE — ED PROVIDER NOTES
negatives as per HPI. PAST MEDICAL HISTORY     Past Medical History:   Diagnosis Date    Arthritis     Endometrial thickening on ultrasound     Glaucoma     Hyperlipidemia     Hypertension     Pneumonia     Post-menopausal bleeding     TIA (transient ischemic attack)        SURGICAL HISTORY     Past Surgical History:   Procedure Laterality Date    BREAST SURGERY      left lumpectomy    HIP ARTHROPLASTY Left 08/26/2016    hemiarthroplasty left hip    THYROIDECTOMY, PARTIAL      VARICOSE VEIN SURGERY         CURRENTMEDICATIONS       Previous Medications    AMLODIPINE (NORVASC) 5 MG TABLET    Take 1 tablet by mouth daily    ASPIRIN 81 MG TABLET    Take 81 mg by mouth daily. BENAZEPRIL (LOTENSIN) 20 MG TABLET    Take 20 mg by mouth daily    BRIMONIDINE (ALPHAGAN P) 0.1 % SOLN    1 drop every 8 hours    BRIMONIDINE (ALPHAGAN) 0.2 % OPHTHALMIC SOLUTION    1 drop 3 times daily    CLOBETASOL (TEMOVATE) 0.05 % OINTMENT    Apply topically 2 times daily. LEVOTHYROXINE (SYNTHROID) 75 MCG TABLET    Take 75 mcg by mouth Daily    LOVASTATIN (MEVACOR) 40 MG TABLET    Take 40 mg by mouth nightly. LUMIGAN 0.01 % SOLN OPHTHALMIC DROPS    Place 1 drop into both eyes every evening    OMEPRAZOLE (PRILOSEC) 20 MG CAPSULE    Take 20 mg by mouth daily. PREDNISONE (DELTASONE) 10 MG TABLET    take 3 tabs PO BID x2 days, then 2 tabs PO BID x2 days, then 1 tab PO BID x2 days, then 1 tab PO daily x2 days    RHOPRESSA 0.02 % SOLN    Place 1 drop into both eyes 2 times daily    TIMOLOL (TIMOPTIC) 0.5 % (DAILY) SOLN OPHTHALMIC SOLUTION    Place 1 drop into both eyes 2 times daily    TRIAMCINOLONE (KENALOG) 0.1 % CREAM    Apply topically 2 times daily Apply topically 2 times daily. ALLERGIES     Sulfa antibiotics    FAMILYHISTORY     History reviewed. No pertinent family history.      SOCIAL HISTORY       Social History     Tobacco Use    Smoking status: Never Smoker    Smokeless tobacco: Never Used   Vaping Use  Vaping Use: Never used   Substance Use Topics    Alcohol use: Yes     Comment: occ    Drug use: No       SCREENINGS           PHYSICAL EXAM    (up to 7 for level 4, 8 or more for level 5)     ED Triage Vitals [11/13/21 0732]   BP Temp Temp src Pulse Resp SpO2 Height Weight   (!) 152/78 98.3 °F (36.8 °C) -- 87 15 99 % 5' 6\" (1.676 m) 120 lb (54.4 kg)       Physical Exam  Vitals and nursing note reviewed. Constitutional:       General: She is not in acute distress. Appearance: Normal appearance. She is not ill-appearing. HENT:      Head: Normocephalic and atraumatic. Nose: Nose normal.   Eyes:      General:         Right eye: No discharge. Left eye: No discharge. Cardiovascular:      Rate and Rhythm: Normal rate and regular rhythm. Heart sounds: Normal heart sounds. No murmur heard. No gallop. Pulmonary:      Effort: Pulmonary effort is normal. No respiratory distress. Breath sounds: Normal breath sounds. No stridor. No wheezing, rhonchi or rales. Musculoskeletal:         General: Normal range of motion. Cervical back: Normal range of motion. Comments: No significant tenderness to palpation throughout the left hip area or pain reported with range of motion exercises to the left hip. Mild tenderness to palpation reported diffusely over the lumbar spine. 2+ dorsalis pedis pulse on the left. Sensation to light touch grossly intact and capillary refill <3 seconds in the digits of the left lower extremity. Skin:     General: Skin is warm and dry. Neurological:      General: No focal deficit present. Mental Status: She is alert and oriented to person, place, and time.    Psychiatric:         Mood and Affect: Mood normal.         Behavior: Behavior normal.         DIAGNOSTIC RESULTS   LABS:    Labs Reviewed   BASIC METABOLIC PANEL W/ REFLEX TO MG FOR LOW K - Abnormal; Notable for the following components:       Result Value    Sodium 135 (*)     Chloride 98 (*) Glucose 106 (*)     CREATININE 0.5 (*)     All other components within normal limits    Narrative:     Performed at:  [facility] [address] [place], [place] [address]   Phone [phone]   URINE RT REFLEX TO CULTURE - Abnormal; Notable for the following components:    Clarity, UA TURBID (*)     Ketones, Urine TRACE (*)     Blood, Urine LARGE (*)     Nitrite, Urine POSITIVE (*)     Leukocyte Esterase, Urine MODERATE (*)     All other components within normal limits    Narrative:     Performed at:  [facility] [address] [place], [place] [address]   Phone [phone]   MICROSCOPIC URINALYSIS - Abnormal; Notable for the following components:    Bacteria, UA 4+ (*)     WBC, UA 36 (*)     RBC, UA 46 (*)     Epithelial Cells, UA 7 (*)     All other components within normal limits    Narrative:     Performed at:  [address] [address]   Phone [phone]   CULTURE, URINE   CBC WITH AUTO DIFFERENTIAL    Narrative:     Performed at:  [address] [address]   Phone [phone]       When ordered only abnormal lab results are displayed. All other labs were within normal range or not returned as of this dictation. EKG: When ordered, EKG's are interpreted by the Emergency Department Physician in the absence of a cardiologist.  Please see their note for interpretation of EKG. RADIOLOGY:   Non-plain film images such as CT, Ultrasound and MRI are read by the radiologist. Plain radiographic images are visualized and preliminarily interpreted by the ED Provider with the below findings:    Interpretation per the Radiologist below, if available at the time of this note:    CT HIP LEFT WO CONTRAST   Final Result   1. No acute abnormality. No fracture or findings of prosthetic loosening.    No findings to account for the patient's left hip pain   2. 3.1 cm distal abdominal aortic aneurysm. If clinically indicated,   consider nonemergent CT of the abdomen to accurately measure the entire aorta         CT LUMBAR SPINE WO CONTRAST   Final Result   No acute lumbar spine abnormality      Severe multilevel degenerative changes without significant canal stenosis         XR CHEST PORTABLE   Final Result   No acute process. CONSULTS:  None    PROCEDURES   Unless otherwise noted below, none. Procedures    EMERGENCY DEPARTMENT COURSE and DIFFERENTIAL DIAGNOSIS/MDM:   Vitals:    Vitals:    11/13/21 0732   BP: (!) 152/78   Pulse: 87   Resp: 15   Temp: 98.3 °F (36.8 °C)   SpO2: 99%   Weight: 120 lb (54.4 kg)   Height: 5' 6\" (1.676 m)       Patient was given the following medications:  Medications   cefTRIAXone (ROCEPHIN) 1000 mg IVPB in 50 mL D5W minibag (1,000 mg IntraVENous New Bag 11/13/21 1124)   0.9 % sodium chloride bolus (0 mLs IntraVENous Stopped 11/13/21 1104)   morphine (PF) injection 2 mg (2 mg IntraVENous Given 11/13/21 1123)           Patient has normal physical exam, but is unable to stand and walk due to pain. CT scans of the hip and lumbar spine without contrast showed no acute findings but an incidental AAA. Lab work-up revealed a UTI. EKG showed some frequent PVCs and bigeminy. Patient was given IV fluids and antibiotics in the ED. She would benefit from hospitalization for further care. I consulted the hospitalist, who agreed to accept and admit the patient. CRITICAL CARE TIME   CRITICAL CARE: There was a high probability of clinically significant/life threatening deterioration in this patient's condition which required my urgent intervention. Total critical care time was 10 minutes. This excludes any time for separately reportable procedures. FINAL IMPRESSION      1. Urinary tract infection with hematuria, site unspecified    2. Abnormal EKG    3. Left hip pain    4.  Unable to ambulate          DISPOSITION/PLAN   DISPOSITION Decision To Admit 11/13/2021 11:45:29 AM      PATIENT REFERRED TO:  No follow-up provider specified.     DISCHARGE MEDICATIONS:  New Prescriptions    No medications on file       DISCONTINUED MEDICATIONS:  Discontinued Medications    No medications on file            (Please note that portions of this note were completed with a voice recognition program.  Efforts were made to edit the dictations but occasionally words are mis-transcribed.)    ROCKY Patricio (electronically signed)        Faye Patricio  11/13/21 1155

## 2021-11-13 NOTE — PROGRESS NOTES
Pt arrived to room, alert and orientedx4. Daughter at beside. Pt oriented to room, call light, diet menu. 4 Eyes Skin Assessment     NAME:  Ildefonso Hurley  YOB: 1929  MEDICAL RECORD NUMBER:  [id number]    The patient is being assess for  Admission    I agree that 2 RN's have performed a thorough Head to Toe Skin Assessment on the patient. ALL assessment sites listed below have been assessed. Areas assessed by both nurses:    Head, Face, Ears, Shoulders, Back, Chest, Arms, Elbows, Hands, Sacrum. Buttock, Coccyx, Ischium and Legs. Feet and Heels        Does the Patient have a Wound?  No noted wound(s)       Neal Prevention initiated:  Yes   Wound Care Orders initiated:  No    Pressure Injury (Stage 3,4, Unstageable, DTI, NWPT, and Complex wounds) if present place consult order under [de-identified] No    New and Established Ostomies if present place consult order under : No      Nurse 1 eSignature: Electronically signed by Ginna Rodriguez RN on 11/13/21 at 6:12 PM EST    **SHARE this note so that the co-signing nurse is able to place an eSignature**    Nurse 2 eSignature: Electronically signed by Nena Funes RN on 11/13/21 at 6:14 PM EST

## 2021-11-13 NOTE — ED NOTES
Report called to floor rn. Questions answered.  Pt stable for transport     Aldo Weston RN  11/13/21 6970

## 2021-11-14 PROBLEM — Z96.642 HISTORY OF LEFT HIP HEMIARTHROPLASTY: Status: ACTIVE | Noted: 2021-11-14

## 2021-11-14 PROBLEM — M41.57 SCOLIOSIS OF LUMBOSACRAL REGION DUE TO DEGENERATIVE DISEASE OF SPINE IN ADULT: Status: ACTIVE | Noted: 2021-11-14

## 2021-11-14 LAB
A/G RATIO: 1.2 (ref 1.1–2.2)
ALBUMIN SERPL-MCNC: 3.4 G/DL (ref 3.4–5)
ALP BLD-CCNC: 88 U/L (ref 40–129)
ALT SERPL-CCNC: <5 U/L (ref 10–40)
ANION GAP SERPL CALCULATED.3IONS-SCNC: 14 MMOL/L (ref 3–16)
AST SERPL-CCNC: 10 U/L (ref 15–37)
BASOPHILS ABSOLUTE: 0 K/UL (ref 0–0.2)
BASOPHILS RELATIVE PERCENT: 0.1 %
BILIRUB SERPL-MCNC: 0.3 MG/DL (ref 0–1)
BUN BLDV-MCNC: 14 MG/DL (ref 7–20)
CALCIUM SERPL-MCNC: 9 MG/DL (ref 8.3–10.6)
CHLORIDE BLD-SCNC: 101 MMOL/L (ref 99–110)
CO2: 22 MMOL/L (ref 21–32)
CREAT SERPL-MCNC: 0.7 MG/DL (ref 0.6–1.2)
EKG ATRIAL RATE: 97 BPM
EKG DIAGNOSIS: NORMAL
EKG P AXIS: 51 DEGREES
EKG P-R INTERVAL: 150 MS
EKG Q-T INTERVAL: 390 MS
EKG QRS DURATION: 90 MS
EKG QTC CALCULATION (BAZETT): 495 MS
EKG R AXIS: -36 DEGREES
EKG T AXIS: 45 DEGREES
EKG VENTRICULAR RATE: 97 BPM
EOSINOPHILS ABSOLUTE: 0 K/UL (ref 0–0.6)
EOSINOPHILS RELATIVE PERCENT: 0 %
GFR AFRICAN AMERICAN: >60
GFR NON-AFRICAN AMERICAN: >60
GLUCOSE BLD-MCNC: 137 MG/DL (ref 70–99)
HCT VFR BLD CALC: 37.6 % (ref 36–48)
HEMOGLOBIN: 12.3 G/DL (ref 12–16)
LYMPHOCYTES ABSOLUTE: 0.9 K/UL (ref 1–5.1)
LYMPHOCYTES RELATIVE PERCENT: 14.2 %
MCH RBC QN AUTO: 30 PG (ref 26–34)
MCHC RBC AUTO-ENTMCNC: 32.8 G/DL (ref 31–36)
MCV RBC AUTO: 91.5 FL (ref 80–100)
MONOCYTES ABSOLUTE: 0.1 K/UL (ref 0–1.3)
MONOCYTES RELATIVE PERCENT: 2.3 %
NEUTROPHILS ABSOLUTE: 5 K/UL (ref 1.7–7.7)
NEUTROPHILS RELATIVE PERCENT: 83.4 %
PDW BLD-RTO: 15 % (ref 12.4–15.4)
PLATELET # BLD: 255 K/UL (ref 135–450)
PMV BLD AUTO: 7.3 FL (ref 5–10.5)
POTASSIUM REFLEX MAGNESIUM: 4 MMOL/L (ref 3.5–5.1)
RBC # BLD: 4.11 M/UL (ref 4–5.2)
SODIUM BLD-SCNC: 137 MMOL/L (ref 136–145)
TOTAL PROTEIN: 6.2 G/DL (ref 6.4–8.2)
WBC # BLD: 6.1 K/UL (ref 4–11)

## 2021-11-14 PROCEDURE — 85025 COMPLETE CBC W/AUTO DIFF WBC: CPT

## 2021-11-14 PROCEDURE — 36415 COLL VENOUS BLD VENIPUNCTURE: CPT

## 2021-11-14 PROCEDURE — 96366 THER/PROPH/DIAG IV INF ADDON: CPT

## 2021-11-14 PROCEDURE — 97530 THERAPEUTIC ACTIVITIES: CPT

## 2021-11-14 PROCEDURE — 80053 COMPREHEN METABOLIC PANEL: CPT

## 2021-11-14 PROCEDURE — 6360000002 HC RX W HCPCS: Performed by: INTERNAL MEDICINE

## 2021-11-14 PROCEDURE — 94760 N-INVAS EAR/PLS OXIMETRY 1: CPT

## 2021-11-14 PROCEDURE — 2580000003 HC RX 258: Performed by: INTERNAL MEDICINE

## 2021-11-14 PROCEDURE — 6370000000 HC RX 637 (ALT 250 FOR IP): Performed by: INTERNAL MEDICINE

## 2021-11-14 PROCEDURE — G0378 HOSPITAL OBSERVATION PER HR: HCPCS

## 2021-11-14 PROCEDURE — 99222 1ST HOSP IP/OBS MODERATE 55: CPT | Performed by: ORTHOPAEDIC SURGERY

## 2021-11-14 PROCEDURE — 97162 PT EVAL MOD COMPLEX 30 MIN: CPT

## 2021-11-14 PROCEDURE — 96376 TX/PRO/DX INJ SAME DRUG ADON: CPT

## 2021-11-14 PROCEDURE — 93010 ELECTROCARDIOGRAM REPORT: CPT | Performed by: INTERNAL MEDICINE

## 2021-11-14 PROCEDURE — 96372 THER/PROPH/DIAG INJ SC/IM: CPT

## 2021-11-14 RX ADMIN — LEVOTHYROXINE SODIUM 75 MCG: 0.07 TABLET ORAL at 06:47

## 2021-11-14 RX ADMIN — ENOXAPARIN SODIUM 40 MG: 100 INJECTION SUBCUTANEOUS at 22:37

## 2021-11-14 RX ADMIN — METHOCARBAMOL 1000 MG: 500 TABLET ORAL at 08:11

## 2021-11-14 RX ADMIN — LISINOPRIL 20 MG: 10 TABLET ORAL at 08:10

## 2021-11-14 RX ADMIN — SODIUM CHLORIDE, PRESERVATIVE FREE 10 ML: 5 INJECTION INTRAVENOUS at 22:38

## 2021-11-14 RX ADMIN — METHOCARBAMOL 1000 MG: 500 TABLET ORAL at 12:14

## 2021-11-14 RX ADMIN — METHOCARBAMOL 1000 MG: 500 TABLET ORAL at 16:39

## 2021-11-14 RX ADMIN — LATANOPROST 1 DROP: 50 SOLUTION OPHTHALMIC at 22:38

## 2021-11-14 RX ADMIN — METHYLPREDNISOLONE SODIUM SUCCINATE 40 MG: 40 INJECTION, POWDER, FOR SOLUTION INTRAMUSCULAR; INTRAVENOUS at 08:17

## 2021-11-14 RX ADMIN — CLOBETASOL PROPIONATE: 0.5 CREAM TOPICAL at 22:38

## 2021-11-14 RX ADMIN — DORZOLAMIDE HYDROCHLORIDE AND TIMOLOL MALEATE 1 DROP: 20; 5 SOLUTION/ DROPS OPHTHALMIC at 09:28

## 2021-11-14 RX ADMIN — PANTOPRAZOLE SODIUM 40 MG: 40 TABLET, DELAYED RELEASE ORAL at 06:47

## 2021-11-14 RX ADMIN — METHOCARBAMOL 1000 MG: 500 TABLET ORAL at 22:37

## 2021-11-14 RX ADMIN — DORZOLAMIDE HYDROCHLORIDE AND TIMOLOL MALEATE 1 DROP: 20; 5 SOLUTION/ DROPS OPHTHALMIC at 22:37

## 2021-11-14 RX ADMIN — ATORVASTATIN CALCIUM 10 MG: 10 TABLET, FILM COATED ORAL at 08:11

## 2021-11-14 RX ADMIN — CEFTRIAXONE 1000 MG: 1 INJECTION, POWDER, FOR SOLUTION INTRAMUSCULAR; INTRAVENOUS at 10:42

## 2021-11-14 RX ADMIN — AMLODIPINE BESYLATE 5 MG: 5 TABLET ORAL at 08:11

## 2021-11-14 RX ADMIN — SODIUM CHLORIDE, PRESERVATIVE FREE 10 ML: 5 INJECTION INTRAVENOUS at 08:23

## 2021-11-14 RX ADMIN — ASPIRIN 81 MG: 81 TABLET, CHEWABLE ORAL at 08:11

## 2021-11-14 ASSESSMENT — PAIN SCALES - GENERAL
PAINLEVEL_OUTOF10: 0

## 2021-11-14 NOTE — PROGRESS NOTES
Hospitalist Progress Note      PCP: Kong Pérez MD    Date of Admission: 11/13/2021    Chief Complaint: left lower back and hip pain    Hospital Course: The patient is a 80 y.o. female with hx HTN, HLD who presents to [facility] with left lower back and hip pain. Patient states that she has had gradually worsening left lower back and hip pain over the past several days. She had a hip replacement several years ago. She describes the pain as sharp, 9/10 in severity, with rest making the pain better and weight bearing making the pain worse. She is struggling to ambulate due to the pain. She lives by herself at home, so she was worried and decided to come to the hospital for further evaluation. Denies fever, chills, chest pain, shortness of breath, abdominal pain, nausea, vomiting, constipation, diarrhea, and dysuria.     In the ED, labs were significant for a sodium of 135, chloride of 98, glucose of 106. U/A showed evidence of UTI. CXR showed no acute process. CT Left Hip without contrast showed no acute abnormality, and no fracture or findings of prosthetic loosening with an incidental 3.1 cm distal AAA. CT Lumbar Spine without contrast showed no acute lumbar spine abnormality with severe multilevel degenerative changes without significant canal stenosis. Subjective: Pt seen and examined. Feels much better today. Back pain is significantly improved.        Medications:  Reviewed    Infusion Medications    sodium chloride       Scheduled Medications    amLODIPine  5 mg Oral Daily    aspirin  81 mg Oral Daily    lisinopril  20 mg Oral Daily    levothyroxine  75 mcg Oral Daily    atorvastatin  10 mg Oral Daily    latanoprost  1 drop Both Eyes Nightly    pantoprazole  40 mg Oral QAM AC    sodium chloride flush  5-40 mL IntraVENous 2 times per day    enoxaparin  40 mg SubCUTAneous Nightly    cefTRIAXone (ROCEPHIN) IV  1,000 mg IntraVENous Q24H    methylPREDNISolone  40 mg IntraVENous 0.5* 0.7   CALCIUM 9.0 9.0     Recent Labs     11/14/21  0459   AST 10*   ALT <5*   BILITOT 0.3   ALKPHOS 88     No results for input(s): INR in the last 72 hours. No results for input(s): Assunta Spears in the last 72 hours. Urinalysis:      Lab Results   Component Value Date    NITRU POSITIVE 11/13/2021    WBCUA 36 11/13/2021    BACTERIA 4+ 11/13/2021    RBCUA 46 11/13/2021    BLOODU LARGE 11/13/2021    SPECGRAV 1.013 11/13/2021    GLUCOSEU Negative 11/13/2021       Radiology:  CT HIP LEFT WO CONTRAST   Final Result   1. No acute abnormality. No fracture or findings of prosthetic loosening. No findings to account for the patient's left hip pain   2. 3.1 cm distal abdominal aortic aneurysm. If clinically indicated,   consider nonemergent CT of the abdomen to accurately measure the entire aorta         CT LUMBAR SPINE WO CONTRAST   Final Result   No acute lumbar spine abnormality      Severe multilevel degenerative changes without significant canal stenosis         XR CHEST PORTABLE   Final Result   No acute process. Assessment/Plan:    Active Hospital Problems    Diagnosis Date Noted    Urinary tract infection with hematuria [N39.0, R31.9] 07/12/2015     Priority: High    Scoliosis of lumbosacral region due to degenerative disease of spine in adult [M41.87] 11/14/2021    History of left hip hemiarthroplasty [Z96.642] 11/14/2021    Low back pain [M54.50] 11/13/2021       Low back pain, left hip pain - suspect 2/2 musculoskeletal or degenerative changes. Outpatient orthopedic surgery notes reviewed.  No acute abnormalities on CT.  -trial IV Solumedrol  -Robaxin  -IV Toradol  -orthopedic surgery consulted  -will defer any additional imaging or interventions to ortho  -PT/OT eval     Debility  -management as above  -ARU eval     UTI  -continue empiric IV Rocephin  -follow-up urine culture     Abnormal EKG  -tele monitoring  -continue to monitor     Essential hypertension  -continue home meds     Hyperlipidemia  -continue home meds     Hypothyroidism  -continue home meds     GERD  -continue home meds      DVT Prophylaxis: Lovenox  Diet: ADULT DIET;  Regular  Code Status: DNR-CCA    PT/OT Eval Status: ordered    Dispo - continue care, await ARU eval and rehab disposition    Audley Lombard, MD

## 2021-11-14 NOTE — CONSULTS
ORTHOPAEDIC CONSULTATION NOTE    Chief Complaint   Patient presents with    Hip Pain     left hip pain over the past week, no known injury, had increased pain this morning when she attempted to stand arrived via squad, lives at home        Reason for Consult?  severe low back and hip pain, has seen ortho in past, unclear if steroid injection would be helpful       HPI  80 y.o. female seen in consultation at the request of Maxwell Alpers MD for evaluation of low back pain:    Pain reports low back pain  She denies left hip pain   No lateral hip pain   No buttock pain   No groin pain  Onset last week  Injury/trauma none   Relevant history - left hip hemiarthroplasty for femoral neck fracture by Dr Cecilio Norwood in 2016  Denies hx of LBP  Pain is located lower back only   No radiation  Worse with pressure, activity  Better with rest  Associated with N/A  Radicular symptoms = denies  Numbness and/or tingling = denies  Lives alone    Review of Systems  Constitutional - denies fevers, weight loss  Cardiovascular - denies chest pain, palpitations, peripheral edema, blood clots  Respiratory - denies SOB, cough  Gastrointestinal - denies abdominal pain, nausea, vomiting  Genitourinary - denies dysuria, discharge  Musculoskeletal - per HPI  Integumentary - denies rash, sores, open wounds  Neurologic - denies numbness, tingling, paresthesias  Hematologic - denies abnormal bleeding, blood clots  Allergic/Immunologic - denies metal allergies, recurrent infections    Allergies   Allergen Reactions    Sulfa Antibiotics Nausea Only        Current Facility-Administered Medications   Medication Dose Route Frequency Provider Last Rate Last Admin    amLODIPine (NORVASC) tablet 5 mg  5 mg Oral Daily Ida Roldan MD   5 mg at 11/14/21 5297    aspirin chewable tablet 81 mg  81 mg Oral Daily Ida Roldan MD   81 mg at 11/14/21 0811    lisinopril (PRINIVIL;ZESTRIL) tablet 20 mg  20 mg Oral Daily Ida Roladn MD   20 mg at 11/14/21 8815    levothyroxine (SYNTHROID) tablet 75 mcg  75 mcg Oral Daily Rinku Pruitt MD   75 mcg at 11/14/21 6659    atorvastatin (LIPITOR) tablet 10 mg  10 mg Oral Daily Rinku Pruitt MD   10 mg at 11/14/21 0811    latanoprost (XALATAN) 0.005 % ophthalmic solution 1 drop  1 drop Both Eyes Nightly Rinku Pruitt MD        pantoprazole (PROTONIX) tablet 40 mg  40 mg Oral QAM AC Rinku Pruitt MD   40 mg at 11/14/21 5228    sodium chloride flush 0.9 % injection 5-40 mL  5-40 mL IntraVENous 2 times per day Rinku Pruitt MD   10 mL at 11/14/21 0823    sodium chloride flush 0.9 % injection 5-40 mL  5-40 mL IntraVENous PRN Rinku Pruitt MD        0.9 % sodium chloride infusion  25 mL IntraVENous PRN Rinku Pruitt MD        enoxaparin (LOVENOX) injection 40 mg  40 mg SubCUTAneous Nightly Rinku Pruitt MD   40 mg at 11/13/21 2232    ondansetron (ZOFRAN-ODT) disintegrating tablet 4 mg  4 mg Oral Q8H PRN Rinku Pruitt MD        Or    [facility]ron [facility] injection 4 mg  4 mg IntraVENous Q6H PRN Rinku Pruitt MD        polyethylene glycol [facility]) packet 17 g  17 g Oral Daily PRN Rinku Pruitt MD        acetaminophen (TYLENOL) tablet 650 mg  650 mg Oral Q6H PRN Rinku Pruitt MD        Or    acetaminophen (TYLENOL) suppository 650 mg  650 mg Rectal Q6H PRN Rinku Pruitt MD        cefTRIAXone (ROCEPHIN) 1000 mg IVPB in 50 mL D5W minibag  1,000 mg IntraVENous Q24H Rinku Pruitt  mL/hr at 11/14/21 1042 1,000 mg at 11/14/21 1042    methylPREDNISolone sodium (SOLU-MEDROL) injection 40 mg  40 mg IntraVENous Daily Rinku Pruitt MD   40 mg at 11/14/21 0817    methocarbamol (ROBAXIN) tablet 1,000 mg  1,000 mg Oral 4x Daily Rinku Pruitt MD   1,000 mg at 11/14/21 9106    ketorolac (TORADOL) injection 15 mg  15 mg IntraVENous Q6H PRN Rinku Pruitt MD        clobetasol (TEMOVATE) 0.05 % cream   Topical BID Rinku Pruitt MD        dorzolamide-timolol (COSOPT) 22.3-6.8 MG/ML ophthalmic solution 1 drop  1 drop Both Eyes BID Nydia Tavarez MD   1 drop at 11/14/21 0928    Netarsudil Dimesylate 0.02 % SOLN 1 drop  1 drop Ophthalmic Nightly Nydia Tavarez MD        influenza quadrivalent split vaccine (FLUZONE;FLUARIX;FLULAVAL;AFLURIA) injection 0.5 mL  0.5 mL IntraMUSCular Prior to discharge Nydia Tavarez MD           Past Medical History:   Diagnosis Date    Arthritis     Endometrial thickening on ultrasound     Glaucoma     Hyperlipidemia     Hypertension     Pneumonia     Post-menopausal bleeding     TIA (transient ischemic attack)         Past Surgical History:   Procedure Laterality Date    BREAST SURGERY      left lumpectomy    HIP ARTHROPLASTY Left 08/26/2016    hemiarthroplasty left hip    THYROIDECTOMY, PARTIAL      VARICOSE VEIN SURGERY         History reviewed. No pertinent family history. Social History     Socioeconomic History    Marital status:      Spouse name: Not on file    Number of children: Not on file    Years of education: Not on file    Highest education level: Not on file   Occupational History    Occupation: retired   Tobacco Use    Smoking status: Never Smoker    Smokeless tobacco: Never Used   Vaping Use    Vaping Use: Never used   Substance and Sexual Activity    Alcohol use: Yes     Comment: occ    Drug use: No    Sexual activity: Not Currently   Other Topics Concern    Not on file   Social History Narrative    Not on file     Social Determinants of Health     Financial Resource Strain:     Difficulty of Paying Living Expenses: Not on file   Food Insecurity:     Worried About [address] in the Last Year: Not on file    [address] in the Last Year: Not on file   Transportation Needs:     Lack of Transportation (Medical): Not on file    Lack of Transportation (Non-Medical):  Not on file   Physical Activity:     Days of Exercise per Week: Not on file    Minutes of Exercise per Session: Not on file   Stress:     Feeling of Stress : Not on file   Social Connections:     Frequency of Communication with Friends and Family: Not on file    Frequency of Social Gatherings with Friends and Family: Not on file    Attends Rastafarian Services: Not on file    Active Member of [address] [place] [address] or Organizations: Not on file    Attends Club or Organization Meetings: Not on file    Marital Status: Not on file   Intimate Partner Violence:     Fear of Current or Ex-Partner: Not on file    Emotionally Abused: Not on file    Physically Abused: Not on file    Sexually Abused: Not on file   Housing Stability:     Unable to Pay for Housing in the Last Year: Not on file    Number of Robert in the Last Year: Not on file    Unstable Housing in the Last Year: Not on file        Vitals:    11/14/21 0330 11/14/21 0733 11/14/21 0749 11/14/21 0949   BP: 136/68 120/75     Pulse: 102 105 105    Resp: 16 18     Temp: 98.3 °F (36.8 °C) 98.8 °F (37.1 °C)     TempSrc: Oral Oral     SpO2: 96% 95%  96%   Weight:       Height:           Physical Exam  Constitutional - well-groomed, well-nourished, Body mass index is 19.37 kg/m².   Psychiatric - pleasant, normal mood & affect  HEENT - normocephalic atraumatic  Cardiovascular - RRR, negative peripheral edema, dorsalis pedis pulse 2+  Respiratory - respirations unlabored, on room air; mask on  Skin - no rashes, wounds, or lesions seen on exposed skin  Spine - TTP lumbar spinous processes, TTP paraspinal musculature  Neurological - BLE SILT SP/DP/T/sural/saphenous nerve distributions; EHL/FHL/TA/GS intact  Left hip - prior lateral surgical incision well healed   No erythema or drainage   No swelling, bruising, deformity    No TTP   Neg log roll       Imaging:  Images were personally reviewed by myself  Narrative   EXAMINATION:   CT OF THE LEFT HIP WITHOUT CONTRAST 11/13/2021 9:36 am       TECHNIQUE:   CT of the left hip was performed without the administration of intravenous contrast.  Multiplanar reformatted images are provided for review. Dose   modulation, iterative reconstruction, and/or weight based adjustment of the   mA/kV was utilized to reduce the radiation dose to as low as reasonably   achievable.       COMPARISON:   None.       HISTORY   ORDERING SYSTEM PROVIDED HISTORY: pain, hx of surgery   TECHNOLOGIST PROVIDED HISTORY:   Reason for exam:->pain, hx of surgery   Decision Support Exception - unselect if not a suspected or confirmed   emergency medical condition->Emergency Medical Condition ([place])   Reason for Exam: Hip Pain (left hip pain over the past week, no known injury,   had increased pain this morning when she attempted to stand arrived via   squad, lives at home )   Acuity: Acute   Type of Exam: Initial       FINDINGS:   Bones: Status post left hip hemiarthroplasty.  Good alignment of the   prosthetic components with no dislocation.  Osteopenic bones.  No acute   fracture demonstrated (the tip of the stem of the femoral component not   included).  Fracture of the remainder of the pelvis and proximal right femur. No hematoma or abnormal fluid collection identified in the region of the left   hip and proximal femur.       Soft Tissue: No hematoma or abnormal fluid collection demonstrated in the   region of the left hip and proximal femur.  Incidental note made of left   renal calculi, colonic diverticula, and 3.1 cm aneurysm of the distal   abdominal aorta       Joint: Status post left hip arthroplasty.  No dislocation or periprosthetic   lucency to suggest loosening           Impression   1. No acute abnormality.  No fracture or findings of prosthetic loosening.    No findings to account for the patient's left hip pain   2. 3.1 cm distal abdominal aortic aneurysm.  If clinically indicated,   consider nonemergent CT of the abdomen to accurately measure the entire aorta           Narrative   EXAMINATION:   CT OF THE LUMBAR SPINE WITHOUT CONTRAST  11/13/2021     TECHNIQUE:   CT of the lumbar spine was performed without the administration of   intravenous contrast. Multiplanar reformatted images are provided for review. Adjustment of mA and/or kV according to patient size was utilized. Mag Co   modulation, iterative reconstruction, and/or weight based adjustment of the   mA/kV was utilized to reduce the radiation dose to as low as reasonably   achievable.       COMPARISON:   None       HISTORY:   ORDERING SYSTEM PROVIDED HISTORY: PAIN   TECHNOLOGIST PROVIDED HISTORY:   Reason for exam:->pain, can't walk   Decision Support Exception - unselect if not a suspected or confirmed   emergency medical condition->Emergency Medical Condition (MA)   Reason for Exam: Hip Pain (left hip pain over the past week, no known injury,   had increased pain this morning when she attempted to stand arrived via   squad, lives at home )   Acuity: Acute   Type of Exam: Initial       FINDINGS:   BONES/ALIGNMENT: Lumbar scoliosis convex left.  The vertebral body heights   are maintained.  No osseous destructive lesion is seen.       DEGENERATIVE CHANGES: Severe multilevel degenerative disc disease.  No   significant canal stenosis identified.       SOFT TISSUES/RETROPERITONEUM: No paraspinal mass is seen.           Impression   No acute lumbar spine abnormality       Severe multilevel degenerative changes without significant canal stenosis           Labs:  Lab Results   Component Value Date    WBC 6.1 11/14/2021    HGB 12.3 11/14/2021    HCT 37.6 11/14/2021    MCV 91.5 11/14/2021     11/14/2021     Lab Results   Component Value Date     11/14/2021    K 4.0 11/14/2021     11/14/2021    CO2 22 11/14/2021    BUN 14 11/14/2021    CREATININE 0.7 11/14/2021    GLUCOSE 137 (H) 11/14/2021    CALCIUM 9.0 11/14/2021    PROT 6.2 (L) 11/14/2021    LABALBU 3.4 11/14/2021    BILITOT 0.3 11/14/2021    ALKPHOS 88 11/14/2021    AST 10 (L) 11/14/2021    ALT <5 (L) 11/14/2021    LABGLOM >60 11/14/2021    GFRAA >60 11/14/2021    AGRATIO 1.2 11/14/2021    GLOB 3.1 08/10/2017           Assessment & Plan:  80 y.o. female who presents with   Active Problems:    Low back pain    Scoliosis of lumbosacral region due to degenerative disease of spine in adult    History of left hip hemiarthroplasty  Resolved Problems:    * No resolved hospital problems.  *      Patient without left hip pain  She describes low back pain only    Severe lumbar DDD, likely stenosis  Also severe lumbosacral degenerative scoliosis present    Imaging of left hip without acute process  Nothing to do in regards to left hip  LLE WBAT, activities as tolerated      Ice, tylenol/NSAIDs PRN  If spine issues persist, please consult the spine service (separate call team)    Lynda Munoz MD

## 2021-11-14 NOTE — PROGRESS NOTES
Physical Therapy    Facility/Department: [address] PROGRESSIVE CARE  Initial Assessment    NAME: Ventura Nieto  : 1929  MRN: [id number]    Date of Service: 2021    Discharge Recommendations:  Patient would benefit from continued therapy after discharge, 5-7 sessions per week     Ventura Nieto scored a 17/24 on the AM-PAC short mobility form. Current research shows that an AM-PAC score of 17 or less is typically not associated with a discharge to the patient's home setting. Based on the patient's AM-PAC score and their current functional mobility deficits, it is recommended that the patient have 5-7 sessions per week of Physical Therapy at d/c to increase the patient's independence. At this time, this patient demonstrates the endurance, and/or tolerance for 3 hours of therapy each day, with a treatment frequency of 5-7x/wk. Please see assessment section for further patient specific details. If patient discharges prior to next session this note will serve as a discharge summary. Please see below for the latest assessment towards goals. Assessment   Body structures, Functions, Activity limitations: Decreased functional mobility ; Decreased ADL status; Decreased strength; Decreased balance  Assessment: The patient is a 80 y.o. female with hx HTN, HLD who presents to [facility] on 21 with left lower back and hip pain. Prior to admission, pt living alone in house setting with bedroom/bath on 2nd level of home, using [facility] for ambulation, independent with ADLs, assist from family for homemaking. Pt currently functioning below baseline, requiring CGA for all mobility and the use of a RW for ambulation. Pt reporting family is unable to provide 24hr supv, therefore recommend continued skilled therapy 5-7x/week to maximize independence and safety with functional mobility.   Treatment Diagnosis: impaired mobility  Prognosis: Good  Decision Making: Medium Complexity  History: see below  Exam: see below  Clinical Presentation: evolving  PT Education: PT Role; Plan of Care; Functional Mobility Training; General Safety; Gait Training; Transfer Training  REQUIRES PT FOLLOW UP: Yes  Activity Tolerance  Activity Tolerance: Patient Tolerated treatment well       Patient Diagnosis(es): The primary encounter diagnosis was Urinary tract infection with hematuria, site unspecified. Diagnoses of Abnormal EKG, Left hip pain, and Unable to ambulate were also pertinent to this visit. has a past medical history of Arthritis, Endometrial thickening on ultrasound, Glaucoma, Hyperlipidemia, Hypertension, Pneumonia, Post-menopausal bleeding, and TIA (transient ischemic attack). has a past surgical history that includes Thyroidectomy, partial; Varicose vein surgery; Breast surgery; and Hip Arthroplasty (Left, 08/26/2016). Vision/Hearing  Vision: Within Functional Limits  Hearing: Within functional limits       Subjective  General  Chart Reviewed: Yes  Patient assessed for rehabilitation services?: Yes  Additional Pertinent Hx: The patient is a 80 y.o. female with hx HTN, HLD who [facility] with left lower back and hip pain. Response To Previous Treatment: Not applicable  Family / Caregiver Present: Yes (dtr and RADHA)  Referring Practitioner: Audley Lombard, MD  Referral Date : 11/13/21  Diagnosis: LBP  Follows Commands: Within Functional Limits  Subjective  Subjective: Pt is agreeable to PT - reports she has not been oob since admission. Pt reporting no pain while supine in bed, mild pain when mobilizing.   Pain Screening  Patient Currently in Pain: No          Orientation  Orientation  Overall Orientation Status: Within Functional Limits     Social/Functional History  Social/Functional History  Lives With: Alone  Type of Home: House  Home Layout: Two level, 1/2 bath on main level, Bed/Bath upstairs  Home Access: Stairs to enter without rails  Entrance Stairs - Number of Steps: 1+1  Bathroom Shower/Tub: Walk-in shower, Shower chair with back  Bathroom Toilet: Standard  Bathroom Equipment: Grab bars in [address]: Rolling walker, Cane  ADL Assistance: Independent  Homemaking Assistance: Needs assistance (children perform all)  Ambulation Assistance: Independent (with SPC)  Transfer Assistance: Independent  Active : No  Leisure & Hobbies: watch tv  Additional Comments: Denies recent falls     Cognition   Cognition  Overall Cognitive Status: WFL    Objective  Strength RLE  Strength RLE: Exception  Comment: mild generalized weakness  Strength LLE  Strength LLE: Exception  Comment: mild generalized weakness  Motor Control  Gross Motor?: WFL     Bed mobility  Supine to Sit: Contact guard assistance  Sit to Supine: Unable to assess (in recliner at end of session)  Transfers  Sit to Stand: Contact guard assistance  Stand to sit: Contact guard assistance  Ambulation  Ambulation?: Yes  Ambulation 1  Surface: level tile  Device: Rolling Walker  Assistance: Contact guard assistance  Quality of Gait: very narrow GARY  Gait Deviations: Slow Jacque; Decreased step length; Decreased step height  Distance: 15'  Comments: Pt reporting mild fatigue after 15'  Stairs/Curb  Stairs?: No     Balance  Posture: Fair  Sitting - Static: Good  Sitting - Dynamic: Good  Standing - Static: Good (@RW)  Standing - Dynamic: Fair; + (@RW)        Plan   Plan  Times per week: 3-5x/week  Current Treatment Recommendations: Strengthening, Functional Mobility Training, Transfer Training, Balance Training, Gait Training, Stair training, Neuromuscular Re-education, Patient/Caregiver Education & Training, Safety Education & Training, Equipment Evaluation, Education, & procurement  Safety Devices  Type of devices:  All fall risk precautions in place, Call light within reach, Gait belt, Patient at risk for falls, Left in chair, Chair alarm in place, Nurse notified    AM-PAC Score  AM-PAC Inpatient Mobility Raw Score : 17 (11/14/21 0932)  AM-PAC Inpatient T-Scale Score : 42.13 (11/14/21 0932)  Mobility Inpatient CMS 0-100% Score: 50.57 (11/14/21 0932)  Mobility Inpatient CMS G-Code Modifier : CK (11/14/21 0932)          Goals  Short term goals  Time Frame for Short term goals: by acute discharge  Short term goal 1: bed mobility with supv  Short term goal 2: sit<>Stand with supv  Short term goal 3: ambulate > 48' with RW and SBA  Patient Goals   Patient goals : pain relief       Therapy Time   Individual Concurrent Group Co-treatment   Time In 0900         Time Out 0930         Minutes 30         Timed Code Treatment Minutes: 15 Minutes       Soledad Hennessy PT

## 2021-11-14 NOTE — PROGRESS NOTES
Patient resting in bed, Vora Fall Risk: High (45 and higher), Pain Level: 0, vitals stable, assisted to position of comfort, call light and belongings in reach, encouraged to call with needs, will continue to monitor.

## 2021-11-15 LAB
A/G RATIO: 1.1 (ref 1.1–2.2)
ALBUMIN SERPL-MCNC: 3 G/DL (ref 3.4–5)
ALP BLD-CCNC: 76 U/L (ref 40–129)
ALT SERPL-CCNC: <5 U/L (ref 10–40)
ANION GAP SERPL CALCULATED.3IONS-SCNC: 12 MMOL/L (ref 3–16)
AST SERPL-CCNC: 11 U/L (ref 15–37)
BASOPHILS ABSOLUTE: 0 K/UL (ref 0–0.2)
BASOPHILS RELATIVE PERCENT: 0.1 %
BILIRUB SERPL-MCNC: <0.2 MG/DL (ref 0–1)
BUN BLDV-MCNC: 28 MG/DL (ref 7–20)
CALCIUM SERPL-MCNC: 8.4 MG/DL (ref 8.3–10.6)
CHLORIDE BLD-SCNC: 101 MMOL/L (ref 99–110)
CO2: 22 MMOL/L (ref 21–32)
CREAT SERPL-MCNC: 0.9 MG/DL (ref 0.6–1.2)
EOSINOPHILS ABSOLUTE: 0 K/UL (ref 0–0.6)
EOSINOPHILS RELATIVE PERCENT: 0.1 %
GFR AFRICAN AMERICAN: >60
GFR NON-AFRICAN AMERICAN: 58
GLUCOSE BLD-MCNC: 109 MG/DL (ref 70–99)
HCT VFR BLD CALC: 34.2 % (ref 36–48)
HEMOGLOBIN: 11.2 G/DL (ref 12–16)
LYMPHOCYTES ABSOLUTE: 1.3 K/UL (ref 1–5.1)
LYMPHOCYTES RELATIVE PERCENT: 15.5 %
MCH RBC QN AUTO: 29.9 PG (ref 26–34)
MCHC RBC AUTO-ENTMCNC: 32.7 G/DL (ref 31–36)
MCV RBC AUTO: 91.5 FL (ref 80–100)
MONOCYTES ABSOLUTE: 0.9 K/UL (ref 0–1.3)
MONOCYTES RELATIVE PERCENT: 10.7 %
NEUTROPHILS ABSOLUTE: 6.1 K/UL (ref 1.7–7.7)
NEUTROPHILS RELATIVE PERCENT: 73.6 %
PDW BLD-RTO: 15.1 % (ref 12.4–15.4)
PLATELET # BLD: 215 K/UL (ref 135–450)
PMV BLD AUTO: 7.5 FL (ref 5–10.5)
POTASSIUM REFLEX MAGNESIUM: 4.1 MMOL/L (ref 3.5–5.1)
RBC # BLD: 3.73 M/UL (ref 4–5.2)
SODIUM BLD-SCNC: 135 MMOL/L (ref 136–145)
TOTAL PROTEIN: 5.7 G/DL (ref 6.4–8.2)
WBC # BLD: 8.3 K/UL (ref 4–11)

## 2021-11-15 PROCEDURE — 97530 THERAPEUTIC ACTIVITIES: CPT

## 2021-11-15 PROCEDURE — 6370000000 HC RX 637 (ALT 250 FOR IP): Performed by: INTERNAL MEDICINE

## 2021-11-15 PROCEDURE — 6360000002 HC RX W HCPCS: Performed by: INTERNAL MEDICINE

## 2021-11-15 PROCEDURE — 97116 GAIT TRAINING THERAPY: CPT

## 2021-11-15 PROCEDURE — 80053 COMPREHEN METABOLIC PANEL: CPT

## 2021-11-15 PROCEDURE — 85025 COMPLETE CBC W/AUTO DIFF WBC: CPT

## 2021-11-15 PROCEDURE — 96376 TX/PRO/DX INJ SAME DRUG ADON: CPT

## 2021-11-15 PROCEDURE — 97535 SELF CARE MNGMENT TRAINING: CPT

## 2021-11-15 PROCEDURE — 2580000003 HC RX 258: Performed by: INTERNAL MEDICINE

## 2021-11-15 PROCEDURE — 94760 N-INVAS EAR/PLS OXIMETRY 1: CPT

## 2021-11-15 PROCEDURE — 36415 COLL VENOUS BLD VENIPUNCTURE: CPT

## 2021-11-15 PROCEDURE — 97166 OT EVAL MOD COMPLEX 45 MIN: CPT

## 2021-11-15 PROCEDURE — 1200000000 HC SEMI PRIVATE

## 2021-11-15 RX ORDER — HEPARIN SODIUM 5000 [USP'U]/ML
5000 INJECTION, SOLUTION INTRAVENOUS; SUBCUTANEOUS 2 TIMES DAILY
Status: DISCONTINUED | OUTPATIENT
Start: 2021-11-15 | End: 2021-11-16 | Stop reason: HOSPADM

## 2021-11-15 RX ORDER — METHOCARBAMOL 500 MG/1
1000 TABLET, FILM COATED ORAL 4 TIMES DAILY
Qty: 80 TABLET | Refills: 0 | Status: SHIPPED | OUTPATIENT
Start: 2021-11-15 | End: 2021-11-25

## 2021-11-15 RX ORDER — CEFDINIR 300 MG/1
300 CAPSULE ORAL 2 TIMES DAILY
Qty: 8 CAPSULE | Refills: 0 | Status: ON HOLD | OUTPATIENT
Start: 2021-11-15 | End: 2021-11-23 | Stop reason: HOSPADM

## 2021-11-15 RX ORDER — GREEN TEA/HOODIA GORDONII 315-12.5MG
1 CAPSULE ORAL 2 TIMES DAILY
Qty: 8 TABLET | Refills: 0 | Status: ON HOLD | OUTPATIENT
Start: 2021-11-15 | End: 2021-11-23 | Stop reason: HOSPADM

## 2021-11-15 RX ORDER — PREDNISONE 20 MG/1
40 TABLET ORAL DAILY
Status: DISCONTINUED | OUTPATIENT
Start: 2021-11-16 | End: 2021-11-16 | Stop reason: HOSPADM

## 2021-11-15 RX ORDER — PREDNISONE 10 MG/1
TABLET ORAL
Qty: 20 TABLET | Refills: 0 | Status: ON HOLD | OUTPATIENT
Start: 2021-11-15 | End: 2021-11-23 | Stop reason: HOSPADM

## 2021-11-15 RX ADMIN — SODIUM CHLORIDE, PRESERVATIVE FREE 10 ML: 5 INJECTION INTRAVENOUS at 08:01

## 2021-11-15 RX ADMIN — DORZOLAMIDE HYDROCHLORIDE AND TIMOLOL MALEATE 1 DROP: 20; 5 SOLUTION/ DROPS OPHTHALMIC at 08:04

## 2021-11-15 RX ADMIN — PANTOPRAZOLE SODIUM 40 MG: 40 TABLET, DELAYED RELEASE ORAL at 06:30

## 2021-11-15 RX ADMIN — LATANOPROST 1 DROP: 50 SOLUTION OPHTHALMIC at 20:46

## 2021-11-15 RX ADMIN — LISINOPRIL 20 MG: 10 TABLET ORAL at 07:59

## 2021-11-15 RX ADMIN — METHOCARBAMOL 1000 MG: 500 TABLET ORAL at 20:44

## 2021-11-15 RX ADMIN — HEPARIN SODIUM 5000 UNITS: 5000 INJECTION INTRAVENOUS; SUBCUTANEOUS at 20:46

## 2021-11-15 RX ADMIN — METHOCARBAMOL 1000 MG: 500 TABLET ORAL at 16:24

## 2021-11-15 RX ADMIN — DORZOLAMIDE HYDROCHLORIDE AND TIMOLOL MALEATE 1 DROP: 20; 5 SOLUTION/ DROPS OPHTHALMIC at 20:46

## 2021-11-15 RX ADMIN — ASPIRIN 81 MG: 81 TABLET, CHEWABLE ORAL at 07:59

## 2021-11-15 RX ADMIN — CLOBETASOL PROPIONATE: 0.5 CREAM TOPICAL at 20:46

## 2021-11-15 RX ADMIN — ATORVASTATIN CALCIUM 10 MG: 10 TABLET, FILM COATED ORAL at 07:59

## 2021-11-15 RX ADMIN — CEFTRIAXONE 1000 MG: 1 INJECTION, POWDER, FOR SOLUTION INTRAMUSCULAR; INTRAVENOUS at 10:17

## 2021-11-15 RX ADMIN — SODIUM CHLORIDE, PRESERVATIVE FREE 10 ML: 5 INJECTION INTRAVENOUS at 20:45

## 2021-11-15 RX ADMIN — METHOCARBAMOL 1000 MG: 500 TABLET ORAL at 12:51

## 2021-11-15 RX ADMIN — METHYLPREDNISOLONE SODIUM SUCCINATE 40 MG: 40 INJECTION, POWDER, FOR SOLUTION INTRAMUSCULAR; INTRAVENOUS at 08:00

## 2021-11-15 RX ADMIN — AMLODIPINE BESYLATE 5 MG: 5 TABLET ORAL at 07:59

## 2021-11-15 RX ADMIN — LEVOTHYROXINE SODIUM 75 MCG: 0.07 TABLET ORAL at 06:30

## 2021-11-15 RX ADMIN — METHOCARBAMOL 1000 MG: 500 TABLET ORAL at 08:00

## 2021-11-15 RX ADMIN — CLOBETASOL PROPIONATE: 0.5 CREAM TOPICAL at 08:08

## 2021-11-15 ASSESSMENT — PAIN SCALES - GENERAL: PAINLEVEL_OUTOF10: 0

## 2021-11-15 NOTE — PROGRESS NOTES
Occupational Therapy   Occupational Therapy Initial Assessment  Date: 11/15/2021   Patient Name: Regino Pride  MRN: [id number]     : 1929    Date of Service: 11/15/2021    Discharge Recommendations:  Patient would benefit from continued therapy after discharge, 5-7 sessions per week       Assessment   Performance deficits / Impairments: Decreased functional mobility ; Decreased ADL status; Decreased high-level IADLs; Decreased balance; Decreased endurance  Assessment: Pt is a 80 y.o. female admitted with Low back pain, left hip pain, debility, UTI. At baseline, pt lives alone and independent ADLs and fxl mobility using cane vs walker. Pt currently functioning below baseline d/t the above deficits, today requiring SBA/CGA fxl transfers/mobility with RW, SBA/CGA toileting, SBA grooming, min A dressing, and anticipate pt would require overall min A for ADLs. Pt denies back pain throughout session. Discussed d/c plans with pt, who expressed interest in going to IP rehab unit to get stronger prior to return home. At current status, pt not functioning at independent level for safe d/c to home alone, and demonstrates need for ongoing skilled OT at d/c to maximize pt's safety and independence. Prognosis: Good  Decision Making: Medium Complexity  OT Education: OT Role; Plan of Care; ADL Adaptive Strategies; Transfer Training  REQUIRES OT FOLLOW UP: Yes  Activity Tolerance  Activity Tolerance: Patient Tolerated treatment well  Safety Devices  Safety Devices in place: Yes  Type of devices: Call light within reach; Left in chair; Chair alarm in place; Gait belt           Patient Diagnosis(es): The primary encounter diagnosis was Urinary tract infection with hematuria, site unspecified. Diagnoses of Abnormal EKG, Left hip pain, and Unable to ambulate were also pertinent to this visit.      has a past medical history of Arthritis, Endometrial thickening on ultrasound, Glaucoma, Hyperlipidemia, Hypertension, Pneumonia, Two level, 1/2 bath on main level, Bed/Bath upstairs  Home Access: Stairs to enter without rails  Entrance Stairs - Number of Steps: 1+1  Bathroom Shower/Tub: Walk-in shower, Shower chair with back  [employer]: Standard  Bathroom Equipment: Grab bars in shower, Hand-held shower  Bathroom Accessibility: Walker accessible  Home Equipment: Rolling walker, Cane, 4 wheeled walker, Reacher  ADL Assistance: [address]: Needs assistance (children perform all. Pt can do light meal prep)  Ambulation Assistance: Independent (with SPC vs RW in home, rollator outside to get mail)  Transfer Assistance: Independent  Active : No  Patient's  Info: Family  Leisure & Hobbies: watch tv  Additional Comments: Denies recent falls       Objective   Vision: Impaired  Vision Exceptions: Wears glasses for reading (glaucoma)  Hearing: Within functional limits      Orientation  Overall Orientation Status: Within Functional Limits  Orientation Level: Oriented to person; Oriented to time; Oriented to place; Oriented to situation     Balance  Sitting Balance: Stand by assistance  Standing Balance: Stand by assistance  Functional Mobility  Functional - Mobility Device: Rolling Walker  Activity: To/from bathroom  Assist Level: Contact guard assistance  Functional Mobility Comments: Pt completed fxl mobility to/from BR ~10 ft, ~25 ft with RW and SBA/CGA. ADL  Grooming: Stand by assistance (standing at sink to wash hands/face)  LE Dressing: Minimal assistance (SBA to don shoes, min A to don pull up briefs)  Toileting: Stand by assistance (to void urine/BM at toilet. No clothing management, pericare in seated SBA)  Additional Comments: Anticipate pt is independent feeding, min A bathing and dressing based on ROM/strength, balance, endurance.      Bed mobility  Supine to Sit: Stand by assistance (HOB elevated)  Sit to Supine: Unable to assess (pt seated in recliner at end of session)  Scooting: Stand by assistance     Transfers  Sit to stand: Stand by assistance  Stand to sit: Contact guard assistance (VC's for hand placement, pt tends to sit without reaching back and with uncontrolled descent)  Transfer Comments: to/from [employer] - Technique: Ambulating  Equipment Used: Grab bars  Toilet Transfer: Contact guard assistance; Stand by assistance    Cognition  Overall Cognitive Status: WFL     LUE AROM (degrees)  LUE AROM : WFL  Left Hand AROM (degrees)  Left Hand General AROM: arthritis in digits  RUE AROM (degrees)  RUE AROM : WFL  Right Hand AROM (degrees)  Right Hand General AROM: arthritis in digits     LUE Strength  Gross LUE Strength: WFL  RUE Strength  Gross RUE Strength: WFL                   Plan   Plan  Times per week: 3-5  Current Treatment Recommendations: Functional Mobility Training, Balance Training, Strengthening, Endurance Training, Self-Care / ADL, Safety Education & Training, Equipment Evaluation, Education, & procurement             AM-PAC Score        AM-[facility] Inpatient Daily Activity Raw Score: 19 (11/15/21 1037)  AM-PAC Inpatient ADL T-Scale Score : 40.22 (11/15/21 1037)  ADL Inpatient CMS 0-100% Score: 42.8 (11/15/21 1037)  ADL Inpatient CMS G-Code Modifier : CK (11/15/21 1037)    Goals  Short term goals  Time Frame for Short term goals: Prior to d/c:  Short term goal 1: Pt will bathe with supervision. Short term goal 2: Pt will dress with supervision. Short term goal 3: Pt will toilet with supervision. Short term goal 4: Pt will complete fxl mobility and fxl transfers to/from ADL surfaces with supervision using LRAD. Short term goal 5: Pt will complete item retrieval/transport in room in prep for ADL task with supervision. Long term goals  Time Frame for Long term goals : STGs=LTGs  Patient Goals   Patient goals : \"to get better. \"       Therapy Time   Individual Concurrent Group Co-treatment   Time In 8335         Time Out 0950         Minutes 26         Timed Code Treatment Minutes: 11 Minutes     This note to serve as OT d/c summary if pt is d/c-ed prior to next therapy session.     Castillo Canseco, OTR/L 3030

## 2021-11-15 NOTE — PROGRESS NOTES
Physical Therapy  Facility/Department: [facility] (formerly [facility] 5W PROGRESSIVE CARE  Daily Treatment Note  NAME: Ralph Walters  : 1929  MRN: [id number]    Date of Service: 11/15/2021  Ralph Walters scored a 16/24 on the AM-PAC short mobility form. Current research shows that an AM-PAC score of 17 or less is typically not associated with a discharge to the patient's home setting. Based on the patient's AM-PAC score and their current functional mobility deficits, it is recommended that the patient have 5-7 sessions per week of Physical Therapy at d/c to increase the patient's independence. At this time, this patient demonstrates the endurance, and/or tolerance for 3 hours of therapy each day, with a treatment frequency of 5-7x/wk. Please see assessment section for further patient specific details. If patient discharges prior to next session this note will serve as a discharge summary. Please see below for the latest assessment towards goals. Discharge Recommendations:  Patient would benefit from continued therapy after discharge, 5-7 sessions per week   PT Equipment Recommendations  Equipment Needed: No    Assessment   Assessment: The patient is a 80 y.o. female with hx HTN, HLD who presents to [facility] on 21 with left lower back and hip pain. Prior to admission, pt living alone in house setting with bedroom/bath on 2nd level of home, using [facility] for ambulation, independent with ADLs, assist from family for homemaking. Pt currently functioning below baseline, requiring CGA for all mobility and the use of a RW for ambulation, attempted to use SPC and Pt. required Min A. Pt reporting family is unable to provide 24hr supv, therefore recommend continued skilled therapy 5-7x/week to maximize independence and safety with functional mobility. Treatment Diagnosis: impaired mobility  Prognosis: Good  PT Education: PT Role; Plan of Care;  Functional Mobility Training; General Safety; Gait Training; Transfer Training  REQUIRES PT FOLLOW UP: Yes  Activity Tolerance  Activity Tolerance: Patient Tolerated treatment well  Activity Tolerance: Reports fatigue and wanted to lie down at end of session but agreeable to therapy     Patient Diagnosis(es): The primary encounter diagnosis was Urinary tract infection with hematuria, site unspecified. Diagnoses of Abnormal EKG, Left hip pain, and Unable to ambulate were also pertinent to this visit. has a past medical history of Arthritis, Endometrial thickening on ultrasound, Glaucoma, Hyperlipidemia, Hypertension, Pneumonia, Post-menopausal bleeding, and TIA (transient ischemic attack). has a past surgical history that includes Thyroidectomy, partial; Varicose vein surgery; Breast surgery; and Hip Arthroplasty (Left, 08/26/2016). Restrictions  Restrictions/Precautions  Restrictions/Precautions: Fall Risk (High)  Subjective   General  Chart Reviewed: Yes  Response To Previous Treatment: Patient with no complaints from previous session. Family / Caregiver Present: Yes (Son)  Referring Practitioner: Lidia Dillon MD  Subjective  Subjective: Supine in bed and agreeable to therapy.   Denies pain at rest.  Has had pain meds for her back pain          Orientation  Orientation  Overall Orientation Status: Within Functional Limits  Cognition      Objective   Bed mobility  Supine to Sit: Supervision  Sit to Supine: Supervision  Scooting: Supervision  Comment: HOB 40 degrees elevated  Transfers  Sit to Stand: Contact guard assistance  Stand to sit: Moderate Assistance; Contact guard assistance (Had 2 episodes of turning toward chair and attempts to sit prior to getting turned around and required Mod A)  Ambulation  Ambulation?: Yes  More Ambulation?: Yes  Ambulation 1  Surface: level tile; carpet  Device: Rolling Walker  Assistance: Contact guard assistance  Quality of Gait: very narrow GARY  Gait Deviations: Slow Jacque; Decreased step length; Decreased step height  Distance: 120' x 1  Comments: Over carpet briefly, tended to veer toward the R and hit 2 objects while walking  Ambulation 2  Surface - 2: level tile  Device 2: Single point cane  Assistance 2: Minimal assistance  Quality of Gait 2: Unsteady with SPC at this time  Distance: 20' x 2     Balance  Posture: Fair  Sitting - Static: Good  Sitting - Dynamic: Good  Standing - Static: Fair; +  Standing - Dynamic: Fair; +  Comments: Unsteady with SPC.             Comment: Set up for comfort in the bed, Spiritual care presented                                                                        AM-PAC Score  AM-PAC Inpatient Mobility Raw Score : 16 (11/15/21 1533)  AM-PAC Inpatient T-Scale Score : 40.78 (11/15/21 1533)  Mobility Inpatient CMS 0-100% Score: 54.16 (11/15/21 1533)  Mobility Inpatient CMS G-Code Modifier : CK (11/15/21 1533)          Goals  Short term goals  Time Frame for Short term goals: by acute discharge  Short term goal 1: bed mobility with supv  Short term goal 2: sit<>Stand with supv  Short term goal 3: ambulate > 48' with RW and SBA  Patient Goals   Patient goals : pain relief    Plan    Plan  Times per week: 3-5x/week  Current Treatment Recommendations: Strengthening, Functional Mobility Training, Transfer Training, Balance Training, Gait Training, Stair training, Neuromuscular Re-education, Patient/Caregiver Education & Training, Safety Education & Training, Equipment Evaluation, Education, & procurement  Safety Devices  Type of devices: Bed alarm in place, Call light within reach, Gait belt, Patient at risk for falls, Left in bed, Nurse notified  Restraints  Initially in place: No     Therapy Time   Individual Concurrent Group Co-treatment   Time In 1230         Time Out 1255         Minutes 25         Timed Code Treatment Minutes: [address], [place], [id number]

## 2021-11-15 NOTE — CARE COORDINATION
Updated pt on discharge plans. She is still waiting for Dr. Nicole Lynch from the ARU to complete an assessment. There are presently no beds available on the ARU so pt will be staying the night. Will touch base with the ARU liaison tomorrow morning.     Electronically signed by AVINASH Vasquez RN-[facility]  Case Management  [phone]

## 2021-11-15 NOTE — DISCHARGE SUMMARY
Hospital Medicine Discharge Summary    Patient ID: Eun Chaparro      Patient's PCP: Monalisa Andres MD    Admit Date: 11/13/2021     Discharge Date:   11/15/2021    Admitting Physician: Nydia Tavarez MD     Discharge Physician: Nydia Tavarez MD     Discharge Diagnoses: Active Hospital Problems    Diagnosis Date Noted    Urinary tract infection with hematuria [N39.0, R31.9] 07/12/2015     Priority: High    Scoliosis of lumbosacral region due to degenerative disease of spine in adult [M41.87] 11/14/2021    History of left hip hemiarthroplasty [Z96.642] 11/14/2021    Low back pain [M54.50] 11/13/2021       The patient was seen and examined on day of discharge and this discharge summary is in conjunction with any daily progress note from day of discharge. Hospital Course: The patient is a 80 y.o. female with hx HTN, HLD who presents to [facility] with left lower back and hip pain. Patient states that she has had gradually worsening left lower back and hip pain over the past several days. She had a hip replacement several years ago. She describes the pain as sharp, 9/10 in severity, with rest making the pain better and weight bearing making the pain worse. She is struggling to ambulate due to the pain. She lives by herself at home, so she was worried and decided to come to the hospital for further evaluation. Denies fever, chills, chest pain, shortness of breath, abdominal pain, nausea, vomiting, constipation, diarrhea, and dysuria.     In the ED, labs were significant for a sodium of 135, chloride of 98, glucose of 106. U/A showed evidence of UTI. CXR showed no acute process. CT Left Hip without contrast showed no acute abnormality, and no fracture or findings of prosthetic loosening with an incidental 3.1 cm distal AAA. CT Lumbar Spine without contrast showed no acute lumbar spine abnormality with severe multilevel degenerative changes without significant canal stenosis.       Low back pain, left hip pain - suspect 2/2 musculoskeletal or degenerative changes. Outpatient orthopedic surgery notes reviewed. No acute abnormalities on CT.  -trial IV Solumedrol -> discharge on prednisone taper  -Robaxin  -IV Toradol  -orthopedic surgery consulted  -will defer any additional imaging or interventions to ortho  -PT/OT eval     Debility  -management as above  -ARU eval     UTI  -continue empiric IV Rocephin  -follow-up urine culture sensitivities  -discharge on Omnicef; if sensitivities breed resistance, can adjust antibiotics for ARU     Abnormal EKG  -tele monitoring  -continue to monitor     Essential hypertension  -continue home meds     Hyperlipidemia  -continue home meds     Hypothyroidism  -continue home meds     GERD  -continue home meds      Exam:     BP (!) 131/58   Pulse 88   Temp 98.6 °F (37 °C) (Oral)   Resp 12   Ht 5' 6\" (1.676 m)   Wt 97 lb (44 kg)   SpO2 95%   BMI 15.66 kg/m²       General appearance:  No apparent distress, appears stated age and cooperative. HEENT:  Normal cephalic, atraumatic without obvious deformity. Pupils equal, round, and reactive to light. Extra ocular muscles intact. Conjunctivae/corneas clear. Neck: Supple, with full range of motion. No jugular venous distention. Trachea midline. Respiratory:  Normal respiratory effort. Clear to auscultation, bilaterally without Rales/Wheezes/Rhonchi. Cardiovascular:  Regular rate and rhythm with normal S1/S2 without murmurs, rubs or gallops. Abdomen: Soft, non-tender, non-distended with normal bowel sounds. Musculoskeletal:  No clubbing, cyanosis or edema bilaterally. Full range of motion without deformity. Skin: Skin color, texture, turgor normal.  No rashes or lesions. Neurologic:  Neurovascularly intact without any focal sensory/motor deficits.  Cranial nerves: II-XII intact, grossly non-focal.  Psychiatric:  Alert and oriented, thought content appropriate, normal insight  Capillary Refill: Brisk,< 3 seconds   Peripheral Pulses: +2 palpable, equal bilaterally       Labs: For convenience and continuity at follow-up the following most recent labs are provided:      CBC:    Lab Results   Component Value Date    WBC 8.3 11/15/2021    HGB 11.2 11/15/2021    HCT 34.2 11/15/2021     11/15/2021       Renal:    Lab Results   Component Value Date     11/15/2021    K 4.1 11/15/2021     11/15/2021    CO2 22 11/15/2021    BUN 28 11/15/2021    CREATININE 0.9 11/15/2021    CALCIUM 8.4 11/15/2021    PHOS 3.0 07/15/2015         Significant Diagnostic Studies    Radiology:   CT HIP LEFT WO CONTRAST   Final Result   1. No acute abnormality. No fracture or findings of prosthetic loosening. No findings to account for the patient's left hip pain   2. 3.1 cm distal abdominal aortic aneurysm. If clinically indicated,   consider nonemergent CT of the abdomen to accurately measure the entire aorta         CT LUMBAR SPINE WO CONTRAST   Final Result   No acute lumbar spine abnormality      Severe multilevel degenerative changes without significant canal stenosis         XR CHEST PORTABLE   Final Result   No acute process.                 Consults:     IP CONSULT TO ORTHOPEDIC SURGERY  IP CONSULT TO PHYSICAL MEDICINE REHAB    Disposition:  Inpatient rehab     Condition at Discharge: Stable    Discharge Instructions/Follow-up:  meds as prescribed, follow-up with PCP    Code Status:  DNR-CCA     Activity: activity as tolerated    Diet: regular diet      Discharge Medications:     Current Discharge Medication List           Details   predniSONE (DELTASONE) 10 MG tablet Take 4 tabs (40 mg) x 2 days, then 3 tabs (30 mg) x 2 days, then 2 tabs (20 mg) x 2 days, then 1 tab (10 mg) x 2 days  Qty: 20 tablet, Refills: 0      methocarbamol (ROBAXIN) 500 MG tablet Take 2 tablets by mouth 4 times daily for 10 days  Qty: 80 tablet, Refills: 0      cefdinir (OMNICEF) 300 MG capsule Take 1 capsule by mouth 2 times daily for 4

## 2021-11-15 NOTE — DISCHARGE INSTR - COC
Continuity of Care Form    Patient Name: Lisseth Ma   :  1929  MRN:  [id number]    Admit date:  2021  Discharge date:  ***    Code Status Order: DNR-CCA   Advance Directives:      Admitting Physician:  Prasanth Ty MD  PCP: Kong Pérez MD    Discharging Nurse: [facility] Unit/Room#: D3S-0580/0915-49  Discharging Unit Phone Number: ***    Emergency Contact:   Extended Emergency Contact Information  Primary Emergency Contact: Denise Quintana [facility] of [address] Phone: [phone]  Relation: Child  Secondary Emergency Contact: [address] Phone: [phone]  Relation: Child    Past Surgical History:  Past Surgical History:   Procedure Laterality Date    BREAST SURGERY      left lumpectomy    HIP ARTHROPLASTY Left 2016    hemiarthroplasty left hip    THYROIDECTOMY, PARTIAL      VARICOSE VEIN SURGERY         Immunization History:   Immunization History   Administered Date(s) Administered    COVID-19, Pfizer, PF, 30mcg/0.3mL 2021, 2021, 10/02/2021    Influenza, High Dose (Fluzone 65 yrs and older) 10/16/2019    Tdap (Boostrix, Adacel) 2016       Active Problems:  Patient Active Problem List   Diagnosis Code    Urinary tract infection with hematuria N39.0, R31.9    Hyponatremia E87.1    Nausea & vomiting R11.2    Left displaced femoral neck fracture ([address] S72.002A    Community acquired pneumonia J18.9    Acute respiratory failure with hypoxia ([address] J96.01    High anion gap metabolic acidosis R54.1    Hypoxia R09.02    Rhinitis J31.0    Pneumatocele of lung J98.4    Sciatica of left side M54.32    Low back pain M54.50    Scoliosis of lumbosacral region due to degenerative disease of spine in adult M41.87    History of left hip hemiarthroplasty Z96.642       Isolation/Infection:   Isolation            No Isolation          Patient Infection Status       None to display            Nurse Assessment:  Last Vital Signs: BP (!) 131/58   Pulse 88   Temp 98.6 °F (37 °C) (Oral)   Resp 12   Ht 5' 6\" (1.676 m)   Wt 97 lb (44 kg)   SpO2 95%   BMI 15.66 kg/m²     Last documented pain score (0-10 scale): Pain Level: 0  Last Weight:   Wt Readings from Last 1 Encounters:   11/15/21 97 lb (44 kg)     Mental Status:  {IP PT MENTAL STATUS:20030}    IV Access:  { EMELINA IV ACCESS:504771352}    Nursing Mobility/ADLs:  Walking   {CHP DME URVV:591036019}  Transfer  {CHP DME LERM:614565353}  Bathing  {CHP DME CXQN:128837889}  Dressing  {CHP DME QUYR:573621147}  Toileting  {CHP DME OWSR:817963440}  Feeding  {CHP DME XKJS:691063331}  Med Admin  {P DME OOYT:151386109}  Med Delivery   { EMELINA MED Delivery:373787405}    Wound Care Documentation and Therapy:  Incision 08/26/16 Hip Left (Active)   Number of days: 1906       Wound 01/02/20 Pretibial Right lower skin tear (Active)   Number of days: 682       Wound 01/02/20 Pretibial Right large bruise with abrasion  (Active)   Number of days: 682        Elimination:  Continence: Bowel: {YES / GW:25663}  Bladder: {YES / SW:41881}  Urinary Catheter: {Urinary Catheter:025031325}   Colostomy/Ileostomy/Ileal Conduit: {YES / XV:36924}       Date of Last BM: ***    Intake/Output Summary (Last 24 hours) at 11/15/2021 1132  Last data filed at 11/15/2021 1037  Gross per 24 hour   Intake 560 ml   Output --   Net 560 ml     I/O last 3 completed shifts:   In: 56 [P.O.:740]  Out: -     Safety Concerns:     [address] Safety Concerns:989423429}    Impairments/Disabilities:      [address] Impairments/Disabilities:779980899}    Nutrition Therapy:  Current Nutrition Therapy:   [address] Diet List:250957487}    Routes of Feeding: {P DME Other Feedings:327303484}  Liquids: {Slp liquid thickness:34191}  Daily Fluid Restriction: {CHP DME Yes amt example:986608151}  Last Modified Barium Swallow with Video (Video Swallowing Test): {Done Not Done ZWAF:232128263}    Treatments at the Time of Hospital Discharge:   Respiratory Treatments: ***  Oxygen Therapy:  {Therapy; copd oxygen:32223}  Ventilator:    {[facility] Vent IRQE:215394217}    Rehab Therapies: {THERAPEUTIC INTERVENTION:7636730097}  Weight Bearing Status/Restrictions: {[facility] Weight Bearin}  Other Medical Equipment (for information only, NOT a DME order):  {EQUIPMENT:352593237}  Other Treatments: ***    Patient's personal belongings (please select all that are sent with patient):  {CHP DME Belongings:029905688}    RN SIGNATURE:  {Esignature:600325265}    CASE MANAGEMENT/SOCIAL WORK SECTION    Inpatient Status Date: ***    Readmission Risk Assessment Score:  Readmission Risk              Risk of Unplanned Readmission:  12           Discharging to Facility/ Agency   Name:   Address:  Phone:  Fax:    Dialysis Facility (if applicable)   Name:  Address:  Dialysis Schedule:  Phone:  Fax:    / signature: {Esignature:876760758}    PHYSICIAN SECTION    Prognosis: Good    Condition at Discharge: Stable    Rehab Potential (if transferring to Rehab): Good    Recommended Labs or Other Treatments After Discharge: meds as prescribed, follow-up with PCP    Physician Certification: I certify the above information and transfer of Ralph Walters  is necessary for the continuing treatment of the diagnosis listed and that she requires Acute Rehab for less 30 days.      Update Admission H&P: No change in H&P    PHYSICIAN SIGNATURE:  Electronically signed by Александр Sommers MD on 11/15/21 at 11:33 AM EST

## 2021-11-15 NOTE — CARE COORDINATION
Case Management Assessment           Initial Evaluation                Date / Time of Evaluation: 11/15/2021 12:24 PM                 Assessment Completed by: Nury Mak RN     Met with pt at the bedside to complete assessment. She immediately cut CM off and stated she was told she was going to the ARU here. When asked who told her, she stated \"they did. \" She is not able to elaborate. Insisted CM find out. An order was enetered yesterday by Dr. Myesha San. A call was placed to the ARU liaison to inquire about a referral and bed availability. Patient Name: Lizeth Canales     YOB: 1929  Diagnosis: Low back pain [M54.50]  Abnormal EKG [R94.31]  Left hip pain [M25.552]  Unable to ambulate [R26.2]  Urinary tract infection with hematuria, site unspecified [N39.0, R31.9]     Date / Time: 11/13/2021  7:29 AM    Patient Admission Status: Inpatient    If patient is discharged prior to next notation, then this note serves as note for discharge by case management. Current PCP: Guilherme Donahue MD    Chart Reviewed: Yes  Patient/ Family Interviewed: Yes    Emergency Contacts:  Extended Emergency Contact Information  Primary Emergency Contact: Huang Joshua [address] Phone: [phone]  Relation: Child  Secondary Emergency Contact: [address] [place] [address] Phone: [phone]  Relation: Child    Advance Directives:   Code Status: DNR-CCA    Financial  Payor: MEDICARE / Plan: MEDICARE PART A AND B / Product Type: *No Product type* /     Pre-cert required for SNF: No    Pharmacy    [place] [address] [address] [facility],[address], [address] [address]  Phone: [phone] Fax: [phone]      ADLS Independent at baseline.  Needs assistance now  Support Systems: Children    PT AM-PAC: 17 /24  OT AM-PAC: 19 /24    HOUSING  Home Environment: house with bed/bath on 2nd floor  Steps: 1+1    Plans to RETURN to current housing: Yes    Home Care Information  Currently ACTIVE with Home Health Care: No    Durable Medical Equipment  Equipment: tub-walk-in-shower, shower bench with back, grab bars, rolling walker, cane    DISCHARGE PLAN:  [facility]    Electronically signed by Raphael Gonzales MSN RN-[facility]  Case Management  [phone]

## 2021-11-15 NOTE — CONSULTS
Consult Note  Physical Medicine and Rehabilitation    Patient: Darryle Oris  [id number]  Date: 11/15/2021      Chief Complaint: low back and left hip pain    History of Present Illness/Hospital Course:  Patient is a 81 yo F with pmh HTN, HLD, TIA, OA s/p left VITO who initially presented 11/13/2021 with low back and left hip pain. Symptoms started ~1 week ago with no clear inciting event. She has had persistent difficulty with ambulation and progressive weakness/fatigue which prompted her to present to ED. She was found to have klebsiella/proteus UTI. CT left hip negative for acute abnormality or hardware complications. CT lumbar spine showed severe multilevel degenerative changes with no significant canal stenosis. She was seen by Ortho. She is being treated with steroid taper. Currently, patient reports some improvement in her low back and left hip pain. Pain is localized to the left low back/upper buttock and lateral left hip. Described as sharp. Worse with movement and weight bearing. Improves with rest. She denies tingling/numbness or focal weakness. She feels she needs rehab prior to returning home. Prior Level of Function:  Independent for mobility with cane, ADLs    Current Level of Function:  SBA-modA for mobility and ADLs    Pertinent Social History:  Support: Lives alone  Home set-up: 2 level home with 2 steps to enter, bed/bath upstairs    Past Medical History:   Diagnosis Date    Arthritis     Endometrial thickening on ultrasound     Glaucoma     Hyperlipidemia     Hypertension     Pneumonia     Post-menopausal bleeding     TIA (transient ischemic attack)        Past Surgical History:   Procedure Laterality Date    BREAST SURGERY      left lumpectomy    HIP ARTHROPLASTY Left 08/26/2016    hemiarthroplasty left hip    THYROIDECTOMY, PARTIAL      VARICOSE VEIN SURGERY         History reviewed. No pertinent family history.     Social History     Socioeconomic History    Marital status:      Spouse name: None    Number of children: None    Years of education: None    Highest education level: None   Occupational History    Occupation: retired   Tobacco Use    Smoking status: Never Smoker    Smokeless tobacco: Never Used   Vaping Use    Vaping Use: Never used   Substance and Sexual Activity    Alcohol use: Yes     Comment: occ    Drug use: No    Sexual activity: Not Currently   Other Topics Concern    None   Social History Narrative    None     Social Determinants of Health     Financial Resource Strain:     Difficulty of Paying Living Expenses: Not on file   Food Insecurity:     Worried About Running Out of Food in the Last Year: Not on file    Luis of Food in the Last Year: Not on file   Transportation Needs:     Lack of Transportation (Medical): Not on file    Lack of Transportation (Non-Medical):  Not on file   Physical Activity:     Days of Exercise per Week: Not on file    Minutes of Exercise per Session: Not on file   Stress:     Feeling of Stress : Not on file   Social Connections:     Frequency of Communication with Friends and Family: Not on file    Frequency of Social Gatherings with Friends and Family: Not on file    Attends [employer] Services: Not on file    Active Member of [address] [place] [address] or Organizations: Not on file    Attends Club or Organization Meetings: Not on file    Marital Status: Not on file   Intimate Partner Violence:     Fear of Current or Ex-Partner: Not on file    Emotionally Abused: Not on file    Physically Abused: Not on file    Sexually Abused: Not on file   Housing Stability:     Unable to Pay for Housing in the Last Year: Not on file    Number of [place] in the Last Year: Not on file    Unstable Housing in the Last Year: Not on file           REVIEW OF SYSTEMS:   CONSTITUTIONAL: negative for fevers, chills, diaphoresis, appetite change, night sweats, unexpected weight change, + fatigue  EYES: negative for blurred vision, eye discharge, visual disturbance and icterus  HEENT: negative for hearing loss, tinnitus, ear drainage, sinus pressure, nasal congestion, epistaxis and snoring  RESPIRATORY: Negative for hemoptysis, cough, sputum production  CARDIOVASCULAR: negative for chest pain, palpitations, exertional chest pressure/discomfort, syncope, edema  GASTROINTESTINAL: negative for nausea, vomiting, diarrhea, blood in stool, abdominal pain, constipation  GENITOURINARY: negative for frequency, dysuria, urinary incontinence, decreased urine volume, and hematuria  HEMATOLOGIC/LYMPHATIC: negative for easy bruising, bleeding and lymphadenopathy  ALLERGIC/IMMUNOLOGIC: negative for recurrent infections, angioedema, anaphylaxis and drug reactions  ENDOCRINE: negative for weight changes and diabetic symptoms including polyuria, polydipsia and polyphagia  MUSCULOSKELETAL: refer to HPI  NEUROLOGICAL: negative for headaches, slurred speech, unilateral weakness  PSYCHIATRIC/BEHAVIORAL: negative for hallucinations, behavioral problems, confusion and agitation. Physical Examination:  Vitals:   Patient Vitals for the past 24 hrs:   BP Temp Temp src Pulse Resp SpO2 Weight   11/15/21 1456 116/63 98.4 °F (36.9 °C) Oral 91 18 96 % --   11/15/21 1111 (!) 131/58 -- -- -- -- -- --   11/15/21 1109 (!) 104/44 98.6 °F (37 °C) Oral 88 12 95 % --   11/15/21 0951 -- -- -- -- -- 99 % --   11/15/21 0737 -- -- -- 81 -- -- --   11/15/21 0724 (!) 150/82 98.4 °F (36.9 °C) Oral 81 14 99 % --   11/15/21 0508 136/79 97.3 °F (36.3 °C) Oral 84 17 99 % --   11/15/21 0503 -- -- -- -- -- -- 97 lb (44 kg)     Const: Alert. WDWN. No distress  Eyes: Conjunctiva noninjected, no icterus noted; pupils equal, round, and reactive to light. HENT: Atraumatic, normocephalic; Oral mucosa moist  Neck: Trachea midline, neck supple. No thyromegaly noted.   CV: Regular rate and rhythm, no murmur rub or gallop noted  Resp: Lungs clear to auscultation bilaterally, no rales wheezes or rhonchi, no retractions. Respirations unlabored. GI: Soft, nontender, nondistended. Normal bowel sounds. No palpable masses. Skin: Normal temperature and turgor. No rashes or breakdown noted. Ext: No significant edema appreciated. No varicosities. MSK: Kyphotic posture. Arthritic changes noted in multiple joints. No joint tenderness, erythema, warmth noted. Decreased spine and hip ROM. Negative straight leg raise. Unable to elicit left hip pain with passive ROM. Neuro: Alert, oriented, appropriate. No cranial nerve deficits appreciated. Sensation intact to light touch. Motor examination reveals strength 5-/5 in BUE and BLE except 4/5 right hip flexion and 3/5 left hip flexion. No abnormalities with finger/nose noted. Reflexes diminished, symmetric. Psych: Stable mood, normal judgement, normal affect     Lab Results   Component Value Date    WBC 8.3 11/15/2021    HGB 11.2 (L) 11/15/2021    HCT 34.2 (L) 11/15/2021    MCV 91.5 11/15/2021     11/15/2021     Lab Results   Component Value Date    INR 0.89 08/26/2016    PROTIME 10.1 08/26/2016     Lab Results   Component Value Date    CREATININE 0.9 11/15/2021    BUN 28 (H) 11/15/2021     (L) 11/15/2021    K 4.1 11/15/2021     11/15/2021    CO2 22 11/15/2021     Lab Results   Component Value Date    ALT <5 (L) 11/15/2021    AST 11 (L) 11/15/2021    ALKPHOS 76 11/15/2021    BILITOT <0.2 11/15/2021         Most recent EKG revealed:  Sinus rhythm with frequent and consecutive Premature ventricular complexesLeft axis deviationPoor R wave progressionAbnormal ECGWhen compared with ECG of 09-JUL-2020 16:56,Premature ventricular complexes are now PresentConfirmed by Bonifacio MICHAEL MD ([id number]) on 11/14/2021 2:37:59 PM    Most recent imaging studies revealed:    CT left hip  1. No acute abnormality.  No fracture or findings of prosthetic loosening.    No findings to account for the patient's left hip pain   2. 3.1 cm distal abdominal aortic aneurysm. Yenny Boyer clinically indicated,   consider nonemergent CT of the abdomen to accurately measure the entire aorta           CT lumbar spine  No acute lumbar spine abnormality       Severe multilevel degenerative changes without significant canal stenosis       On my review, CXR displays no consolidations or effusions. Assessment:  Debility  Lumbar spondylosis and degenerative disc disease  Klebsiella/proteus UTI  Abnormal EKG  Hyponatremia  HTN  HLD  Hypothyroidism  GERD  H/o OA s/p left VITO    Impairments: generalized weakness, decreased spine/hip ROM, balance, endurance    Recommendations:    Patient with new functional deficits and ongoing medical complexity. Demonstrates ability to tolerate 3 hours therapy/day. She is a good candidate for acute inpatient rehab when medically appropriate. Anticipate bed availability 11/16. Thank you for this consult. Please contact me with any questions or concerns. Leonor Edwards.  Carlene Juares MD 11/15/2021, 4:02 PM

## 2021-11-16 ENCOUNTER — HOSPITAL ENCOUNTER (INPATIENT)
Age: 86
LOS: 8 days | Discharge: HOME HEALTH | DRG: 948 | End: 2021-11-24
Attending: PHYSICAL MEDICINE & REHABILITATION | Admitting: PHYSICAL MEDICINE & REHABILITATION
Payer: MEDICARE

## 2021-11-16 VITALS
SYSTOLIC BLOOD PRESSURE: 122 MMHG | BODY MASS INDEX: 15.77 KG/M2 | WEIGHT: 98.11 LBS | DIASTOLIC BLOOD PRESSURE: 68 MMHG | RESPIRATION RATE: 16 BRPM | HEART RATE: 90 BPM | HEIGHT: 66 IN | OXYGEN SATURATION: 93 % | TEMPERATURE: 97.7 F

## 2021-11-16 DIAGNOSIS — M54.32 SCIATICA OF LEFT SIDE: Primary | ICD-10-CM

## 2021-11-16 PROBLEM — R53.81 DEBILITY: Status: ACTIVE | Noted: 2021-11-16

## 2021-11-16 LAB
A/G RATIO: 1.4 (ref 1.1–2.2)
ALBUMIN SERPL-MCNC: 3.2 G/DL (ref 3.4–5)
ALP BLD-CCNC: 64 U/L (ref 40–129)
ALT SERPL-CCNC: 7 U/L (ref 10–40)
ANION GAP SERPL CALCULATED.3IONS-SCNC: 12 MMOL/L (ref 3–16)
AST SERPL-CCNC: 12 U/L (ref 15–37)
BASOPHILS ABSOLUTE: 0 K/UL (ref 0–0.2)
BASOPHILS RELATIVE PERCENT: 0.1 %
BILIRUB SERPL-MCNC: <0.2 MG/DL (ref 0–1)
BUN BLDV-MCNC: 29 MG/DL (ref 7–20)
CALCIUM SERPL-MCNC: 8.4 MG/DL (ref 8.3–10.6)
CHLORIDE BLD-SCNC: 103 MMOL/L (ref 99–110)
CO2: 23 MMOL/L (ref 21–32)
CREAT SERPL-MCNC: 0.8 MG/DL (ref 0.6–1.2)
EOSINOPHILS ABSOLUTE: 0 K/UL (ref 0–0.6)
EOSINOPHILS RELATIVE PERCENT: 0.2 %
GFR AFRICAN AMERICAN: >60
GFR NON-AFRICAN AMERICAN: >60
GLUCOSE BLD-MCNC: 91 MG/DL (ref 70–99)
HCT VFR BLD CALC: 32.6 % (ref 36–48)
HEMOGLOBIN: 10.8 G/DL (ref 12–16)
LYMPHOCYTES ABSOLUTE: 1.8 K/UL (ref 1–5.1)
LYMPHOCYTES RELATIVE PERCENT: 21.8 %
MAGNESIUM: 1.9 MG/DL (ref 1.8–2.4)
MCH RBC QN AUTO: 30.1 PG (ref 26–34)
MCHC RBC AUTO-ENTMCNC: 33 G/DL (ref 31–36)
MCV RBC AUTO: 91.3 FL (ref 80–100)
MONOCYTES ABSOLUTE: 0.8 K/UL (ref 0–1.3)
MONOCYTES RELATIVE PERCENT: 9.8 %
NEUTROPHILS ABSOLUTE: 5.5 K/UL (ref 1.7–7.7)
NEUTROPHILS RELATIVE PERCENT: 68.1 %
ORGANISM: ABNORMAL
ORGANISM: ABNORMAL
PDW BLD-RTO: 14.9 % (ref 12.4–15.4)
PLATELET # BLD: 233 K/UL (ref 135–450)
PMV BLD AUTO: 7.5 FL (ref 5–10.5)
POTASSIUM REFLEX MAGNESIUM: 3.5 MMOL/L (ref 3.5–5.1)
RBC # BLD: 3.57 M/UL (ref 4–5.2)
SODIUM BLD-SCNC: 138 MMOL/L (ref 136–145)
TOTAL PROTEIN: 5.5 G/DL (ref 6.4–8.2)
URINE CULTURE, ROUTINE: ABNORMAL
URINE CULTURE, ROUTINE: ABNORMAL
WBC # BLD: 8.1 K/UL (ref 4–11)

## 2021-11-16 PROCEDURE — 85025 COMPLETE CBC W/AUTO DIFF WBC: CPT

## 2021-11-16 PROCEDURE — 94150 VITAL CAPACITY TEST: CPT

## 2021-11-16 PROCEDURE — 6360000002 HC RX W HCPCS: Performed by: INTERNAL MEDICINE

## 2021-11-16 PROCEDURE — 6370000000 HC RX 637 (ALT 250 FOR IP): Performed by: INTERNAL MEDICINE

## 2021-11-16 PROCEDURE — 6370000000 HC RX 637 (ALT 250 FOR IP): Performed by: PHYSICAL MEDICINE & REHABILITATION

## 2021-11-16 PROCEDURE — 80053 COMPREHEN METABOLIC PANEL: CPT

## 2021-11-16 PROCEDURE — 2580000003 HC RX 258: Performed by: PHYSICAL MEDICINE & REHABILITATION

## 2021-11-16 PROCEDURE — 36415 COLL VENOUS BLD VENIPUNCTURE: CPT

## 2021-11-16 PROCEDURE — 1280000000 HC REHAB R&B

## 2021-11-16 PROCEDURE — 94761 N-INVAS EAR/PLS OXIMETRY MLT: CPT

## 2021-11-16 PROCEDURE — 83735 ASSAY OF MAGNESIUM: CPT

## 2021-11-16 PROCEDURE — 6360000002 HC RX W HCPCS: Performed by: PHYSICAL MEDICINE & REHABILITATION

## 2021-11-16 PROCEDURE — 2580000003 HC RX 258: Performed by: INTERNAL MEDICINE

## 2021-11-16 RX ORDER — CLOBETASOL PROPIONATE 0.5 MG/G
CREAM TOPICAL 2 TIMES DAILY
Status: CANCELLED | OUTPATIENT
Start: 2021-11-16

## 2021-11-16 RX ORDER — SENNA AND DOCUSATE SODIUM 50; 8.6 MG/1; MG/1
1 TABLET, FILM COATED ORAL 2 TIMES DAILY
Status: DISCONTINUED | OUTPATIENT
Start: 2021-11-16 | End: 2021-11-24 | Stop reason: HOSPADM

## 2021-11-16 RX ORDER — POLYETHYLENE GLYCOL 3350 17 G/17G
17 POWDER, FOR SOLUTION ORAL DAILY PRN
Status: CANCELLED | OUTPATIENT
Start: 2021-11-16

## 2021-11-16 RX ORDER — LANOLIN ALCOHOL/MO/W.PET/CERES
3 CREAM (GRAM) TOPICAL NIGHTLY PRN
Status: CANCELLED | OUTPATIENT
Start: 2021-11-16

## 2021-11-16 RX ORDER — CEFDINIR 300 MG/1
300 CAPSULE ORAL 2 TIMES DAILY
Status: DISCONTINUED | OUTPATIENT
Start: 2021-11-16 | End: 2021-11-16

## 2021-11-16 RX ORDER — METHOCARBAMOL 750 MG/1
750 TABLET, FILM COATED ORAL 4 TIMES DAILY PRN
Status: CANCELLED | OUTPATIENT
Start: 2021-11-16

## 2021-11-16 RX ORDER — SODIUM CHLORIDE 9 MG/ML
25 INJECTION, SOLUTION INTRAVENOUS PRN
Status: DISCONTINUED | OUTPATIENT
Start: 2021-11-16 | End: 2021-11-24 | Stop reason: HOSPADM

## 2021-11-16 RX ORDER — LANOLIN ALCOHOL/MO/W.PET/CERES
3 CREAM (GRAM) TOPICAL NIGHTLY PRN
Status: DISCONTINUED | OUTPATIENT
Start: 2021-11-16 | End: 2021-11-24 | Stop reason: HOSPADM

## 2021-11-16 RX ORDER — PREDNISONE 10 MG/1
10 TABLET ORAL DAILY
Status: COMPLETED | OUTPATIENT
Start: 2021-11-22 | End: 2021-11-23

## 2021-11-16 RX ORDER — LEVOTHYROXINE SODIUM 0.07 MG/1
75 TABLET ORAL DAILY
Status: DISCONTINUED | OUTPATIENT
Start: 2021-11-17 | End: 2021-11-24 | Stop reason: HOSPADM

## 2021-11-16 RX ORDER — PREDNISONE 20 MG/1
40 TABLET ORAL DAILY
Status: COMPLETED | OUTPATIENT
Start: 2021-11-17 | End: 2021-11-17

## 2021-11-16 RX ORDER — ATORVASTATIN CALCIUM 10 MG/1
10 TABLET, FILM COATED ORAL DAILY
Status: CANCELLED | OUTPATIENT
Start: 2021-11-16

## 2021-11-16 RX ORDER — LATANOPROST 50 UG/ML
1 SOLUTION/ DROPS OPHTHALMIC NIGHTLY
Status: CANCELLED | OUTPATIENT
Start: 2021-11-16

## 2021-11-16 RX ORDER — CLOBETASOL PROPIONATE 0.5 MG/G
CREAM TOPICAL 2 TIMES DAILY
Status: DISCONTINUED | OUTPATIENT
Start: 2021-11-16 | End: 2021-11-24 | Stop reason: HOSPADM

## 2021-11-16 RX ORDER — HEPARIN SODIUM 5000 [USP'U]/ML
5000 INJECTION, SOLUTION INTRAVENOUS; SUBCUTANEOUS 2 TIMES DAILY
Status: CANCELLED | OUTPATIENT
Start: 2021-11-16

## 2021-11-16 RX ORDER — AMLODIPINE BESYLATE 5 MG/1
5 TABLET ORAL DAILY
Status: DISCONTINUED | OUTPATIENT
Start: 2021-11-17 | End: 2021-11-24 | Stop reason: HOSPADM

## 2021-11-16 RX ORDER — SODIUM CHLORIDE 0.9 % (FLUSH) 0.9 %
5-40 SYRINGE (ML) INJECTION EVERY 12 HOURS SCHEDULED
Status: DISCONTINUED | OUTPATIENT
Start: 2021-11-16 | End: 2021-11-22

## 2021-11-16 RX ORDER — PREDNISONE 20 MG/1
40 TABLET ORAL DAILY
Status: CANCELLED | OUTPATIENT
Start: 2021-11-17 | End: 2021-11-18

## 2021-11-16 RX ORDER — SODIUM CHLORIDE 0.9 % (FLUSH) 0.9 %
5-40 SYRINGE (ML) INJECTION EVERY 12 HOURS SCHEDULED
Status: CANCELLED | OUTPATIENT
Start: 2021-11-16

## 2021-11-16 RX ORDER — ACETAMINOPHEN 325 MG/1
650 TABLET ORAL EVERY 6 HOURS PRN
Status: DISCONTINUED | OUTPATIENT
Start: 2021-11-16 | End: 2021-11-24 | Stop reason: HOSPADM

## 2021-11-16 RX ORDER — AMLODIPINE BESYLATE 5 MG/1
5 TABLET ORAL DAILY
Status: CANCELLED | OUTPATIENT
Start: 2021-11-16

## 2021-11-16 RX ORDER — METHOCARBAMOL 750 MG/1
750 TABLET, FILM COATED ORAL 4 TIMES DAILY PRN
Status: DISCONTINUED | OUTPATIENT
Start: 2021-11-16 | End: 2021-11-24 | Stop reason: HOSPADM

## 2021-11-16 RX ORDER — SODIUM CHLORIDE 0.9 % (FLUSH) 0.9 %
5-40 SYRINGE (ML) INJECTION PRN
Status: CANCELLED | OUTPATIENT
Start: 2021-11-16

## 2021-11-16 RX ORDER — CIPROFLOXACIN 500 MG/1
500 TABLET, FILM COATED ORAL EVERY 12 HOURS SCHEDULED
Status: COMPLETED | OUTPATIENT
Start: 2021-11-16 | End: 2021-11-21

## 2021-11-16 RX ORDER — LATANOPROST 50 UG/ML
1 SOLUTION/ DROPS OPHTHALMIC NIGHTLY
Status: DISCONTINUED | OUTPATIENT
Start: 2021-11-16 | End: 2021-11-24 | Stop reason: HOSPADM

## 2021-11-16 RX ORDER — LACTOBACILLUS RHAMNOSUS GG 10B CELL
1 CAPSULE ORAL
Status: CANCELLED | OUTPATIENT
Start: 2021-11-17

## 2021-11-16 RX ORDER — PREDNISONE 20 MG/1
20 TABLET ORAL DAILY
Status: CANCELLED | OUTPATIENT
Start: 2021-11-20 | End: 2021-11-22

## 2021-11-16 RX ORDER — ONDANSETRON 4 MG/1
4 TABLET, ORALLY DISINTEGRATING ORAL EVERY 8 HOURS PRN
Status: DISCONTINUED | OUTPATIENT
Start: 2021-11-16 | End: 2021-11-24 | Stop reason: HOSPADM

## 2021-11-16 RX ORDER — DORZOLAMIDE HYDROCHLORIDE AND TIMOLOL MALEATE 20; 5 MG/ML; MG/ML
1 SOLUTION/ DROPS OPHTHALMIC 2 TIMES DAILY
Status: DISCONTINUED | OUTPATIENT
Start: 2021-11-16 | End: 2021-11-24 | Stop reason: HOSPADM

## 2021-11-16 RX ORDER — ONDANSETRON 2 MG/ML
4 INJECTION INTRAMUSCULAR; INTRAVENOUS EVERY 6 HOURS PRN
Status: CANCELLED | OUTPATIENT
Start: 2021-11-16

## 2021-11-16 RX ORDER — ONDANSETRON 4 MG/1
4 TABLET, ORALLY DISINTEGRATING ORAL EVERY 8 HOURS PRN
Status: CANCELLED | OUTPATIENT
Start: 2021-11-16

## 2021-11-16 RX ORDER — ONDANSETRON 2 MG/ML
4 INJECTION INTRAMUSCULAR; INTRAVENOUS EVERY 6 HOURS PRN
Status: DISCONTINUED | OUTPATIENT
Start: 2021-11-16 | End: 2021-11-24 | Stop reason: HOSPADM

## 2021-11-16 RX ORDER — LEVOTHYROXINE SODIUM 0.07 MG/1
75 TABLET ORAL DAILY
Status: CANCELLED | OUTPATIENT
Start: 2021-11-16

## 2021-11-16 RX ORDER — SENNA AND DOCUSATE SODIUM 50; 8.6 MG/1; MG/1
1 TABLET, FILM COATED ORAL 2 TIMES DAILY
Status: CANCELLED | OUTPATIENT
Start: 2021-11-16

## 2021-11-16 RX ORDER — LISINOPRIL 20 MG/1
20 TABLET ORAL DAILY
Status: DISCONTINUED | OUTPATIENT
Start: 2021-11-17 | End: 2021-11-24 | Stop reason: HOSPADM

## 2021-11-16 RX ORDER — LISINOPRIL 20 MG/1
20 TABLET ORAL DAILY
Status: CANCELLED | OUTPATIENT
Start: 2021-11-16

## 2021-11-16 RX ORDER — LACTOBACILLUS RHAMNOSUS GG 10B CELL
1 CAPSULE ORAL
Status: DISCONTINUED | OUTPATIENT
Start: 2021-11-17 | End: 2021-11-24 | Stop reason: HOSPADM

## 2021-11-16 RX ORDER — BISACODYL 10 MG
10 SUPPOSITORY, RECTAL RECTAL DAILY PRN
Status: CANCELLED | OUTPATIENT
Start: 2021-11-16

## 2021-11-16 RX ORDER — PANTOPRAZOLE SODIUM 40 MG/1
40 TABLET, DELAYED RELEASE ORAL
Status: DISCONTINUED | OUTPATIENT
Start: 2021-11-17 | End: 2021-11-24 | Stop reason: HOSPADM

## 2021-11-16 RX ORDER — DORZOLAMIDE HYDROCHLORIDE AND TIMOLOL MALEATE 20; 5 MG/ML; MG/ML
1 SOLUTION/ DROPS OPHTHALMIC 2 TIMES DAILY
Status: CANCELLED | OUTPATIENT
Start: 2021-11-16

## 2021-11-16 RX ORDER — PANTOPRAZOLE SODIUM 40 MG/1
40 TABLET, DELAYED RELEASE ORAL
Status: CANCELLED | OUTPATIENT
Start: 2021-11-17

## 2021-11-16 RX ORDER — ATORVASTATIN CALCIUM 10 MG/1
10 TABLET, FILM COATED ORAL DAILY
Status: DISCONTINUED | OUTPATIENT
Start: 2021-11-17 | End: 2021-11-24 | Stop reason: HOSPADM

## 2021-11-16 RX ORDER — BISACODYL 10 MG
10 SUPPOSITORY, RECTAL RECTAL DAILY PRN
Status: DISCONTINUED | OUTPATIENT
Start: 2021-11-16 | End: 2021-11-24 | Stop reason: HOSPADM

## 2021-11-16 RX ORDER — ACETAMINOPHEN 325 MG/1
650 TABLET ORAL EVERY 6 HOURS PRN
Status: CANCELLED | OUTPATIENT
Start: 2021-11-16

## 2021-11-16 RX ORDER — SODIUM CHLORIDE 9 MG/ML
25 INJECTION, SOLUTION INTRAVENOUS PRN
Status: CANCELLED | OUTPATIENT
Start: 2021-11-16

## 2021-11-16 RX ORDER — HEPARIN SODIUM 5000 [USP'U]/ML
5000 INJECTION, SOLUTION INTRAVENOUS; SUBCUTANEOUS 2 TIMES DAILY
Status: DISCONTINUED | OUTPATIENT
Start: 2021-11-16 | End: 2021-11-24 | Stop reason: HOSPADM

## 2021-11-16 RX ORDER — ASPIRIN 81 MG/1
81 TABLET, CHEWABLE ORAL DAILY
Status: DISCONTINUED | OUTPATIENT
Start: 2021-11-17 | End: 2021-11-24 | Stop reason: HOSPADM

## 2021-11-16 RX ORDER — SODIUM CHLORIDE 0.9 % (FLUSH) 0.9 %
5-40 SYRINGE (ML) INJECTION PRN
Status: DISCONTINUED | OUTPATIENT
Start: 2021-11-16 | End: 2021-11-24 | Stop reason: HOSPADM

## 2021-11-16 RX ORDER — ASPIRIN 81 MG/1
81 TABLET, CHEWABLE ORAL DAILY
Status: CANCELLED | OUTPATIENT
Start: 2021-11-16

## 2021-11-16 RX ORDER — PREDNISONE 10 MG/1
10 TABLET ORAL DAILY
Status: CANCELLED | OUTPATIENT
Start: 2021-11-22 | End: 2021-11-24

## 2021-11-16 RX ORDER — POLYETHYLENE GLYCOL 3350 17 G/17G
17 POWDER, FOR SOLUTION ORAL DAILY PRN
Status: DISCONTINUED | OUTPATIENT
Start: 2021-11-16 | End: 2021-11-24 | Stop reason: HOSPADM

## 2021-11-16 RX ORDER — CEFDINIR 300 MG/1
300 CAPSULE ORAL 2 TIMES DAILY
Status: CANCELLED | OUTPATIENT
Start: 2021-11-16

## 2021-11-16 RX ORDER — PREDNISONE 20 MG/1
20 TABLET ORAL DAILY
Status: COMPLETED | OUTPATIENT
Start: 2021-11-20 | End: 2021-11-21

## 2021-11-16 RX ADMIN — PREDNISONE 40 MG: 20 TABLET ORAL at 08:05

## 2021-11-16 RX ADMIN — SODIUM CHLORIDE, PRESERVATIVE FREE 10 ML: 5 INJECTION INTRAVENOUS at 08:12

## 2021-11-16 RX ADMIN — HEPARIN SODIUM 5000 UNITS: 5000 INJECTION INTRAVENOUS; SUBCUTANEOUS at 19:56

## 2021-11-16 RX ADMIN — SODIUM CHLORIDE, PRESERVATIVE FREE 10 ML: 5 INJECTION INTRAVENOUS at 19:54

## 2021-11-16 RX ADMIN — PANTOPRAZOLE SODIUM 40 MG: 40 TABLET, DELAYED RELEASE ORAL at 06:34

## 2021-11-16 RX ADMIN — LATANOPROST 1 DROP: 50 SOLUTION OPHTHALMIC at 19:52

## 2021-11-16 RX ADMIN — DORZOLAMIDE HYDROCHLORIDE AND TIMOLOL MALEATE 1 DROP: 20; 5 SOLUTION/ DROPS OPHTHALMIC at 08:05

## 2021-11-16 RX ADMIN — LEVOTHYROXINE SODIUM 75 MCG: 0.07 TABLET ORAL at 06:34

## 2021-11-16 RX ADMIN — ASPIRIN 81 MG: 81 TABLET, CHEWABLE ORAL at 08:04

## 2021-11-16 RX ADMIN — METHOCARBAMOL 1000 MG: 500 TABLET ORAL at 12:35

## 2021-11-16 RX ADMIN — DORZOLAMIDE HYDROCHLORIDE AND TIMOLOL MALEATE 1 DROP: 20; 5 SOLUTION/ DROPS OPHTHALMIC at 19:59

## 2021-11-16 RX ADMIN — AMLODIPINE BESYLATE 5 MG: 5 TABLET ORAL at 08:05

## 2021-11-16 RX ADMIN — METHOCARBAMOL 1000 MG: 500 TABLET ORAL at 08:05

## 2021-11-16 RX ADMIN — LISINOPRIL 20 MG: 10 TABLET ORAL at 08:04

## 2021-11-16 RX ADMIN — ATORVASTATIN CALCIUM 10 MG: 10 TABLET, FILM COATED ORAL at 08:04

## 2021-11-16 RX ADMIN — SENNOSIDES AND DOCUSATE SODIUM 1 TABLET: 50; 8.6 TABLET ORAL at 19:54

## 2021-11-16 RX ADMIN — CIPROFLOXACIN 500 MG: 500 TABLET, FILM COATED ORAL at 19:55

## 2021-11-16 RX ADMIN — CEFTRIAXONE 1000 MG: 1 INJECTION, POWDER, FOR SOLUTION INTRAMUSCULAR; INTRAVENOUS at 10:47

## 2021-11-16 RX ADMIN — HEPARIN SODIUM 5000 UNITS: 5000 INJECTION INTRAVENOUS; SUBCUTANEOUS at 08:05

## 2021-11-16 RX ADMIN — CLOBETASOL PROPIONATE: 0.5 CREAM TOPICAL at 08:06

## 2021-11-16 ASSESSMENT — PAIN SCALES - GENERAL
PAINLEVEL_OUTOF10: 0
PAINLEVEL_OUTOF10: 0

## 2021-11-16 NOTE — PLAN OF CARE
Problem: Falls - Risk of:  Goal: Will remain free from falls  Description: Will remain free from falls  11/16/2021 0750 by Jagjit Doyle  Outcome: Ongoing     Problem: Falls - Risk of:  Goal: Absence of physical injury  Description: Absence of physical injury  11/16/2021 0750 by Jagjit Doyle  Outcome: Ongoing     Problem: Pain:  Goal: Pain level will decrease  Description: Pain level will decrease  11/16/2021 0750 by Jagjit Doyle  Outcome: Ongoing     Problem: Pain:  Goal: Control of acute pain  Description: Control of acute pain  11/16/2021 0750 by Jagjit Doyle  Outcome: Ongoing     Problem: Pain:  Goal: Control of chronic pain  Description: Control of chronic pain  11/16/2021 0750 by Jagjit Doyle  Outcome: Ongoing

## 2021-11-16 NOTE — H&P
Department of Physical Medicine & Rehabilitation  History & Physical      Patient Identification:  Cherelle Bowen  : 1929  Admit date: 2021   Attending provider: Miley Day MD        Primary care provider: Ilsa Hwang MD     Chief Complaint: low back and left hip pain     History of Present Illness/Hospital Course:  Patient is a 79 yo F with pmh HTN, HLD, TIA, OA s/p left VITO who initially presented 2021 with low back and left hip pain. Symptoms started ~1 week ago with no clear inciting event. She has had persistent difficulty with ambulation and progressive weakness/fatigue which prompted her to present to ED. She was found to have klebsiella/proteus UTI. CT left hip negative for acute abnormality or hardware complications. CT lumbar spine showed severe multilevel degenerative changes with no significant canal stenosis. She was seen by Ortho. She is being treated with steroid taper. Now presents to ARU with impaired mobility and self-care below her baseline. Currently, patient reports some improvement in her low back and left hip pain. Pain is localized to the left low back/upper buttock and lateral left hip. Described as sharp. Worse with movement and weight bearing. Improves with rest. She denies tingling/numbness or focal weakness.  She is motivated to start inpatient rehab program.      Prior Level of Function:  Independent for mobility with cane, ADLs     Current Level of Function:  SBA-modA for mobility and ADLs     Pertinent Social History:  Support: Lives alone  Home set-up: 2 level home with 2 steps to enter, bed/bath upstairs       Past Medical History:   Diagnosis Date    Arthritis     Endometrial thickening on ultrasound     Glaucoma     Hyperlipidemia     Hypertension     Pneumonia     Post-menopausal bleeding     TIA (transient ischemic attack)        Past Surgical History:   Procedure Laterality Date    BREAST SURGERY      left lumpectomy    HIP ARTHROPLASTY Left 08/26/2016    hemiarthroplasty left hip    THYROIDECTOMY, PARTIAL      VARICOSE VEIN SURGERY         No family history on file. Social History     Socioeconomic History    Marital status:      Spouse name: Not on file    Number of children: Not on file    Years of education: Not on file    Highest education level: Not on file   Occupational History    Occupation: retired   Tobacco Use    Smoking status: Never Smoker    Smokeless tobacco: Never Used   Vaping Use    Vaping Use: Never used   Substance and Sexual Activity    Alcohol use: Yes     Comment: occ    Drug use: No    Sexual activity: Not Currently   Other Topics Concern    Not on file   Social History Narrative    Not on file     Social Determinants of Health     Financial Resource Strain:     Difficulty of Paying Living Expenses: Not on file   Food Insecurity:     Worried About [address] in the Last Year: Not on file    [address] in the Last Year: Not on file   Transportation Needs:     Lack of Transportation (Medical): Not on file    Lack of Transportation (Non-Medical):  Not on file   Physical Activity:     Days of Exercise per Week: Not on file    Minutes of Exercise per Session: Not on file   Stress:     Feeling of Stress : Not on file   Social Connections:     Frequency of Communication with Friends and Family: Not on file    Frequency of Social Gatherings with Friends and Family: Not on file    Attends Sabianist Services: Not on file    Active Member of Clubs or Organizations: Not on file    Attends Club or Organization Meetings: Not on file    Marital Status: Not on file   Intimate Partner Violence:     Fear of Current or Ex-Partner: Not on file    Emotionally Abused: Not on file    Physically Abused: Not on file    Sexually Abused: Not on file   Housing Stability:     Unable to Pay for Housing in the Last Year: Not on file    Number of [place] in the Last Year: Not on file    Unstable Housing in the Last Year: Not on file       Allergies   Allergen Reactions    Sulfa Antibiotics Nausea Only         Current Facility-Administered Medications   Medication Dose Route Frequency Provider Last Rate Last Admin    0.9 % sodium chloride infusion  25 mL IntraVENous PRN Adam Echevarria MD        ondansetron (ZOFRAN-ODT) disintegrating tablet 4 mg  4 mg Oral Q8H PRN Adam Echevarria MD        Or    ondansetron [facility]) injection 4 mg  4 mg IntraVENous Q6H PRN Adam Echevarria MD        sodium chloride flush 0.9 % injection 5-40 mL  5-40 mL IntraVENous 2 times per day Adam Echevarria MD        sodium chloride flush 0.9 % injection 5-40 mL  5-40 mL IntraVENous PRN Adam Echevarria MD        acetaminophen (TYLENOL) tablet 650 mg  650 mg Oral Q6H PRN Adam Echevarria MD        magnesium hydroxide (MILK OF MAGNESIA) 400 MG/5ML suspension 30 mL  30 mL Oral Daily PRN Adam Echevarria MD        polyethylene glycol [facility]) packet 17 g  17 g Oral Daily PRN Adam Echevarria MD        sennosides-docusate sodium (SENOKOT-S) 8.6-50 MG tablet 1 tablet  1 tablet Oral BID Adam Echevarria MD        [START ON 11/17/2021] amLODIPine (NORVASC) tablet 5 mg  5 mg Oral Daily Adam Echevarria MD        [START ON 11/17/2021] aspirin chewable tablet 81 mg  81 mg Oral Daily Adam Echevarria MD        [START ON 11/17/2021] atorvastatin (LIPITOR) tablet 10 mg  10 mg Oral Daily Adam Echevarria MD        bisacodyl (DULCOLAX) suppository 10 mg  10 mg Rectal Daily PRN Adam Echevarria MD        cefdinir (OMNICEF) capsule 300 mg  300 mg Oral BID Adam Echevarria MD        clobetasol (TEMOVATE) 0.05 % cream   Topical BID Adam Echevarria MD        dorzolamide-timolol (COSOPT) 22.3-6.8 MG/ML ophthalmic solution 1 drop  1 drop Both Eyes BID Adam Echevarria MD        heparin (porcine) injection 5,000 Units  5,000 Units SubCUTAneous BID Adam Echevarria MD       [facility] ON 11/17/2021] lactobacillus (CULTURELLE) capsule 1 capsule  1 capsule Oral Daily with breakfast Precious Reeder MD        latanoprost (XALATAN) 0.005 % ophthalmic solution 1 drop  1 drop Both Eyes Nightly Precious Reeder MD        [START ON 11/17/2021] levothyroxine (SYNTHROID) tablet 75 mcg  75 mcg Oral Daily Precious Reeder MD        [START ON 11/17/2021] lisinopril (PRINIVIL;ZESTRIL) tablet 20 mg  20 mg Oral Daily Precious Reeder MD        melatonin tablet 3 mg  3 mg Oral Nightly PRN Precious Reeder MD        methocarbamol (ROBAXIN) tablet 750 mg  750 mg Oral 4x Daily PRN Precious Reeder MD        Netarsudil Dimesylate 0.02 % SOLN 1 drop  1 drop Ophthalmic Nightly Precious Reeder MD        [START ON 11/17/2021] pantoprazole (PROTONIX) tablet 40 mg  40 mg Oral QAM AC Precious Reeder MD        Holley Mask ON 11/17/2021] predniSONE (DELTASONE) tablet 40 mg  40 mg Oral Daily Precious Reeder MD        Followed by   Poly Delgado ON 11/18/2021] predniSONE (DELTASONE) tablet 30 mg  30 mg Oral Daily Precious Reeder MD        Followed by   Poly Delgado ON 11/20/2021] predniSONE (DELTASONE) tablet 20 mg  20 mg Oral Daily Precious Reeder MD        Followed by   Poly Delgado ON 11/22/2021] predniSONE (DELTASONE) tablet 10 mg  10 mg Oral Daily Precious Reeder MD             REVIEW OF SYSTEMS:   CONSTITUTIONAL: negative for fevers, chills, diaphoresis, appetite change, night sweats, unexpected weight change, + fatigue  EYES: negative for blurred vision, eye discharge, visual disturbance and icterus  HEENT: negative for hearing loss, tinnitus, ear drainage, sinus pressure, nasal congestion, epistaxis and snoring  RESPIRATORY: Negative for hemoptysis, cough, sputum production  CARDIOVASCULAR: negative for chest pain, palpitations, exertional chest pressure/discomfort, syncope, edema  GASTROINTESTINAL: negative for nausea, vomiting, diarrhea, blood in stool, abdominal pain, constipation  GENITOURINARY: symmetric. Psych: Stable mood, normal judgement, normal affect     Lab Results   Component Value Date    WBC 8.1 11/16/2021    HGB 10.8 (L) 11/16/2021    HCT 32.6 (L) 11/16/2021    MCV 91.3 11/16/2021     11/16/2021     Lab Results   Component Value Date    INR 0.89 08/26/2016    PROTIME 10.1 08/26/2016     Lab Results   Component Value Date    CREATININE 0.8 11/16/2021    BUN 29 (H) 11/16/2021     11/16/2021    K 3.5 11/16/2021     11/16/2021    CO2 23 11/16/2021     Lab Results   Component Value Date    ALT 7 (L) 11/16/2021    AST 12 (L) 11/16/2021    ALKPHOS 64 11/16/2021    BILITOT <0.2 11/16/2021       Most recent EKG revealed:  Sinus rhythm with frequent and consecutive Premature ventricular complexesLeft axis deviationPoor R wave progressionAbnormal ECGWhen compared with ECG of 09-JUL-2020 16:56,Premature ventricular complexes are now PresentConfirmed by Radha MICHAEL MD ([id number]) on 11/14/2021 2:37:59 PM     Most recent imaging studies revealed:     CT left hip  1. No acute abnormality.  No fracture or findings of prosthetic loosening. No findings to account for the patient's left hip pain   2. 3.1 cm distal abdominal aortic aneurysm.  If clinically indicated,   consider nonemergent CT of the abdomen to accurately measure the entire aorta             CT lumbar spine  No acute lumbar spine abnormality       Severe multilevel degenerative changes without significant canal stenosis         On my review, CXR displays no consolidations or effusions.         The above laboratory data have been reviewed. The above imaging data have been reviewed. The above medical testing have been reviewed. There is no height or weight on file to calculate BMI.     POST ADMISSION PHYSICIAN EVALUATION  The patient has agreed to being admitted to our comprehensive inpatient rehabilitation facility and can tolerate the intensity of service consisting of at least:  --180 minutes of therapy a day, 5 out of 7 days a week. OR  --15 hours of intensive therapy within a 7 consecutive day period. The patient/family has a good understanding of our discharge process and will benefit from an interdisciplinary inpatient rehabilitation program. The patient has potential to make improvement and is in need of at least two of the following multidisciplinary therapies including but not limited to physical, occupational, respiratory, and speech, nutritional services, wound care, and prosthetics and orthotics. Given the patients complex condition and risk of further medical complications, rehabilitation services cannot be safely provided at a lower level of care such as a skilled nursing facility. All of the goals listed below were reviewed with the patient and he/she is in agreement. I have compared the patients medical and functional status at the time of the preadmission screening and the same on this date, and there are no significant changes. By signing this document, I acknowledge that I have personally performed a full physical examination on this patient within 24 hours of admission to this inpatient rehabilitation facility and have determined the patient to be able to tolerate the above course of treatment at an intensive level for a reasonable period of time. I will be completing a detailed individualized  Plan of Care for this patient by day four of the patients stay based upon the Preadmission Screen, this Post-Admission Evaluation, and the therapy evaluations.      Barriers: generalized weakness, decreased spine/hip ROM, balance, endurance, medical comorbidities  Services Required: PT, OT  Goals: [place] for mobility and ADLs  Prognosis: Good  Anticipated Dispo: home alone, prn assist from family  ELOS: 1-2 weeks    Rehabilitation Diagnosis:   Orthopedic, 8.9, Other Orthopedic      Assessment and Plan:    Impairments: generalized weakness, decreased spine/hip ROM, balance, endurance    Lumbar spondylosis and degenerative disc disease  -Prednisone taper  -PT/OT    Debility   -PT/OT to address endurance, strength, compensatory strategies, equipment    Klebsiella/proteus UTI  -Received ceftriaxone on acute floor --> transition to ciprofloxacin based on sensitivities. Hyponatremia  -Possibly low solute/hypovolemic hyponatremia  -Encourage po intake and monitor    HTN  -amlodipine, lisinopril    HLD  -atorvastatin    Hypothyroidism  -levothyroxine    GERD  -pantoprazole    H/o OA s/p left VITO  -CT negative for fracture or hardware malfunction    Bladder   -High risk retention   -Monitor PVRs, SC prn >300cc    Bowel   -High risk constipation   -senna+colace BID, PRN miralax, MoM, and bisacodyl supp. Safety   -fall precautions    Pain control  -acetaminophen and methocarbamol prn    PPx  -DVT: heparin  -GI: pantoprazole    DNR-CCA    Wilian Murray MD 11/16/2021, 5:26 PM

## 2021-11-16 NOTE — PROGRESS NOTES
Pt has been discharged to [facility]. Transport came to  pt.  Pts belongings are with pt.     Yenifer Rondon, Nursing Student

## 2021-11-16 NOTE — PROGRESS NOTES
RN gave report to rehab nurse. Denied any questions at this time. Pt is aware of transfer.     Electronically signed by Felton Borges RN on 11/16/2021 at 1:19 PM

## 2021-11-16 NOTE — PROGRESS NOTES
Hospitalist Progress Note    Patient:  Rosi Durham  Unit/Bed:L3W-0239/5119-01   YOB: 1929       MRN: [id number] Acct: [de-identified]  PCP: Odalys Rousseau MD    Date of Admission: 11/13/2021  --------------------------    Chief Complaint:     Back pain    Hospital Course:     Rosi Durham is a 80 y.o. female hospitalized on 11/13/2021   back pain, hip pain. Assessment/plan:        Acute on chronic low back pain, left hip pain, likely secondary to degenerative disc disease  -Evaluated by orthopedic service, continue supportive care, symptoms resolved. Asymptomatic bacteriuria, culture data reviewed. Patient has no symptoms. Antibiotic discontinued    GERD without esophagitis, continue selected home medication    Hypothyroidism, continue selected home medication    Dyslipidemia  Continue selected home medication                Code Status: DNR-CCA         DVT prophylaxis: Lovenox     Disposition: Patient discharged to inpatient rehab today    I discussed my thought processes at length with patient/family and patient understood. Question and concerns  Addressed           ----------------      Subjective:     Patient seen and examined  Overnight events noted  RN and ancillary staff note reviewed    Extremely pleasant, she has no complaint, back pain improved, she has 0 urinary symptoms. No fever, culture data reviewed. Diet: No diet orders on file    OBJECTIVE     Exam:  /68   Pulse 90   Temp 97.7 °F (36.5 °C) (Oral)   Resp 16   Ht 5' 6\" (1.676 m)   Wt 98 lb 1.7 oz (44.5 kg)   SpO2 93%   BMI 15.83 kg/m²            Gen: Not in distress. Alert. Head: Normocephalic. Atraumatic. Eyes: Conjunctivae/corneas clear. ENT: Oral mucosa moist  Neck: No JVD. No obvious thyromegaly. CVS: Nml S1S2, no murmur  , RRR  Pulmomary: Clear bilaterally. No crackles. No wheezes. Gastrointestinal: Soft, non tender, non distend, . Musculoskeletal: No edema.  Warm  Neuro: No focal deficit. Moves extremity spontaneously. Psychiatry: Appropriate affect. Not agitated. Medications:  Reviewed    Infusion Medications   Scheduled Medications   PRN Meds:       Intake/Output Summary (Last 24 hours) at 11/16/2021 1557  Last data filed at 11/16/2021 1312  Gross per 24 hour   Intake 440 ml   Output --   Net 440 ml             Labs:   Recent Labs     11/14/21  0459 11/15/21  0456 11/16/21  0507   WBC 6.1 8.3 8.1   HGB 12.3 11.2* 10.8*   HCT 37.6 34.2* 32.6*    215 233     Recent Labs     11/14/21  0459 11/15/21  0456 11/16/21  0507    135* 138   K 4.0 4.1 3.5    101 103   CO2 22 22 23   BUN 14 28* 29*   CREATININE 0.7 0.9 0.8   CALCIUM 9.0 8.4 8.4     Recent Labs     11/14/21  0459 11/15/21  0456 11/16/21  0507   AST 10* 11* 12*   ALT <5* <5* 7*   BILITOT 0.3 <0.2 <0.2   ALKPHOS 88 76 64     No results for input(s): INR in the last 72 hours. No results for input(s): [facility] in the last 72 hours. Urinalysis:      Lab Results   Component Value Date    NITRU POSITIVE 11/13/2021    WBCUA 36 11/13/2021    BACTERIA 4+ 11/13/2021    RBCUA 46 11/13/2021    BLOODU LARGE 11/13/2021    SPECGRAV 1.013 11/13/2021    GLUCOSEU Negative 11/13/2021       Radiology:  CT HIP LEFT WO CONTRAST   Final Result   1. No acute abnormality. No fracture or findings of prosthetic loosening. No findings to account for the patient's left hip pain   2. 3.1 cm distal abdominal aortic aneurysm. If clinically indicated,   consider nonemergent CT of the abdomen to accurately measure the entire aorta         CT LUMBAR SPINE WO CONTRAST   Final Result   No acute lumbar spine abnormality      Severe multilevel degenerative changes without significant canal stenosis         XR CHEST PORTABLE   Final Result   No acute process.                      Electronically signed by Mani Aviles MD on 11/16/2021 at 3:57 PM

## 2021-11-16 NOTE — PROGRESS NOTES
Patient admitted to room 3268 per wc. Transferred to bed with  Stand pivot to bed. Patient was oriented to the Call Light, Phone, TV, Thermostat, Bed Controls, Bathroom and Emergency Cord. Patient verbalized and demonstrated understanding of all. Patient was also given an over view of Unit Routines for Acute Rehab, including what to wear for therapy. The patient's role in goal setting was reviewed along with an explanation of the Interdisciplinary Team meeting, the 's role in coordinating services and the Discharge Planning/Continuum of Care process. Patient Rights and Responsibilities were reviewed. Meal times were explained, including how to order food. The white board (used for communication) was pointed out emphasizing  the 3 hours/day Therapy Schedule (posted most evenings), the number (and process) for reporting grievances, and the Doctor's, Nurse's, and PCA's names. It was recommended that any family that will be care givers or any care givers the patient has, take part in therapy. There are no set visiting hours, and it was suggested that non-caregiver friends and family visitors come after therapy (at 4 PM or later) to allow patient to rest in between sessions. Reports was here 5 years ago.

## 2021-11-17 LAB
ANION GAP SERPL CALCULATED.3IONS-SCNC: 10 MMOL/L (ref 3–16)
BASOPHILS ABSOLUTE: 0 K/UL (ref 0–0.2)
BASOPHILS RELATIVE PERCENT: 0 %
BUN BLDV-MCNC: 29 MG/DL (ref 7–20)
CALCIUM SERPL-MCNC: 8.2 MG/DL (ref 8.3–10.6)
CHLORIDE BLD-SCNC: 104 MMOL/L (ref 99–110)
CO2: 23 MMOL/L (ref 21–32)
CREAT SERPL-MCNC: 0.6 MG/DL (ref 0.6–1.2)
EOSINOPHILS ABSOLUTE: 0 K/UL (ref 0–0.6)
EOSINOPHILS RELATIVE PERCENT: 0.1 %
GFR AFRICAN AMERICAN: >60
GFR NON-AFRICAN AMERICAN: >60
GLUCOSE BLD-MCNC: 94 MG/DL (ref 70–99)
HCT VFR BLD CALC: 32.3 % (ref 36–48)
HEMOGLOBIN: 10.5 G/DL (ref 12–16)
LYMPHOCYTES ABSOLUTE: 1.9 K/UL (ref 1–5.1)
LYMPHOCYTES RELATIVE PERCENT: 22.7 %
MCH RBC QN AUTO: 29.5 PG (ref 26–34)
MCHC RBC AUTO-ENTMCNC: 32.5 G/DL (ref 31–36)
MCV RBC AUTO: 91 FL (ref 80–100)
MONOCYTES ABSOLUTE: 1 K/UL (ref 0–1.3)
MONOCYTES RELATIVE PERCENT: 11.8 %
NEUTROPHILS ABSOLUTE: 5.5 K/UL (ref 1.7–7.7)
NEUTROPHILS RELATIVE PERCENT: 65.4 %
PDW BLD-RTO: 15.2 % (ref 12.4–15.4)
PLATELET # BLD: 253 K/UL (ref 135–450)
PMV BLD AUTO: 7.7 FL (ref 5–10.5)
POTASSIUM REFLEX MAGNESIUM: 3.6 MMOL/L (ref 3.5–5.1)
PREALBUMIN: 16.8 MG/DL (ref 20–40)
RBC # BLD: 3.55 M/UL (ref 4–5.2)
SODIUM BLD-SCNC: 137 MMOL/L (ref 136–145)
WBC # BLD: 8.4 K/UL (ref 4–11)

## 2021-11-17 PROCEDURE — 1280000000 HC REHAB R&B

## 2021-11-17 PROCEDURE — 80048 BASIC METABOLIC PNL TOTAL CA: CPT

## 2021-11-17 PROCEDURE — 97535 SELF CARE MNGMENT TRAINING: CPT

## 2021-11-17 PROCEDURE — 2580000003 HC RX 258: Performed by: PHYSICAL MEDICINE & REHABILITATION

## 2021-11-17 PROCEDURE — 97530 THERAPEUTIC ACTIVITIES: CPT | Performed by: PHYSICAL THERAPIST

## 2021-11-17 PROCEDURE — 97166 OT EVAL MOD COMPLEX 45 MIN: CPT

## 2021-11-17 PROCEDURE — 36415 COLL VENOUS BLD VENIPUNCTURE: CPT

## 2021-11-17 PROCEDURE — 97116 GAIT TRAINING THERAPY: CPT | Performed by: PHYSICAL THERAPIST

## 2021-11-17 PROCEDURE — 6360000002 HC RX W HCPCS: Performed by: PHYSICAL MEDICINE & REHABILITATION

## 2021-11-17 PROCEDURE — 85025 COMPLETE CBC W/AUTO DIFF WBC: CPT

## 2021-11-17 PROCEDURE — 6370000000 HC RX 637 (ALT 250 FOR IP): Performed by: PHYSICAL MEDICINE & REHABILITATION

## 2021-11-17 PROCEDURE — 97110 THERAPEUTIC EXERCISES: CPT | Performed by: PHYSICAL THERAPIST

## 2021-11-17 PROCEDURE — 84134 ASSAY OF PREALBUMIN: CPT

## 2021-11-17 PROCEDURE — 97162 PT EVAL MOD COMPLEX 30 MIN: CPT | Performed by: PHYSICAL THERAPIST

## 2021-11-17 RX ADMIN — AMLODIPINE BESYLATE 5 MG: 5 TABLET ORAL at 08:03

## 2021-11-17 RX ADMIN — SODIUM CHLORIDE, PRESERVATIVE FREE 10 ML: 5 INJECTION INTRAVENOUS at 08:05

## 2021-11-17 RX ADMIN — LATANOPROST 1 DROP: 50 SOLUTION OPHTHALMIC at 20:45

## 2021-11-17 RX ADMIN — SENNOSIDES AND DOCUSATE SODIUM 1 TABLET: 50; 8.6 TABLET ORAL at 08:03

## 2021-11-17 RX ADMIN — LEVOTHYROXINE SODIUM 75 MCG: 0.07 TABLET ORAL at 05:07

## 2021-11-17 RX ADMIN — DORZOLAMIDE HYDROCHLORIDE AND TIMOLOL MALEATE 1 DROP: 20; 5 SOLUTION/ DROPS OPHTHALMIC at 10:43

## 2021-11-17 RX ADMIN — HEPARIN SODIUM 5000 UNITS: 5000 INJECTION INTRAVENOUS; SUBCUTANEOUS at 08:04

## 2021-11-17 RX ADMIN — SODIUM CHLORIDE, PRESERVATIVE FREE 10 ML: 5 INJECTION INTRAVENOUS at 20:39

## 2021-11-17 RX ADMIN — HEPARIN SODIUM 5000 UNITS: 5000 INJECTION INTRAVENOUS; SUBCUTANEOUS at 20:36

## 2021-11-17 RX ADMIN — CIPROFLOXACIN 500 MG: 500 TABLET, FILM COATED ORAL at 08:03

## 2021-11-17 RX ADMIN — CIPROFLOXACIN 500 MG: 500 TABLET, FILM COATED ORAL at 20:36

## 2021-11-17 RX ADMIN — Medication 1 CAPSULE: at 08:03

## 2021-11-17 RX ADMIN — ASPIRIN 81 MG: 81 TABLET, CHEWABLE ORAL at 08:03

## 2021-11-17 RX ADMIN — PREDNISONE 40 MG: 20 TABLET ORAL at 08:02

## 2021-11-17 RX ADMIN — SENNOSIDES AND DOCUSATE SODIUM 1 TABLET: 50; 8.6 TABLET ORAL at 20:36

## 2021-11-17 RX ADMIN — DORZOLAMIDE HYDROCHLORIDE AND TIMOLOL MALEATE 1 DROP: 20; 5 SOLUTION/ DROPS OPHTHALMIC at 20:37

## 2021-11-17 RX ADMIN — LISINOPRIL 20 MG: 20 TABLET ORAL at 08:03

## 2021-11-17 RX ADMIN — ATORVASTATIN CALCIUM 10 MG: 10 TABLET, FILM COATED ORAL at 08:03

## 2021-11-17 RX ADMIN — PANTOPRAZOLE SODIUM 40 MG: 40 TABLET, DELAYED RELEASE ORAL at 05:07

## 2021-11-17 ASSESSMENT — PAIN SCALES - GENERAL
PAINLEVEL_OUTOF10: 0
PAINLEVEL_OUTOF10: 0

## 2021-11-17 NOTE — PROGRESS NOTES
Hemoglobin 10.5 (L) 12.0 - 16.0 g/dL    Hematocrit 32.3 (L) 36.0 - 48.0 %    MCV 91.0 80.0 - 100.0 fL    MCH 29.5 26.0 - 34.0 pg    MCHC 32.5 31.0 - 36.0 g/dL    RDW 15.2 12.4 - 15.4 %    Platelets 955 005 - 363 K/uL    MPV 7.7 5.0 - 10.5 fL    Neutrophils % 65.4 %    Lymphocytes % 22.7 %    Monocytes % 11.8 %    Eosinophils % 0.1 %    Basophils % 0.0 %    Neutrophils Absolute 5.5 1.7 - 7.7 K/uL    Lymphocytes Absolute 1.9 1.0 - 5.1 K/uL    Monocytes Absolute 1.0 0.0 - 1.3 K/uL    Eosinophils Absolute 0.0 0.0 - 0.6 K/uL    Basophils Absolute 0.0 0.0 - 0.2 K/uL       Therapy progress:  PT  Position Activity Restriction  Other position/activity restrictions: reg diet, assume BLT precautions d/t back pain, contact precautions  Objective     Sit to Stand: Contact guard assistance  Stand to sit: Contact guard assistance  Bed to Chair: Contact guard assistance  Device: Rolling Walker  Assistance: Contact guard assistance  Distance: 80' with several turns, 60' x 1  OT     Toilet - Technique: Ambulating  Equipment Used: Grab bars  Toilet Transfers Comments: cues & mild steadying  Assessment        SLP          Body mass index is 16.8 kg/m².     Rehabilitation Diagnosis:   Orthopedic, 8.9, Other Orthopedic        Assessment and Plan:     Impairments: generalized weakness, decreased spine/hip ROM, balance, endurance     Lumbar spondylosis and degenerative disc disease  -Pain improving significantly with prednisone taper  -PT/OT     Debility   -PT/OT to address endurance, strength, compensatory strategies, equipment     Klebsiella/proteus UTI  -Received ceftriaxone on acute floor --> transition to ciprofloxacin based on sensitivities.     Hyponatremia  -Possibly low solute/hypovolemic hyponatremia  -Encourage po intake and monitor -- improved 11/17     HTN  -amlodipine, lisinopril     HLD  -atorvastatin     Hypothyroidism  -levothyroxine     GERD  -pantoprazole     H/o OA s/p left VITO  -CT negative for fracture or hardware malfunction     Bladder   -High risk retention   -Monitor PVRs, SC prn >300cc     Bowel   -High risk constipation   -senna+colace BID, PRN miralax, MoM, and bisacodyl supp.     Safety   -fall precautions     Pain control  -acetaminophen and methocarbamol prn     PPx  -DVT: heparin  -GI: pantoprazole       Rehab Progress: Making progress. Working on functional mobility, balance, compensatory strategies for ADLs, safety. Anticipated Dispo: home alone, prn assist from family  Services/DME: TBD  ELOS: 5-7 days      [address]  Kavita Putnam MD 11/17/2021, 2:59 PM

## 2021-11-17 NOTE — PLAN OF CARE
Problem: Falls - Risk of:  Goal: Will remain free from falls  Description: Will remain free from falls  Outcome: Ongoing   Patient remained free of any falls this shift. Call light in reach at all times. Non skid footwear on. Patient encouraged to call for help when needed. Room free of clutter. Alarms on. Problem: Skin Integrity:  Goal: Will show no infection signs and symptoms  Description: Will show no infection signs and symptoms  Outcome: Ongoing   Patient is able to shift weight without assistance. Reposition every two hours. Skin assessed every shift. No new area of breakdown. Skin warm and dry to touch. Waffle cushion in chair.

## 2021-11-17 NOTE — PROGRESS NOTES
Patient admitted with lumbar spondylosis and degenerated disc disease. Alert/oriented. Transfers using walker, does well. Continent of bowel and bladder. Medications taken whole with thins with no complication. On room air with clear/diminished lungs. In contact isolation for MDRO. Denies pain. Calls appropriately. Safety precautions in place.

## 2021-11-17 NOTE — PROGRESS NOTES
Physical Therapy    Facility/Department: [address] REHAB  Initial Assessment and PM session    NAME: Chuyita Rubin  : 1929  MRN: [id number]    Date of Service: 2021    Discharge Recommendations:  Patient would benefit from continued therapy after discharge, Home with assist PRN, Home with Home health PT, Continue to assess pending progress   PT Equipment Recommendations  Equipment Needed: Yes  Other: patient owns one wheeled walker and uses on her second floor, may need second wheeled walker on first floor    Assessment   Body structures, Functions, Activity limitations: Decreased functional mobility ; Decreased ADL status; Decreased strength; Decreased balance; Decreased endurance; Increased pain; Decreased posture; Decreased safe awareness  Assessment: The patient is a 80 y.o. female with  pmh HTN, HLD, TIA, OA s/p left VITO who presents to [facility] on 21 with left lower back and hip pain. She was found to have klebsiella/proteus UTI. CT left hip negative for acute abnormality or hardware complications. CT lumbar spine showed severe multilevel degenerative changes with no significant canal stenosis. She was seen by Ortho. She is being treated with steroid taper. Prior to admission, pt living alone in house setting with bedroom/bath on 2nd level of home, using [facility] for ambulation on first floor, snd floor uses wheeled walker  and rollator for outside. Patientindependent with ADLs, assist from family for homemaking. Pt currently functioning below baseline, requiring CGA for all mobility and the use of a RW for ambulation, Patient unsteady with SPC with CGA/minimal assist. Patient limited by generalized weakness back pain occasionally, decrease balance and limited endurance Pt will benefit from ARU for continued skilled therapy 5-7x/week to maximize independence and safety with functional mobility.   Treatment Diagnosis: impaired mobility  Specific instructions for Next Treatment: work on endurance  Prognosis: Good  REQUIRES PT FOLLOW UP: Yes  Activity Tolerance  Activity Tolerance: Patient Tolerated treatment well       Patient Diagnosis(es): There were no encounter diagnoses. has a past medical history of Arthritis, Endometrial thickening on ultrasound, Glaucoma, Hyperlipidemia, Hypertension, Pneumonia, Post-menopausal bleeding, and TIA (transient ischemic attack). has a past surgical history that includes Thyroidectomy, partial; Varicose vein surgery; Breast surgery; and Hip Arthroplasty (Left, 08/26/2016). Restrictions  Restrictions/Precautions  Restrictions/Precautions: Fall Risk (High)  Position Activity Restriction  Other position/activity restrictions: reg diet, assume BLT precautions d/t back pain, contact precautions- MDRO  Vision/Hearing  Vision: Impaired  Vision Exceptions: Wears glasses for reading (glaucoma)  Hearing: Within functional limits     Subjective  General  Chart Reviewed: Yes  Patient assessed for rehabilitation services?: Yes  Additional Pertinent Hx: Per Dr. Arambula Billing is a 81 yo F with pmh HTN, HLD, TIA, OA s/p left VITO who initially presented 11/13/2021 with low back and left hip pain. Symptoms started ~1 week ago with no clear inciting event. She has had persistent difficulty with ambulation and progressive weakness/fatigue which prompted her to present to ED. She was found to have klebsiella/proteus UTI. CT left hip negative for acute abnormality or hardware complications. CT lumbar spine showed severe multilevel degenerative changes with no significant canal stenosis. She was seen by Ortho. She is being treated with steroid taper. Now presents to ARU with impaired mobility and self-care below her baseline. Currently, patient reports some improvement in her low back and left hip pain. Pain is localized to the left low back/upper buttock and lateral left hip. Described as sharp. Worse with movement and weight bearing.  Improves with rest. She denies tingling/numbness or focal weakness\"  Response To Previous Treatment: Not applicable  Family / Caregiver Present: No  Referring Practitioner: Dr. Terri Stone  Referral Date : 11/16/21  Diagnosis: lumbar sponylosis  Follows Commands: Within Functional Limits  Subjective  Subjective: Patient is not in any pain at present, patient agreeable to evaluation and treatment. Pain Screening  Patient Currently in Pain: Denies          Orientation  Orientation  Overall Orientation Status: Within Functional Limits (BIMS 15/15)  Social/Functional History  Social/Functional History  Lives With: Alone  Type of Home: House  Home Layout: Two level, 1/2 bath on main level, Bed/Bath upstairs, Laundry in basement  Home Access: Stairs to enter without rails  Entrance Stairs - Number of Steps: 1+1 garage entry to main level  Bathroom Shower/Tub: Walk-in shower, Shower chair with back  [employer]: Standard  Bathroom Equipment: Grab bars in shower, Hand-held shower  Bathroom Accessibility: Walker accessible  Home Equipment: Rolling walker, Cane, 4 wheeled walker, Reacher, Sock aid, Alert Button  ADL Assistance: Independent  Homemaking Assistance: Needs assistance (children perform all.  Pt can do light meal prep)  Ambulation Assistance: Independent (with SPC vs RW in home, rollator outside to get mail)  Transfer Assistance: Independent  Active : No  Patient's  Info: Family  Occupation: Retired  Type of occupation:   Leisure & Hobbies: watch tv, does exercises at home 2xs a wk  IADL Comments: family assists w/ laundry; has 6 children, gets help mostly every other day  Additional Comments: Denies recent falls, sleeps in reg bed    Objective     Observation/Palpation  Observation: Patient's eyes red, IV access R arm, pelvic obliquity with R higher then L , scoliosis lower lumbar/thoracic    AROM RLE (degrees)  RLE AROM: WFL  PROM LLE (degrees)  LLE PROM: [place]  Strength RLE  Strength RLE: Exception  Comment: hip 4/5, knee 5/5, and ankle 4/5  Strength LLE  Strength LLE: Exception  Comment: hip 4-/5, knee 4/5, ankle WFL  Tone RLE  RLE Tone: Normotonic  Tone LLE  LLE Tone: Normotonic  Motor Control  Gross Motor?: WFL  Sensation  Overall Sensation Status: WFL  Bed mobility  Bridging: Supervision (with minimal LB pain)  Rolling to Left: Supervision  Rolling to Right: Supervision  Supine to Sit: Supervision  Sit to Supine: Supervision  Scooting: Supervision     Transfers  Sit to Stand: Contact guard assistance  Stand to sit: Contact guard assistance  Bed to Chair: Contact guard assistance  Car Transfer: Stand by assistance; Contact guard assistance (with wheeled walker)  Ambulation  Ambulation?: Yes  More Ambulation?: Yes  Ambulation 1  Surface: level tile  Device: Rolling Walker  Assistance: Contact guard assistance  Quality of Gait: very narrow GARY, scoliosis LB , R pelvis higher then L pelvis, tends to ER- patient does tend to veer to the R but able to correct  Gait Deviations: Slow Jacque; Decreased step length; Decreased step height  Distance: 80' with several turns, 60' x 1    Stairs/Curb  Stairs?: Yes  Stairs  # Steps : 1  Curbs: 6\"  Device: Rolling walker  Assistance: Contact guard assistance; Minimal assistance  Comment: very minimal assist with wheeled walker     Balance  Posture: Fair  Sitting - Static: Good  Sitting - Dynamic: Good  Standing - Static: Fair; +  Standing - Dynamic: Fair; +  Comments: CGA with wheeled walker     Exercises  Gluteal Sets: 10  Hip Flexion: 20  Knee Long Arc Quad: 10 reps with B LE, weakness noted L LE with extension  Ankle Pumps: 20    Second session  Patient in gown due to contact precations with therapist in gown/glove due to MDRO in urine  S/ Patient reported very minimal back pain, agreeable to therapy despite fatigue. O/ Performed above bolded activities.   Ambulated 61' with several turns with wheeled walker with SBA  Attempt ambulation with SPC 50' with several turns with firm CGA with much slower cody and step length compared to wheeled walker. Patient to room via transport. Second Assessment- Patient steadier with wheeled walker at this time. Patient tolerated session well  Plan   Plan  Times per week: 5-6 x  Times per day: Twice a day  Plan weeks: 1 week  Specific instructions for Next Treatment: work on endurance  Current Treatment Recommendations: Strengthening, Functional Mobility Training, Transfer Training, Balance Training, Gait Training, Stair training, Neuromuscular Re-education, Patient/Caregiver Education & Training, Safety Education & Training, Equipment Evaluation, Education, & procurement, Endurance Training, Home Exercise Program, Pain Management  Safety Devices  Type of devices: Gait belt, All fall risk precautions in place (transport return to room)  Restraints  Initially in place: No    Goals  Short term goals  Time Frame for Short term goals: 5-7 days  Short term goal 1: bed mobility with independent  Short term goal 2: transfers with MI  Short term goal 3: ambulate community distances with MI  Short term goal 4: ascend / descend curb step with wheeled with SBA  Short term goal 5: ascend/descend 12 steps with Mi  Long term goals  Time Frame for Long term goals : STG= LTG  Patient Goals   Patient goals : Patient's goal is to go home and take care of yourself       Therapy Time   Individual Concurrent Group Co-treatment   Time In 0945         Time Out 1030         Minutes 45         Timed Code Treatment Minutes: 30 Minutes    Second Session Therapy Time     Individual Co-treatment   Time In 1515     Time Out 1600     Minutes [facility], PT # [id number]

## 2021-11-17 NOTE — PROGRESS NOTES
Occupational Therapy   Occupational Therapy Initial Assessment  Date: 2021   Patient Name: Queta Bojorquez  MRN: [id number]     : 1929    Date of Service: 2021    Discharge Recommendations:  S Level 1, Home with assist PRN  OT Equipment Recommendations  Other: has all needed DME at home    Assessment   Performance deficits / Impairments: Decreased functional mobility ; Decreased ADL status; Decreased high-level IADLs; Decreased balance; Decreased endurance; Decreased strength; Decreased safe awareness  Assessment: Pt is a 80 y.o. female admitted with Low back pain, left hip pain, debility, UTI. At baseline, pt lives alone and independent ADLs and fxl mobility using cane vs walker. Pt currently functioning below baseline d/t the above deficits, today requiring SBA/CGA fxl transfers/mobility with RW, SBA/CGA toileting, SBA grooming, CGA w/ bathing & dressing,  Pt denies back pain throughout session. pt not functioning at independent level for safe d/c to home alone, and demonstrates need for ongoing skilled OT services on rehab x 5 days to maximize pt's safety and independence. Treatment Diagnosis: impaired ADLs  Prognosis: Good  Decision Making: Medium Complexity  History: see chart  Exam: impaired ADLs, t/f, fxl mob  Assistance / Modification: up to CGA using RW, shower chair, GB for toileting, t/f & ADLs  OT Education: OT Role; Plan of Care; ADL Adaptive Strategies; Transfer Training  Patient Education: educated pt on purpose of OT services, POC; cues & steadying for safe transfers & use of DME for shower level bathing  Barriers to Learning: forgetful  REQUIRES OT FOLLOW UP: Yes  Activity Tolerance  Activity Tolerance: Patient Tolerated treatment well  Activity Tolerance: denied pain during session  Safety Devices  Safety Devices in place: Yes  Type of devices: Call light within reach;  Left in bed; Gait belt; Bed alarm in place           Patient Diagnosis(es): There were no encounter diagnoses. has a past medical history of Arthritis, Endometrial thickening on ultrasound, Glaucoma, Hyperlipidemia, Hypertension, Pneumonia, Post-menopausal bleeding, and TIA (transient ischemic attack). has a past surgical history that includes Thyroidectomy, partial; Varicose vein surgery; Breast surgery; and Hip Arthroplasty (Left, 08/26/2016). Treatment Diagnosis: impaired ADLs      Restrictions  Restrictions/Precautions  Restrictions/Precautions: Fall Risk (High)  Position Activity Restriction  Other position/activity restrictions: reg diet, assume BLT precautions d/t back pain, contact precautions    Subjective   General  Chart Reviewed: Yes  Patient assessed for rehabilitation services?: Yes  Additional Pertinent Hx: Per Marla Banegas MD's H&P : \"The patient is a 80 y.o. female with hx HTN, HLD who presents to [facility] with left lower back and hip pain. Patient states that she has had gradually worsening left lower back and hip pain over the past several days. She had a hip replacement several years ago. She describes the pain as sharp, 9/10 in severity, with rest making the pain better and weight bearing making the pain worse. She is struggling to ambulate due to the pain. She lives by herself at home, so she was worried and decided to come to the hospital for further evaluation. Denies fever, chills, chest pain, shortness of breath, abdominal pain, nausea, vomiting, constipation, diarrhea, and dysuria. In the ED, labs were significant for a sodium of 135, chloride of 98, glucose of 106. U/A showed evidence of UTI. CXR showed no acute process. CT Left Hip without contrast showed no acute abnormality, and no fracture or findings of prosthetic loosening with an incidental 3.1 cm distal AAA. CT Lumbar Spine without contrast showed no acute lumbar spine abnormality with severe multilevel degenerative changes without significant canal stenosis. \"  Family / Caregiver Present: No  Referring Practitioner: Dr Shaina Piedra  Diagnosis: Low back pain, left hip pain, debility, UTI  Subjective  Subjective: met in room, pt seated in recliner; declines pain  General Comment  Comments: agreeable for OT eval, shower    Social/Functional History  Social/Functional History  Lives With: Alone  Type of Home: House  Home Layout: Two level, 1/2 bath on main level, Bed/Bath upstairs, Laundry in basement  Home Access: Stairs to enter without rails  Entrance Stairs - Number of Steps: 1+1 garage entry to main level  Bathroom Shower/Tub: Walk-in shower, Shower chair with back  [employer]: Standard  Bathroom Equipment: Grab bars in shower, Hand-held shower  Bathroom Accessibility: Walker accessible  Home Equipment: Rolling walker, Cane, 4 wheeled walker, Reacher, Sock aid, Alert Button  ADL Assistance: Independent  Homemaking Assistance: Needs assistance (children perform all.  Pt can do light meal prep)  Ambulation Assistance: Independent (with SPC vs RW in home, rollator outside to get mail)  Transfer Assistance: Independent  Active : No  Patient's  Info: Family  Occupation: Retired  Type of occupation:   Leisure & Hobbies: watch tv, does exercises at home 2xs a wk  IADL Comments: family assists w/ laundry; has 6 children, gets help mostly every other day  Additional Comments: Denies recent falls, sleeps in reg bed       Objective   Vision: Impaired  Vision Exceptions: Wears glasses for reading (glaucoma)  Hearing: Within functional limits    Orientation  Overall Orientation Status: Within Normal Limits  Orientation Level: Oriented X4  Observation/Palpation  Observation: Patient's eyes red, IV access R arm  Balance  Sitting Balance: Stand by assistance (difficulty reaching feet d/t LBP)  Standing Balance: Stand by assistance  Standing Balance  Time: @ 1-2 minutes  Activity: during ADLs, t/f  Comment: using support of RW, sink, GB  Functional Mobility  Functional - Mobility Device: Rolling Walker  Activity: To/from bathroom  Assist Level: Contact guard assistance  Functional Mobility Comments: close SB to CGA using RW in room & bathrm, completed toilet t/f--mild steadying, limps thru L side (THR 2016, has leg length discrepancies, had lift in shoe), scoliosis of spine noted  Toilet Transfers  Toilet - Technique: Ambulating  Equipment Used: Grab bars  Toilet Transfer: Contact guard assistance; Stand by assistance  Toilet Transfers Comments: cues & mild steadying  Shower Transfers  Shower - Transfer From: Straight Up English - Transfer Type: To  Shower - Transfer To: Shower seat with back  Shower - Technique: Ambulating  Shower Transfers: Contact Guard  Shower Transfers Comments: pt can be impulsive, cues & steadying to transition hands from RW to GB, side stepped shower chair<w/c to exit shower w/ GB  Wheelchair Bed Transfers  Wheelchair/Bed - Technique: Ambulating  Equipment Used: Bed; Other; Wheelchair  Level of Asssistance: Contact guard assistance  Wheelchair Transfers Comments: close SB to CGA using RW  ADL  Feeding: Independent  Grooming: Stand by assistance (sat to wash face, comb hair; staff set up her denture care earlier w/ cues)  UE Bathing: Setup (while seated on shower chair, OT managed water on & adjusted temp.  covered IV site R UE)  LE Bathing: Contact guard assistance (while seated on shower chair, crossed legs to wash & dry feet; stood up impulsively to wash brittaney area, hand to GB; unsteadiness in standing to dry off brittaney area)  UE Dressing: Verbal cueing; Stand by assistance; Setup (while seated able to don bra w/ cue to untwist strap R shld, set up to don shirt while seated)  LE Dressing: Contact guard assistance (gave steadying support during clothing management over hips & buttocks; crossed legs to doff/don socks; denied pain)  Toileting: Stand by assistance (provided w/ close SBA during clothing management, wipes seated)  Additional Comments: overall did well w/ mild steadying & cues for safety, tends to move impulsively  Tone RUE  RUE Tone: Normotonic  Tone LUE  LUE Tone: Normotonic  Coordination  Movements Are Fluid And Coordinated: Yes     Bed mobility  Sit to Supine: Supervision  Scooting: Supervision  Comment: flat bed, used rail prn  Transfers  Sit to stand: Stand by assistance  Stand to sit: Contact guard assistance  Transfer Comments: to/from RW     Cognition  Overall Cognitive Status: WNL  Cognition Comment: cooperative, follows commands but is a little impulsive, forgetful        Sensation  Overall Sensation Status: WNL        LUE AROM (degrees)  LUE AROM : WFL  Left Hand AROM (degrees)  Left Hand AROM: WFL  Left Hand General AROM: arthritis in digits  RUE AROM (degrees)  RUE AROM : WFL  Right Hand AROM (degrees)  Right Hand AROM: WFL  Right Hand General AROM: arthritis in digits                      Plan   Plan  Times per week: 5-6  Times per day: Twice a day  Plan weeks: 5-7 days  Specific instructions for Next Treatment: car t/f  Current Treatment Recommendations: Functional Mobility Training, Balance Training, Strengthening, Endurance Training, Self-Care / ADL, Safety Education & Training, Equipment Evaluation, Education, & procurement, Home Management Training         Goals  Short term goals  Time Frame for Short term goals: by 5 days pt will complete  Short term goal 1: Grooming while standing at sink [employer] term goal 2: UB dressing IND  Short term goal 3: LB dressing IND (loan A/E if indicated)  Short term goal 4: toilet t/f IND'ly using RW  Short term goal 5: toilet tasks IND'ly  Long term goals  Time Frame for Long term goals : by 5-7 days pt will complete  Long term goal 1: shower level bathing w/ MI using shower chair  Long term goal 2: improve standing tolerance x 8-10 minutes during simple meal prep/IADLs  Patient Goals   Patient goals : \"be able to move around a little better\"       Therapy Time   Individual Concurrent Group Co-treatment   Time In 1030         Time Out 1200         Minutes 90         Timed Code Treatment Minutes: [facility], [place] #[id number]

## 2021-11-17 NOTE — PROGRESS NOTES
Patient admitted to rehab with lumbar spondylosis and degenerative disc disease. A/Ox3, confused about date. Transfers with GB and walker with CGA x1. Mobility restrictions: none. On regluar diet, tolerating well. Medications taken whole with thins. On SQ heparin for DVT prophylaxis. Skin: scattered bruising and blanchable redness on mid spine and coccyx. Oxygen: on RA. LDA: IV RAC. Has been continent of bowel and continent of bladder. LBM 11/17. Chair/bed alarms in use and call light in reach. Will monitor for safety. 4 eyes complete, no wounds noted, preventative mepilex applied to mid spine and sacrum for blanchable redness.

## 2021-11-17 NOTE — PLAN OF CARE
Problem: Falls - Risk of:  Goal: Will remain free from falls  Description: Will remain free from falls  11/17/2021 1157 by Muriel Sotelo RN  Outcome: Ongoing  11/17/2021 0429 by Selam Young RN  Outcome: Ongoing  Goal: Absence of physical injury  Description: Absence of physical injury  Outcome: Ongoing     Problem: Skin Integrity:  Goal: Will show no infection signs and symptoms  Description: Will show no infection signs and symptoms  11/17/2021 1157 by Muriel Sotelo RN  Outcome: Ongoing  11/17/2021 0429 by Selam Young RN  Outcome: Ongoing  Goal: Absence of new skin breakdown  Description: Absence of new skin breakdown  Outcome: Ongoing     Problem: Daily Care:  Goal: Daily care needs are met  Description: Daily care needs are met  Outcome: Ongoing     Problem: Pain:  Goal: Patient's pain/discomfort is manageable  Description: Patient's pain/discomfort is manageable  Outcome: Ongoing     Problem: Discharge Planning:  Goal: Patients continuum of care needs are met  Description: Patients continuum of care needs are met  Outcome: Ongoing     Problem: Urinary Elimination:  Goal: Signs and symptoms of infection will decrease  Description: Signs and symptoms of infection will decrease  Outcome: Ongoing

## 2021-11-17 NOTE — PROGRESS NOTES
Comprehensive Nutrition Assessment    Type and Reason for Visit:  Initial, Positive Nutrition Screen    Nutrition Recommendations/Plan:   Continue on regular diet  Monitor meal intake  Offered ONS, but declined    Nutrition Assessment:  Pt admitted to ARU with debility. PMH includes HTN, HLD< TIA, OA with s/p VITO. Positive nutrition screen indicated wt loss and poor po. Pt states it was when she was not feeling well about a week pta. Pt said her UBW is about 105#, wt given in chart was 110#(not verified) in April. CBW is 101#. Pt states she eating fine now. She takes Boost at home when she is not eating well, but does not feel need for that now. Malnutrition Assessment:  Malnutrition Status:  No malnutrition    Context:  Chronic Illness       Estimated Daily Nutrient Needs:  Energy (kcal):  3585-8082 (30-35 x 46 CBW); Weight Used for Energy Requirements:  Current     Protein (g):  69-83 ( 1.5-1.8 x 46 CBW); Weight Used for Protein Requirements:  Current        Fluid (ml/day):  per provider; Method Used for Fluid Requirements:         Nutrition Related Findings:  BM 11/15, no edema        Current Nutrition Therapies:    ADULT DIET;  Regular    Anthropometric Measures:  · Height: 5' 5\" (165.1 cm)  · Current Body Weight: 100 lb 15.5 oz (45.8 kg)   · Usual Body Weight: 105 lb (47.6 kg)     · Ideal Body Weight: 125 lbs; % Ideal Body Weight     · BMI: 16.8  · Adjusted Body Weight:  ; No Adjustment       · BMI Categories: Underweight (BMI less than 22) age over 72       Nutrition Diagnosis:   · Inadequate oral intake related to inadequate protein-energy intake as evidenced by BMI      Nutrition Interventions:   Food and/or Nutrient Delivery:  Continue Current Diet  Nutrition Education/Counseling:  No recommendation at this time   Coordination of Nutrition Care:  Continue to monitor while inpatient    Goals:  >50% of meals consumed       Nutrition Monitoring and Evaluation:   Behavioral-Environmental Outcomes: None Identified   Food/Nutrient Intake Outcomes:  Food and Nutrient Intake  Physical Signs/Symptoms Outcomes:  Biochemical Data, Fluid Status or Edema, Nutrition Focused Physical Findings, Skin, Weight     Discharge Planning:     Too soon to determine     Electronically signed by Haydee Krueger RD, LD on 11/17/21 at 1:23 PM EST    Contact: [phone]

## 2021-11-17 NOTE — PROGRESS NOTES
4 Eyes Skin Assessment     NAME:  Lisa Ruiz  YOB: 1929  MEDICAL RECORD NUMBER:  [id number]    The patient is being assess for  Admission    I agree that 2 RN's have performed a thorough Head to Toe Skin Assessment on the patient. ALL assessment sites listed below have been assessed. Areas assessed by both nurses:    Head, Face, Ears, Shoulders, Back, Chest, Arms, Elbows, Hands, Sacrum. Buttock, Coccyx, Ischium and Legs. Feet and Heels        Does the Patient have a Wound?  No noted wound(s)       Neal Prevention initiated:  Yes   Wound Care Orders initiated:  N/A    Pressure Injury (Stage 3,4, Unstageable, DTI, NWPT, and Complex wounds) if present place consult order under [de-identified] NA    New and Established Ostomies if present place consult order under : NA      Nurse 1 eSignature: Electronically signed by Cristofer Elizondo RN on 11/17/21 at 2:32 PM EST    **SHARE this note so that the co-signing nurse is able to place an eSignature**    Nurse 2 eSignature: Electronically signed by Maryam Campbell RN on 11/17/21 at 2:34 PM EST

## 2021-11-17 NOTE — PROGRESS NOTES
ARU PATIENT TREATMENT PLAN  [facility]   [address], [place] [address]  [phone]    Celio Haimlton    : 1929  [place] LL[facility] #: [de-identified]  MRN: [id number]   PHYSICIAN:  Eduardo Queen MD    Rehabilitation Diagnosis:    Lumbar spondylosis and degenerative disc disease  -Prednisone taper  -PT/OT     Debility   -PT/OT to address endurance, strength, compensatory strategies, equipment     Klebsiella/proteus UTI  -Received ceftriaxone on acute floor --> transition to ciprofloxacin based on sensitivities.     Hyponatremia  -Possibly low solute/hypovolemic hyponatremia  -Encourage po intake and monitor     HTN  -amlodipine, lisinopril     HLD  -atorvastatin     Hypothyroidism  -levothyroxine     GERD  -pantoprazole     H/o OA s/p left VITO  -CT negative for fracture or hardware malfunction     Bladder   -High risk retention   -Monitor PVRs, SC prn >300cc     Bowel   -High risk constipation   -senna+colace BID, PRN miralax, MoM, and bisacodyl supp.     Safety   -fall precautions     Pain control  -acetaminophen and methocarbamol prn     PPx  -DVT: heparin  -GI: pantoprazole      ADMIT DATE:2021    Patient Goals: Patient's goal is to go home and take care of herself    Admitting Impairments:  generalized weakness, decreased spine/hip ROM, balance, endurance  Barriers: lives alone, generalized weakness, decreased spine/hip ROM, balance, endurance, medical comorbidities  Participation: Patient lives alone in a 2 story home, was independent with self care and mobility, using SPC VS RW in home, rollator in community/to get mail).   She enjoys watching TV and doing exercises 2 times per week at home        CARE PLAN     NURSING:  Celio Hamilton while on this unit will:     [x] Be continent of bowel and bladder     [x] Have an adequate number of bowel movements  [x] Urinate with no urinary retention >300ml in bladder  [] Complete bladder protocol with eddy removal  [x] Maintain O2 SATs at 92%  [x] Have pain managed while on ARU       [] Be pain free by discharge   [x] Have no skin breakdown while on ARU  [] Have improved skin integrity via wound measurements  [] Have no signs/symptoms of infection at the wound site  [x] Be free from injury during hospitalization   [x] Complete education with patient/family with understanding demonstrated for:  [x] Adjustment   [x] Other: Prevention of UTI's  Nursing interventions may include bowel/bladder training, education for medical assistive devices, medication education, O2 saturation management, energy conservation, stress management techniques, fall prevention, alarms protocol, seating and positioning, skin/wound care, pressure relief instruction,dressing changes,  infection protection, DVT prophylaxis, and/or assistance with in room safety with transfers to bed, toilet, wheelchair, shower as well as bathroom activities and hygiene. Patient/caregiver education for:   [x] Disease/sustained injury/management      [x] Medication Use   [] Surgical intervention   [x] Safety   [x] Body mechanics and or joint protection   [x] Health maintenance         PHYSICAL THERAPY:  Goals:                  Short term goals  Time Frame for Short term goals: 5-7 days  Short term goal 1: bed mobility with independent  Short term goal 2: transfers with MI  Short term goal 3: ambulate community distances with MI  Short term goal 4: ascend / descend curb step with wheeled with SBA  Short term goal 5: ascend/descend 12 steps with Mi            Long term goals  Time Frame for Long term goals : STG= LTG  These goals were reviewed with this patient at the time of assessment and Eun Chaparro is in agreement. Plan of Care: Pt to be seen 90   mins per day for 5-6 day/week from 5 days to 1 week.                    Current Treatment Recommendations: Strengthening, Functional Mobility Training, Transfer Training, Balance Training, Gait Training, Stair training, with 2 turns  88/6   Walk 10 feet on Uneven Surfaces  88/6   1 Step  3/4   4 Steps  88/6   12 Steps  88/6   Picking up Object  88/6   Wheel 50 feet with 2 Turns   Type?  na   Wheel 150 feet with 2 Turns   Type?  na          Khanh Kmi will be seen a minimum of 3 hours of therapy per day, a minimum of 5 out of 7 days per week. [] In this rare instance due to the nature of this patient's medical involvement, this patient will be seen 15 hours per week (900 minutes within a 7 day period). Treatments may include therapeutic exercises, gait training, neuromuscular re-ed, transfer training, community reintegration, bed mobility,self care, home mgmt, cognitive training, energy conservation,dysphagia tx, speech/language/communication therapy, group therapy, and patient/family education. In addition, dietician/nutritionist may monitor calorie count as well as intake and collaboratively work with SLP on dietary upgrades. Neuropsychology/Psychology may evaluate and provide necessary support. Medical issues being managed closely and that require 24 hour availability of a physician:   [] Swallowing Precautions  [x] Bowel/Bladder Fx  [] Weight bearing precautions   [] Wound Care    [x] Pain Mgmt   [x] Infection Protection   [x] DVT Prophylaxis   [x] Fall Precautions  [x] Fluid/Electrolyte/Nutrition Balance   [] Voice Protection   [] Respiratory  [] Other:    Medical Prognosis: [x] Good  [] Fair    [] Guarded   Total expected IRF days 5-7  Anticipated discharge destination:    [x] Home Independently     [] Home with supervision    []SNF     [] Other                                           Physician anticipated functional outcomes:  Improve gait, transfers, self care to independent with DME as needed to allow safe return home     IPOC brief synthesis: Patient is a 79 yo F with pmh HTN, HLD, TIA, OA s/p left VITO who initially presented 11/13/2021 with low back and left hip pain.  Symptoms started ~1 week ago with no clear inciting event. She has had persistent difficulty with ambulation and progressive weakness/fatigue which prompted her to present to ED. She was found to have klebsiella/proteus UTI. CT left hip negative for acute abnormality or hardware complications. CT lumbar spine showed severe multilevel degenerative changes with no significant canal stenosis. She was seen by Ortho. She is being treated with steroid taper. Now presents to ARU with impaired mobility and self-care below her baseline. She requires comprehensive inpatient rehab program in order to return to community setting. I have reviewed this initial plan of care and agree with its contents:    Title   Name    Date    Time    Physician: Connie Aguilera.  John Pretty MD 11/18/2021, 10:34 AM    Case Mgmt:  Isrrael Dick [place]     Case Management   [phone]    11/18/2021  10:24 AM      OT: JENNIFER Adams/MANFRED #[id number]    PT:Electronically signed by Zoie Cardenas PT [id number] on 11/17/2021 at 4:23 PM      RN: Waqas Gomez RN, CRRN 11/17/2021 10:09 am    ST:    : Sae Ruiz  11/18/2021  0945    Other:

## 2021-11-17 NOTE — PLAN OF CARE
Problem: Nutrition  Goal: Optimal nutrition therapy  Outcome: Ongoing   Nutrition Problem #1: Inadequate oral intake  Intervention: Food and/or Nutrient Delivery: Continue Current Diet  Nutritional Goals: >50% of meals consumed

## 2021-11-18 LAB
ANION GAP SERPL CALCULATED.3IONS-SCNC: 12 MMOL/L (ref 3–16)
BASOPHILS ABSOLUTE: 0 K/UL (ref 0–0.2)
BASOPHILS RELATIVE PERCENT: 0.1 %
BUN BLDV-MCNC: 25 MG/DL (ref 7–20)
CALCIUM SERPL-MCNC: 8.3 MG/DL (ref 8.3–10.6)
CHLORIDE BLD-SCNC: 102 MMOL/L (ref 99–110)
CO2: 22 MMOL/L (ref 21–32)
CREAT SERPL-MCNC: 0.6 MG/DL (ref 0.6–1.2)
EOSINOPHILS ABSOLUTE: 0 K/UL (ref 0–0.6)
EOSINOPHILS RELATIVE PERCENT: 0.3 %
GFR AFRICAN AMERICAN: >60
GFR NON-AFRICAN AMERICAN: >60
GLUCOSE BLD-MCNC: 82 MG/DL (ref 70–99)
HCT VFR BLD CALC: 34.7 % (ref 36–48)
HEMOGLOBIN: 11.1 G/DL (ref 12–16)
LYMPHOCYTES ABSOLUTE: 2.4 K/UL (ref 1–5.1)
LYMPHOCYTES RELATIVE PERCENT: 32.7 %
MAGNESIUM: 2.1 MG/DL (ref 1.8–2.4)
MCH RBC QN AUTO: 29.4 PG (ref 26–34)
MCHC RBC AUTO-ENTMCNC: 32.1 G/DL (ref 31–36)
MCV RBC AUTO: 91.5 FL (ref 80–100)
MONOCYTES ABSOLUTE: 0.8 K/UL (ref 0–1.3)
MONOCYTES RELATIVE PERCENT: 10.6 %
NEUTROPHILS ABSOLUTE: 4.1 K/UL (ref 1.7–7.7)
NEUTROPHILS RELATIVE PERCENT: 56.3 %
PDW BLD-RTO: 15.2 % (ref 12.4–15.4)
PLATELET # BLD: 246 K/UL (ref 135–450)
PMV BLD AUTO: 7.4 FL (ref 5–10.5)
POTASSIUM REFLEX MAGNESIUM: 3.4 MMOL/L (ref 3.5–5.1)
RBC # BLD: 3.79 M/UL (ref 4–5.2)
SODIUM BLD-SCNC: 136 MMOL/L (ref 136–145)
WBC # BLD: 7.3 K/UL (ref 4–11)

## 2021-11-18 PROCEDURE — 97112 NEUROMUSCULAR REEDUCATION: CPT

## 2021-11-18 PROCEDURE — 80048 BASIC METABOLIC PNL TOTAL CA: CPT

## 2021-11-18 PROCEDURE — 2580000003 HC RX 258: Performed by: PHYSICAL MEDICINE & REHABILITATION

## 2021-11-18 PROCEDURE — 85025 COMPLETE CBC W/AUTO DIFF WBC: CPT

## 2021-11-18 PROCEDURE — 97110 THERAPEUTIC EXERCISES: CPT

## 2021-11-18 PROCEDURE — 97535 SELF CARE MNGMENT TRAINING: CPT

## 2021-11-18 PROCEDURE — 6370000000 HC RX 637 (ALT 250 FOR IP): Performed by: PHYSICAL MEDICINE & REHABILITATION

## 2021-11-18 PROCEDURE — 97530 THERAPEUTIC ACTIVITIES: CPT

## 2021-11-18 PROCEDURE — 97116 GAIT TRAINING THERAPY: CPT

## 2021-11-18 PROCEDURE — 83735 ASSAY OF MAGNESIUM: CPT

## 2021-11-18 PROCEDURE — 36415 COLL VENOUS BLD VENIPUNCTURE: CPT

## 2021-11-18 PROCEDURE — 1280000000 HC REHAB R&B

## 2021-11-18 PROCEDURE — 6360000002 HC RX W HCPCS: Performed by: PHYSICAL MEDICINE & REHABILITATION

## 2021-11-18 RX ORDER — POTASSIUM CHLORIDE 20 MEQ/1
40 TABLET, EXTENDED RELEASE ORAL ONCE
Status: COMPLETED | OUTPATIENT
Start: 2021-11-18 | End: 2021-11-18

## 2021-11-18 RX ADMIN — PREDNISONE 30 MG: 20 TABLET ORAL at 07:45

## 2021-11-18 RX ADMIN — CIPROFLOXACIN 500 MG: 500 TABLET, FILM COATED ORAL at 07:45

## 2021-11-18 RX ADMIN — PANTOPRAZOLE SODIUM 40 MG: 40 TABLET, DELAYED RELEASE ORAL at 06:43

## 2021-11-18 RX ADMIN — POTASSIUM CHLORIDE 40 MEQ: 1500 TABLET, EXTENDED RELEASE ORAL at 12:39

## 2021-11-18 RX ADMIN — SODIUM CHLORIDE, PRESERVATIVE FREE 10 ML: 5 INJECTION INTRAVENOUS at 21:03

## 2021-11-18 RX ADMIN — LEVOTHYROXINE SODIUM 75 MCG: 0.07 TABLET ORAL at 06:43

## 2021-11-18 RX ADMIN — SODIUM CHLORIDE, PRESERVATIVE FREE 10 ML: 5 INJECTION INTRAVENOUS at 07:46

## 2021-11-18 RX ADMIN — HEPARIN SODIUM 5000 UNITS: 5000 INJECTION INTRAVENOUS; SUBCUTANEOUS at 07:46

## 2021-11-18 RX ADMIN — LISINOPRIL 20 MG: 20 TABLET ORAL at 07:44

## 2021-11-18 RX ADMIN — CIPROFLOXACIN 500 MG: 500 TABLET, FILM COATED ORAL at 21:03

## 2021-11-18 RX ADMIN — LATANOPROST 1 DROP: 50 SOLUTION OPHTHALMIC at 21:10

## 2021-11-18 RX ADMIN — SENNOSIDES AND DOCUSATE SODIUM 1 TABLET: 50; 8.6 TABLET ORAL at 07:44

## 2021-11-18 RX ADMIN — AMLODIPINE BESYLATE 5 MG: 5 TABLET ORAL at 07:45

## 2021-11-18 RX ADMIN — Medication 1 CAPSULE: at 07:44

## 2021-11-18 RX ADMIN — DORZOLAMIDE HYDROCHLORIDE AND TIMOLOL MALEATE 1 DROP: 20; 5 SOLUTION/ DROPS OPHTHALMIC at 07:53

## 2021-11-18 RX ADMIN — DORZOLAMIDE HYDROCHLORIDE AND TIMOLOL MALEATE 1 DROP: 20; 5 SOLUTION/ DROPS OPHTHALMIC at 21:02

## 2021-11-18 RX ADMIN — ATORVASTATIN CALCIUM 10 MG: 10 TABLET, FILM COATED ORAL at 07:45

## 2021-11-18 RX ADMIN — ASPIRIN 81 MG: 81 TABLET, CHEWABLE ORAL at 07:44

## 2021-11-18 RX ADMIN — HEPARIN SODIUM 5000 UNITS: 5000 INJECTION INTRAVENOUS; SUBCUTANEOUS at 21:03

## 2021-11-18 ASSESSMENT — PAIN SCALES - GENERAL
PAINLEVEL_OUTOF10: 0
PAINLEVEL_OUTOF10: 0

## 2021-11-18 NOTE — PROGRESS NOTES
Occupational Therapy  Facility/Department: [address] IP REHAB  Daily Treatment Note  NAME: Lauren Meeks  : 1929  MRN: [id number]    Date of Service: 2021    Discharge Recommendations:  S Level 1, Home with assist PRN  OT Equipment Recommendations  Other: has all needed DME at home    Assessment   Performance deficits / Impairments: Decreased functional mobility ; Decreased ADL status; Decreased high-level IADLs; Decreased balance; Decreased endurance; Decreased strength; Decreased safe awareness  Assessment: pt making steady gains, denies pain but experienced back fatigue after standing @ 4 minutes during static activity. Pt shown how to use reacher to grab items off floor and place into basket w/ SBA/cues using RW. Pt completed toilet tasks w/ min A d/t being incont of urine & stool smear in brief. Pt instructed w/ proper hygiene by wiping front to back (pt is currently being tx'ed for UTI). Pt completed sit<supine w/ MI. Cont w/ POC to meet higher level of IND in order to return home  Treatment Diagnosis: impaired ADLs  Prognosis: Good  Decision Making: Medium Complexity  History: see chart  Exam: impaired ADLs, t/f, fxl mob  Assistance / Modification: SBA using RW, min A w/ toileting  OT Education: ADL Adaptive Strategies; Equipment  Patient Education: educated on use of reacher & basket to retrieve items off floor; gave instruction for proper wiping/brittaney hygiene front to back  Barriers to Learning: forgetful  REQUIRES OT FOLLOW UP: Yes  Activity Tolerance  Activity Tolerance: Patient Tolerated treatment well  Activity Tolerance: mild back fatigue after standing @ 4 min  Safety Devices  Safety Devices in place: Yes  Type of devices: Call light within reach; Left in bed; Gait belt; Bed alarm in place     PM session: met in therapy dept, she denies pain. Is agreeable for donning shoes & completing car t/f. Pt required cues & set up to don her shoes, bends over & used finger to fix heel. Denied pain bending over. Emphasized BLT precautions. Pt completed car t/f--completed w/ MI, safety concerns d/t forgetful of safe hand placement, no LOB & able to lift B LEs in/out of car. She practiced simulated shower stall + shower chair transfer; required close SB/CGA for mild steadying & for safe hand placement & positioning of RW when stepping over 4\" threshold. Will need additional practice to master skill. Pt transferred to PT side of gym at end of session. Tx time: 45 min, cont w/ POC Joel Zendejas, OTR/L #[id number]    Patient Diagnosis(es): There were no encounter diagnoses. has a past medical history of Arthritis, Endometrial thickening on ultrasound, Glaucoma, Hyperlipidemia, Hypertension, Pneumonia, Post-menopausal bleeding, and TIA (transient ischemic attack). has a past surgical history that includes Thyroidectomy, partial; Varicose vein surgery; Breast surgery; and Hip Arthroplasty (Left, 08/26/2016). Restrictions  Restrictions/Precautions  Restrictions/Precautions: Fall Risk (High)  Position Activity Restriction  Other position/activity restrictions: reg diet, assume BLT precautions d/t back pain, contact precautions- MDRO  Subjective   General  Chart Reviewed: Yes  Patient assessed for rehabilitation services?: Yes  Additional Pertinent Hx: Per Marvin Sweet MD's H&P : \"The patient is a 80 y.o. female with hx HTN, HLD who presents to [facility] with left lower back and hip pain. Patient states that she has had gradually worsening left lower back and hip pain over the past several days. She had a hip replacement several years ago. She describes the pain as sharp, 9/10 in severity, with rest making the pain better and weight bearing making the pain worse. She is struggling to ambulate due to the pain. She lives by herself at home, so she was worried and decided to come to the hospital for further evaluation.  Denies fever, chills, chest pain, shortness of breath, abdominal pain, nausea, vomiting, constipation, diarrhea, and dysuria. In the ED, labs were significant for a sodium of 135, chloride of 98, glucose of 106. U/A showed evidence of UTI. CXR showed no acute process. CT Left Hip without contrast showed no acute abnormality, and no fracture or findings of prosthetic loosening with an incidental 3.1 cm distal AAA. CT Lumbar Spine without contrast showed no acute lumbar spine abnormality with severe multilevel degenerative changes without significant canal stenosis. \"  Family / Caregiver Present: No  Referring Practitioner: Dr Александр Rust  Diagnosis: Low back pain, left hip pain, debility, UTI  Subjective  Subjective: met in therapy dept, denies pain  General Comment  Comments: agreeable for standing balance, fxl mob using RW, UE strengthening      Orientation  Orientation  Overall Orientation Status: Within Normal Limits  Orientation Level: Oriented X4  Objective    ADL  Grooming: Stand by assistance (cues to wash hands as sink, did not realize stool was on her hands; OT cleaned handles of RW & GB)  Toileting: Minimal assistance (incont of urine & stool smear in brief, provided min A to change; cues for wiping, had stool in vaginal area)  Additional Comments: pt has UTI, shown proper way for wiping front to back using wipe        Balance  Sitting Balance: Stand by assistance (difficulty bending ovre to reach feet)  Standing Balance: Stand by assistance  Standing Balance  Time: @ 4 minutes  Activity: playing Perfection Game  Comment: using support of RW, states she had to sit down, my \"back is giving out\"  Functional Mobility  Functional - Mobility Device: Rolling Walker  Activity: Other  Assist Level: Stand by assistance  Functional Mobility Comments: close SBA using RW & basket in therapy gym to retrieve 4 items off floor; shown how to use reacher to maintain BLT prec--completed tasks w/ SBA/cues, ambulated to/from bathrm  Toilet Transfers  Toilet - Technique: Ambulating  Equipment Used: Radha Lebron bars  Toilet Transfer: Stand by assistance  Toilet Transfers Comments: cues & mild unsteadiness d/t L hip & back weakness  Wheelchair Bed Transfers  Wheelchair/Bed - Technique: Ambulating  Equipment Used: Wheelchair; Bed  Level of Asssistance: Stand by assistance  Wheelchair Transfers Comments: close SB/cues for safe hand placement especially w L hand, RW  Bed mobility  Sit to Supine: Modified independent  Comment: semi reclined hosp bed  Transfers  Sit to stand: Stand by assistance  Stand to sit: Stand by assistance  Transfer Comments: to/from [employer]  Overall Cognitive Status: WNL  Cognition Comment: cooperative, follows commands but is a little impulsive, forgetful                    Type of ROM/Therapeutic Exercise  Type of ROM/Therapeutic Exercise: Free weights  Comment: using 2 lb wts for the following strengthening exercises to improve IND w/ sit<>stand transitions  Exercises  Shoulder Elevation: x 15 shld shrugs  Shoulder Flexion: x 5  Horizontal ABduction: x 10  Horizontal ADduction: x 10  Elbow Flexion: x 15  Elbow Extension: x 15  Supination: x 15  Pronation: x 15  Wrist Flexion: x 20 no wts  Wrist Extension: x 20 no wts  Finger Extension: x 20 no wts  Grasp/Release: 2 sets of 20 using 3 lb gripper  Other: x 10 abdominal squeezes for core strengthening to support balance/back                    Plan   Plan  Times per week: 5-6  Times per day: Twice a day  Plan weeks: 5-7 days  Specific instructions for Next Treatment: car t/f  Current Treatment Recommendations: Functional Mobility Training, Balance Training, Strengthening, Endurance Training, Self-Care / ADL, Safety Education & Training, Equipment Evaluation, Education, & procurement, Home Management Training    Goals  Short term goals  Time Frame for Short term goals: by 5 days pt will complete  Short term goal 1: Grooming while standing at sink [employer] term goal 2: UB dressing IND  Short term goal 3: LB dressing IND (loan A/E if indicated)  Short term goal 4: toilet t/f IND'ly using RW  Short term goal 5: toilet tasks IND'ly  Long term goals  Time Frame for Long term goals : by 5-7 days pt will complete  Long term goal 1: shower level bathing w/ MI using shower chair  Long term goal 2: improve standing tolerance x 8-10 minutes during simple meal prep/IADLs  Patient Goals   Patient goals : \"be able to move around a little better\"       Therapy Time   Individual Concurrent Group PM-treatment   Time In 1115      1430   Time Out 1215      1515   Minutes 60      45   Timed Code Treatment Minutes: [address], OTR/L #[id number]

## 2021-11-18 NOTE — PROGRESS NOTES
Patient transferred to room 3264. Patient in agreement to move.   Gerry Houston made aware and in agreement

## 2021-11-18 NOTE — CARE COORDINATION
Chart Reviewed. Met with patient to introduce social serivces role, initiate discussion regarding DC planning and to inform of weekly Team Conferences. She likes to be called Rahel. SOCIAL WORK ASSESSMENT      GOAL:     To return home. HOME SITUATION:  nahun lives alone, house with 1+1 step to enter. Her bedroom and full bathroom are on the second floor. Her laundry is in the basement. She does have 1/2 bath on the first floor. She is retired. Her family provides assistance to her with cleaning, shopping, laundry. She oversees her own mediset and medications. She is active with DR Evaristo Hargrove for pcp. She likes to fill scripts at [facility] on [address]. Personal Goals:           PRIOR LEVEL OF FUNCTIONING:               PERSONAL CARE:     To return home.;             Drives:   no            Finances:  independept            Meals:  Light meal prep            Grocery Shopping:    family                                           DME CURRENTLY AT HOME:  Cane, wh walker, 4 wh walker, reacher, sock aid, alert button, shower chair, bars in shower and  showerhead. CURRENT HOME CARE/SERVICES:   None currently. Informed her of possible post acute services to continue her progress in home care or out patient services. Provided her with CMS star rated list of agencies for her review. PREFERRED HOME CARE:  To be determined. TEAM CONFERENCE DAY:  Mondays. Informed her of weekly Team Conferences where Team cruz review her progress, goals, DME recommendations and DC date. This worker will return to give this update and plan for DC needs. HITESH informed patient of preferred  time on date of discharge which is between 10 - 12 noon.           [place], [place]     Case Management   [phone]    11/18/2021  4:59 PM

## 2021-11-18 NOTE — PROGRESS NOTES
PHYSICAL THERAPY  Progress Note   Second Session    Patient Name: [facility] Record Number: [id number]    Treatment Diagnosis: impaired mobility      Chart Reviewed: Yes   Restrictions/Precautions: Fall Risk (High) Other position/activity restrictions: reg diet, assume BLT precautions d/t back pain, contact precautions- MDRO   Additional Pertinent Hx: Per Dr. Burr Wes is a 79 yo F with pmh HTN, HLD, TIA, OA s/p left VITO who initially presented 11/13/2021 with low back and left hip pain. Symptoms started ~1 week ago with no clear inciting event. She has had persistent difficulty with ambulation and progressive weakness/fatigue which prompted her to present to ED. She was found to have klebsiella/proteus UTI. CT left hip negative for acute abnormality or hardware complications. CT lumbar spine showed severe multilevel degenerative changes with no significant canal stenosis. She was seen by Ortho. She is being treated with steroid taper. Now presents to ARU with impaired mobility and self-care below her baseline. Currently, patient reports some improvement in her low back and left hip pain. Pain is localized to the left low back/upper buttock and lateral left hip. Described as sharp. Worse with movement and weight bearing. Improves with rest. She denies tingling/numbness or focal weakness\"        Subjective: Pt seated in WC in gym upon arrival, pt reports 5/10 pain in back, nurse notified. Pt is agreeable to therapy. Objective: Treatment begins with pt ambulating 175 ft with RW and SBA before requesting seated rest break. Pt completes several sit<>stand transfers throughout session with CGA-SBA.      Step ups to airex with RW  Step ups to airex with HHA on R   Standing on airex 30 sec CGA   Standing on airex OH arm raises   Balloon hitting with partner on airex ~1 min   Stepping over stick forward and backward with HHA   Tandem walking in hallway 20 ft, one UE support on handrail    Seated

## 2021-11-18 NOTE — PROGRESS NOTES
Department of Physical Medicine & Rehabilitation  Progress Note    Patient Identification:  Judy Kenney  [id number]  : 1929  Admit date: 2021    Chief Complaint: Debility    Subjective:   No acute events overnight. Patient seen this am sitting up in room. She has some fatigue from therapies. Had mild hip pain with activity but overall controlled. Daughter is present at the bedside and asks appropriate questions about rehab plan of care and long-term planning. Had long discussion with patient/daughter regarding rehab goals, functional prognosis, and estimated length of stay. Also discussed options for long-term planning. Patient wishes to remain in her home (does not want to move in with family or to [facility]). Per daughter, family rotates to provide assist 2 days per week but they feel she needs more oversight. We discussed plans for home care at discharge and potential for hiring aide services. I also suggested looking into [place] [facility]. Daughter also tells me patient is very sedentary at baseline and spends most of her days/nights lying on couch. We also discussed alternatives including lift chair/recliner or moving bed to first floor. Labs reviewed. ROS: No f/c, n/v, cp     Objective:  Patient Vitals for the past 24 hrs:   BP Temp Temp src Pulse Resp SpO2 Weight   21 0744 (!) 144/72 98.3 °F (36.8 °C) Oral 85 16 97 % --   21 0454 (!) 158/79 -- -- 75 -- 96 % --   21 0454 (!) 123/93 98 °F (36.7 °C) Oral 93 17 97 % 100 lb 5 oz (45.5 kg)   21 2045 129/75 98.1 °F (36.7 °C) Oral 82 18 96 % --   21 1508 118/75 98.5 °F (36.9 °C) Oral 92 16 95 % --     Const: Alert. No distress, pleasant. HEENT: Normocephalic, atraumatic. Normal sclera/conjunctiva. MMM. CV: Regular rate and rhythm. Resp: No respiratory distress. Lungs CTAB. Abd: Soft, nontender, nondistended, NABS+   Ext: No edema. MSK: +Kyphoscoliosis, decreased spine and hip ROM.    Neuro: Alert, oriented, appropriately interactive. Psych: Cooperative, appropriate mood and affect    Laboratory data: Available via EMR.    Last 24 hour lab  Recent Results (from the past 24 hour(s))   CBC auto differential    Collection Time: 11/18/21  7:17 AM   Result Value Ref Range    WBC 7.3 4.0 - 11.0 K/uL    RBC 3.79 (L) 4.00 - 5.20 M/uL    Hemoglobin 11.1 (L) 12.0 - 16.0 g/dL    Hematocrit 34.7 (L) 36.0 - 48.0 %    MCV 91.5 80.0 - 100.0 fL    MCH 29.4 26.0 - 34.0 pg    MCHC 32.1 31.0 - 36.0 g/dL    RDW 15.2 12.4 - 15.4 %    Platelets 722 881 - 121 K/uL    MPV 7.4 5.0 - 10.5 fL    Neutrophils % 56.3 %    Lymphocytes % 32.7 %    Monocytes % 10.6 %    Eosinophils % 0.3 %    Basophils % 0.1 %    Neutrophils Absolute 4.1 1.7 - 7.7 K/uL    Lymphocytes Absolute 2.4 1.0 - 5.1 K/uL    Monocytes Absolute 0.8 0.0 - 1.3 K/uL    Eosinophils Absolute 0.0 0.0 - 0.6 K/uL    Basophils Absolute 0.0 0.0 - 0.2 K/uL   Basic Metabolic Panel w/ Reflex to MG    Collection Time: 11/18/21  7:17 AM   Result Value Ref Range    Sodium 136 136 - 145 mmol/L    Potassium reflex Magnesium 3.4 (L) 3.5 - 5.1 mmol/L    Chloride 102 99 - 110 mmol/L    CO2 22 21 - 32 mmol/L    Anion Gap 12 3 - 16    Glucose 82 70 - 99 mg/dL    BUN 25 (H) 7 - 20 mg/dL    CREATININE 0.6 0.6 - 1.2 mg/dL    GFR Non-African American >60 >60    GFR African American >60 >60    Calcium 8.3 8.3 - 10.6 mg/dL   Magnesium    Collection Time: 11/18/21  7:17 AM   Result Value Ref Range    Magnesium 2.10 1.80 - 2.40 mg/dL       Therapy progress:  PT  Position Activity Restriction  Other position/activity restrictions: reg diet, assume BLT precautions d/t back pain, contact precautions- MDRO  Objective     Sit to Stand: Contact guard assistance  Stand to sit: Contact guard assistance  Bed to Chair: Contact guard assistance  Device: Rolling Walker  Assistance: Stand by assistance, Contact guard assistance (Close SBA, CGA only during turns)  Distance: 186' over level surface and dorsills with 2 turns  OT  PT Equipment Recommendations  Equipment Needed: Yes  Other: patient owns one wheeled walker and uses on her second floor, may need second wheeled walker on first floor  Toilet - Technique: Ambulating  Equipment Used: Grab bars  Toilet Transfers Comments: cues & mild steadying  Assessment        SLP          Body mass index is 16.69 kg/m². Rehabilitation Diagnosis:   Orthopedic, 8.9, Other Orthopedic        Assessment and Plan:     Impairments: generalized weakness, decreased spine/hip ROM, balance, endurance     Lumbar spondylosis and degenerative disc disease  -Pain improving significantly with prednisone taper  -PT/OT     Debility   -PT/OT to address endurance, strength, compensatory strategies, equipment     Klebsiella/proteus UTI  -Received ceftriaxone on acute floor --> transition to ciprofloxacin based on sensitivities.     Hyponatremia  -Possibly low solute/hypovolemic hyponatremia  -Encourage po intake and monitor -- improving    Hypokalemia  -Replace     HTN  -amlodipine, lisinopril     HLD  -atorvastatin     Hypothyroidism  -levothyroxine     GERD  -pantoprazole     H/o OA s/p left VITO  -CT negative for fracture or hardware malfunction     Bladder   -High risk retention   -Monitor PVRs, SC prn >300cc     Bowel   -High risk constipation   -senna+colace BID, PRN miralax, MoM, and bisacodyl supp.     Safety   -fall precautions     Pain control  -acetaminophen and methocarbamol prn     PPx  -DVT: heparin  -GI: pantoprazole       Rehab Progress: Making progress. Working on functional mobility, balance, compensatory strategies for ADLs, safety. Anticipated Dispo: home alone, family checks in 2 days/week, looking into aide services  Services:  PT, OT, RN  DME: TBD  ELOS: 5-7 days      [address] MD 11/18/2021, 10:35 AM

## 2021-11-18 NOTE — PLAN OF CARE
Problem: Falls - Risk of:  Goal: Will remain free from falls  Description: Will remain free from falls  11/18/2021 1059 by Davey Frederick RN  Outcome: Ongoing  Note: Pt is at risk for falls. Call light in reach. Bed in low position. Alarm on. Nonskid footwear on. Possessions in reach. Gait belt with rolling walker for ambulation. Continue therapy     Problem: Urinary Elimination:  Goal: Signs and symptoms of infection will decrease  Description: Signs and symptoms of infection will decrease  11/18/2021 1059 by Davey Frederick RN  Outcome: Ongoing  Note: Encourage fluids. Monitor urine for hematuria, color and smell. Monitor flank pain, frequency or burning. Cipro as ordered.

## 2021-11-18 NOTE — PLAN OF CARE
Problem: Falls - Risk of:  Goal: Will remain free from falls  Description: Will remain free from falls  11/17/2021 2220 by Do Skaggs RN  Outcome: Ongoing  Note: Pt remains free from falls and accidental injury at this time. Fall precautions in place, bed alarm activated, orange blanket on bed, SAFE sign in doorway, fall risk band on pt. Floor free from obstacles. Pt encouraged to call before getting out of bed for any needs, verbalizes understanding. Using call light appropriately. Will continue to monitor. Problem: Skin Integrity:  Goal: Will show no infection signs and symptoms  Description: Will show no infection signs and symptoms  11/17/2021 2220 by Do Skaggs RN  Outcome: Ongoing    Problem: Daily Care:  Goal: Daily care needs are met  Description: Daily care needs are met  11/17/2021 2220 by Do Skaggs RN  Outcome: Ongoing    Problem: Pain:  Goal: Patient's pain/discomfort is manageable  Description: Patient's pain/discomfort is manageable  11/17/2021 2220 by Do Skaggs RN  Outcome: Ongoing  Note: Patient denies any pain at this time. Will continue to monitor. Problem: Urinary Elimination:  Goal: Signs and symptoms of infection will decrease  Description: Signs and symptoms of infection will decrease  11/17/2021 2220 by Do Skaggs RN  Outcome: Ongoing  Note: Continue on oral antibiotic for MDRO in urine. Patient denies having any pain/discomfort or increased frequency when urinating. Fluids encouraged,will continue to monitor.

## 2021-11-18 NOTE — PROGRESS NOTES
Physical Therapy  Facility/Department: [address] REHAB  Daily Treatment Note  NAME: Christine Ohara  : 1929  MRN: [id number]    Date of Service: 2021    Discharge Recommendations:  Continue to assess pending progress, Patient would benefit from continued therapy after discharge, Home with assist PRN   PT Equipment Recommendations  Equipment Needed: Yes  Other: patient owns one wheeled walker and uses on her second floor, may need second wheeled walker on first floor    Assessment   Body structures, Functions, Activity limitations: Decreased functional mobility ; Decreased ADL status; Decreased strength; Decreased balance; Decreased endurance; Increased pain; Decreased posture; Decreased safe awareness  Assessment: Ms. Aiden Contreras tolerated treatment well this AM as indicated by ability to tolerate ambulating community distances CGA-SBA and perform more exercises in standing. She requires some minimal verbal cueing during ambulation and transfers for positioning. Pt limited by L hip and low back pain during standing exercises, and limited by weakness and decreased activity tolerance during functional mobility. She is currently functioning below baseline and would benefit from continued skilled PT to promote independence and safety with functional mobility at [facility]  Treatment Diagnosis: impaired mobility  Prognosis: Good  PT Education: PT Role; Plan of Care; Functional Mobility Training; General Safety; Gait Training; Transfer Training  Activity Tolerance  Activity Tolerance: Patient Tolerated treatment well; Patient limited by fatigue     Patient Diagnosis(es): There were no encounter diagnoses. has a past medical history of Arthritis, Endometrial thickening on ultrasound, Glaucoma, Hyperlipidemia, Hypertension, Pneumonia, Post-menopausal bleeding, and TIA (transient ischemic attack).    has a past surgical history that includes Thyroidectomy, partial; Varicose vein surgery; Breast surgery; and Hip Arthroplasty (Left, 08/26/2016). Social/Functional History  Lives With: Alone  Type of Home: House  Home Layout: Two level, 1/2 bath on main level, Bed/Bath upstairs, Laundry in basement  Home Access: Stairs to enter without rails  Entrance Stairs - Number of Steps: 1+1 garage entry to main level  Bathroom Shower/Tub: Walk-in shower, Shower chair with back  [employer]: Standard  Bathroom Equipment: Grab bars in shower, Hand-held shower  Bathroom Accessibility: Walker accessible  Home Equipment: Rolling walker, Cane, 4 wheeled walker, Reacher, Sock aid, Alert Button  ADL Assistance: [address]: Needs assistance (children perform all. Pt can do light meal prep)  Ambulation Assistance: Independent (with SPC vs RW in home, rollator outside to get mail)  Transfer Assistance: Independent  Active : No  Patient's  Info: Family  Occupation: Retired  Type of occupation:   Leisure & Hobbies: watch tv, does exercises at home 2xs a wk  IADL Comments: family assists w/ laundry; has 6 children, gets help mostly every other day  Additional Comments: Denies recent falls, sleeps in reg bed    Restrictions  Restrictions/Precautions  Restrictions/Precautions: Fall Risk (High)  Position Activity Restriction  Other position/activity restrictions: reg diet, assume BLT precautions d/t back pain, contact precautions- MDRO  Subjective   General  Chart Reviewed: Yes  Additional Pertinent Hx: Per Dr. Reyes Rose is a 81 yo F with pmh HTN, HLD, TIA, OA s/p left VITO who initially presented 11/13/2021 with low back and left hip pain. Symptoms started ~1 week ago with no clear inciting event. She has had persistent difficulty with ambulation and progressive weakness/fatigue which prompted her to present to ED. She was found to have klebsiella/proteus UTI. CT left hip negative for acute abnormality or hardware complications.  CT lumbar spine showed severe multilevel degenerative changes with no significant canal stenosis. She was seen by Ortho. She is being treated with steroid taper. Now presents to ARU with impaired mobility and self-care below her baseline. Currently, patient reports some improvement in her low back and left hip pain. Pain is localized to the left low back/upper buttock and lateral left hip. Described as sharp. Worse with movement and weight bearing. Improves with rest. She denies tingling/numbness or focal weakness\"  Response To Previous Treatment: Patient reporting fatigue but able to participate. Family / Caregiver Present: No  Referring Practitioner: Dr. Samuel Fountain: Pt denies pain and reports feeling \"good\" this AM. Agreeable to session. General Comment  Comments: Pt seated in w/c in therapy gym          Objective   Bed mobility  Bridging: Stand by assistance (Cues neeeded.  Reported back pain with bridging)  Supine to Sit: Supervision (Long-sit and then swung legs to EOB)  Sit to Supine: Modified independent  Scooting: Stand by assistance (Cues needed to use UE/LE)  Comment: bed mobility performed on flat mat table  Transfers  Sit to Stand: Stand by assistance (VC for safety throughout but tolerates well)  Stand to sit: Stand by assistance  Ambulation  Ambulation?: Yes  Ambulation 1  Surface: level tile  Device: Rolling Walker  Assistance: Stand by assistance; Contact guard assistance (Close SBA, CGA only during turns)  Quality of Gait: very narrow GARY,  tends to ER, patient does tend to veer to the R but is aware of this and able to correct  Gait Deviations: Slow Jacque; Decreased step length; Decreased step height; Deviated path  Distance: 186' over level surface and dorsills with 2 turns  Comments: Verbal cues for staying inside RW        Exercises  Hip Flexion: alt marching x20  Hip Extension/Leg Presses: x 10 BLE  Hip Abduction: x10 LLE; supine x 15 BLE; adduction sets x 20 (Pt unable to complete on R due to pain standing on LLE)  Ankle Pumps: x20 heel raises  Comments: Standing in parallel bars SBA and supine on mat table  Other exercises  Other exercises?: Yes  Other exercises 1: Side stepping in parallel bars CGA x 4 each direction  Other exercises 2: Static balance: x 30 sec feet shoulder width apart + EO; x 30 sec NBOS +EO; x30 sec NBOS + EC; x 30 sec tandem +EO each side with HH support (Balance activies performed standing at RW with HH support ONLY for tandem stance. CGA-Min A due to lateral sway with EC)  Other exercises 3: Supine lumbar rotation x 1 min        Goals  Short term goals  Time Frame for Short term goals: 5-7 days  Short term goal 1: bed mobility with independent  Short term goal 2: transfers with MI  Short term goal 3: ambulate community distances with MI  Short term goal 4: ascend / descend curb step with wheeled with SBA  Short term goal 5: ascend/descend 12 steps with Mi  Long term goals  Time Frame for Long term goals : STG= LTG  Patient Goals   Patient goals : Patient's goal is to go home and take care of yourself    Plan    Plan  Times per week: 5-6 x  Times per day: Twice a day  Plan weeks: 1 week  Specific instructions for Next Treatment: work on endurance  Current Treatment Recommendations: Strengthening, Functional Mobility Training, Transfer Training, Balance Training, Gait Training, Stair training, Neuromuscular Re-education, Patient/Caregiver Education & Training, Safety Education & Training, Equipment Evaluation, Education, & procurement, Endurance Training, Home Exercise Program, Pain Management  Safety Devices  Type of devices:  All fall risk precautions in place, Gait belt, Patient at risk for falls, Left in chair (Pt left in w/c in therapy gym awaiting transportation back to room)  Restraints  Initially in place: No     Therapy Time   Individual Concurrent Group Co-treatment   Time In 0815         Time Out 0900         Minutes 45         Timed Code Treatment Minutes: 45 Minutes       Electronically signed by Beverly Rascon Miguel Trujillo on 11/18/21 at 9:26 AM EST  Therapist was present, directed the patient's care, made skilled judgement, and was responsible for assessment and treatment of the patient.          Electronically signed by Silvia Walker PT on 11/18/21 at 2:35 PM EST

## 2021-11-19 PROCEDURE — 97110 THERAPEUTIC EXERCISES: CPT

## 2021-11-19 PROCEDURE — 1280000000 HC REHAB R&B

## 2021-11-19 PROCEDURE — 6370000000 HC RX 637 (ALT 250 FOR IP): Performed by: PHYSICAL MEDICINE & REHABILITATION

## 2021-11-19 PROCEDURE — 94761 N-INVAS EAR/PLS OXIMETRY MLT: CPT

## 2021-11-19 PROCEDURE — 2580000003 HC RX 258: Performed by: PHYSICAL MEDICINE & REHABILITATION

## 2021-11-19 PROCEDURE — 97116 GAIT TRAINING THERAPY: CPT

## 2021-11-19 PROCEDURE — 97530 THERAPEUTIC ACTIVITIES: CPT | Performed by: PHYSICAL THERAPIST

## 2021-11-19 PROCEDURE — 6360000002 HC RX W HCPCS: Performed by: PHYSICAL MEDICINE & REHABILITATION

## 2021-11-19 PROCEDURE — 97530 THERAPEUTIC ACTIVITIES: CPT

## 2021-11-19 PROCEDURE — 97535 SELF CARE MNGMENT TRAINING: CPT

## 2021-11-19 PROCEDURE — 97116 GAIT TRAINING THERAPY: CPT | Performed by: PHYSICAL THERAPIST

## 2021-11-19 RX ADMIN — DORZOLAMIDE HYDROCHLORIDE AND TIMOLOL MALEATE 1 DROP: 20; 5 SOLUTION/ DROPS OPHTHALMIC at 08:03

## 2021-11-19 RX ADMIN — PANTOPRAZOLE SODIUM 40 MG: 40 TABLET, DELAYED RELEASE ORAL at 06:10

## 2021-11-19 RX ADMIN — CIPROFLOXACIN 500 MG: 500 TABLET, FILM COATED ORAL at 21:14

## 2021-11-19 RX ADMIN — SODIUM CHLORIDE, PRESERVATIVE FREE 10 ML: 5 INJECTION INTRAVENOUS at 08:04

## 2021-11-19 RX ADMIN — ATORVASTATIN CALCIUM 10 MG: 10 TABLET, FILM COATED ORAL at 07:58

## 2021-11-19 RX ADMIN — LATANOPROST 1 DROP: 50 SOLUTION OPHTHALMIC at 21:18

## 2021-11-19 RX ADMIN — DORZOLAMIDE HYDROCHLORIDE AND TIMOLOL MALEATE 1 DROP: 20; 5 SOLUTION/ DROPS OPHTHALMIC at 20:58

## 2021-11-19 RX ADMIN — LEVOTHYROXINE SODIUM 75 MCG: 0.07 TABLET ORAL at 06:10

## 2021-11-19 RX ADMIN — ASPIRIN 81 MG: 81 TABLET, CHEWABLE ORAL at 07:57

## 2021-11-19 RX ADMIN — CIPROFLOXACIN 500 MG: 500 TABLET, FILM COATED ORAL at 07:58

## 2021-11-19 RX ADMIN — HEPARIN SODIUM 5000 UNITS: 5000 INJECTION INTRAVENOUS; SUBCUTANEOUS at 07:58

## 2021-11-19 RX ADMIN — Medication 1 CAPSULE: at 07:57

## 2021-11-19 RX ADMIN — ACETAMINOPHEN 650 MG: 325 TABLET ORAL at 15:04

## 2021-11-19 RX ADMIN — SENNOSIDES AND DOCUSATE SODIUM 1 TABLET: 50; 8.6 TABLET ORAL at 07:58

## 2021-11-19 RX ADMIN — HEPARIN SODIUM 5000 UNITS: 5000 INJECTION INTRAVENOUS; SUBCUTANEOUS at 21:14

## 2021-11-19 RX ADMIN — AMLODIPINE BESYLATE 5 MG: 5 TABLET ORAL at 08:03

## 2021-11-19 RX ADMIN — LISINOPRIL 20 MG: 20 TABLET ORAL at 07:58

## 2021-11-19 RX ADMIN — PREDNISONE 30 MG: 20 TABLET ORAL at 07:57

## 2021-11-19 ASSESSMENT — PAIN DESCRIPTION - DESCRIPTORS: DESCRIPTORS: ACHING;DISCOMFORT

## 2021-11-19 ASSESSMENT — PAIN SCALES - GENERAL
PAINLEVEL_OUTOF10: 2
PAINLEVEL_OUTOF10: 0
PAINLEVEL_OUTOF10: 0
PAINLEVEL_OUTOF10: 6

## 2021-11-19 ASSESSMENT — PAIN DESCRIPTION - PROGRESSION: CLINICAL_PROGRESSION: NOT CHANGED

## 2021-11-19 ASSESSMENT — PAIN DESCRIPTION - ONSET: ONSET: GRADUAL

## 2021-11-19 ASSESSMENT — PAIN DESCRIPTION - ORIENTATION: ORIENTATION: LEFT;LOWER

## 2021-11-19 ASSESSMENT — PAIN DESCRIPTION - FREQUENCY: FREQUENCY: INTERMITTENT

## 2021-11-19 ASSESSMENT — PAIN - FUNCTIONAL ASSESSMENT: PAIN_FUNCTIONAL_ASSESSMENT: ACTIVITIES ARE NOT PREVENTED

## 2021-11-19 ASSESSMENT — PAIN DESCRIPTION - PAIN TYPE: TYPE: ACUTE PAIN

## 2021-11-19 ASSESSMENT — PAIN DESCRIPTION - LOCATION: LOCATION: HIP

## 2021-11-19 NOTE — PATIENT CARE CONFERENCE
[facility]  Inpatient Rehabilitation  Weekly Team Conference Note      Date: 2021  Patient Name:  Lisa Ruiz    MRN: [id number]  : 1929  Gender: Female   Physician: Dr Dao Hall   Diagnosis: Debility [R53.81]    CASE MANAGEMENT  Assessment:    Goal is home       PHYSICAL THERAPY    Bed mobility  Bridging: Modified independent   Rolling to Left: Modified independent  Rolling to Right: Modified independent  Supine to Sit: Modified independent  Sit to Supine: Modified independent  Scooting: Modified independent  Comment: semi reclined hosp bed    Transfers:  Sit to Stand: Stand by assistance  Stand to sit: Stand by assistance  Bed to Chair: Contact guard assistance  Comment: sit<>stand multiple times throughout session from different surfaces    Ambulation 1  Surface: level tile  Device: Rolling Walker  Assistance: Stand by assistance  Quality of Gait: Narrow GARY. decreased stride length, improved gait speed this date. step through pattern  Gait Deviations: Decreased step length, Decreased step height  Distance: 190' over level surface and dorsills with several turns; short distances in therapy gym  Comments: Verbal cues for staying inside RW           Stairs  # Steps : 4  Stairs Height: 6\"  Rails: Bilateral (Non-reciprocal pattern ascending and descending)  Curbs: 6\"  Device: Rolling walker  Assistance: Contact guard assistance  Comment: No cues needed for safety or sequencing    Car Transfer: Stand by assistance (amb up to mock car passenger side with RW all SBA)      Assessment: Ms. Eloy Sage tolerated treatment well this AM as indicated by ability to tolerate ambulating community distances SBA. She required decreased cueing for sequencing and positioning. She is limited by pain, weakness, and decreased activity tolerance during functional mobility.  She is currently functioning below baseline and would benefit from continued skilled PT to promote independence and safety with functional mobility at [facility]. SPEECH THERAPY (intentionally left blank if not actively being seen by this service):      OCCUPATIONAL THERAPY    ADL  Feeding: Independent  Grooming: Independent (stood to wash hands, complete oral care, she combed hair IND'ly)  UE Bathing: Setup (while seated on shower chair, OT managed water & temp; she was IND w/ wash, rinse & drying w/ set up)  LE Bathing: Stand by assistance (while seated on shower chair she needed set up & cues to ensure thoroughness of brittaney hygiene & drying off feet, also had stool @ R wrist & on band--cues to clean)  UE Dressing: Independent (extra time to don her bra, hooks in front twists it to back; able to don shirt while seated)  LE Dressing: Stand by assistance, Verbal cueing (cues when doffing clothes as stool smear in brief, and on hands (OT wiped clean), she is able to don her clothing while seated at sink w/ extra time, set up for shoes)  Toileting: Setup, Increased time to complete, Stand by assistance (pt had stool smear in brief, on hands & rail (OT cleaned), she was able to manage clothing down & up & complete brittaney care w/ setup & cues)  Additional Comments: pt has UTI, shown proper way for wiping front to back using wipe, had stool smears in vaginal area--VCs + wipes & extra time to clean up    Bed mobility  Bridging: Modified independent   Rolling to Left: Modified independent  Rolling to Right: Modified independent  Supine to Sit: Modified independent  Sit to Supine: Modified independent  Scooting: Modified independent  Comment: semi reclined hosp bed    Functional Transfers: Toilet Transfers  Toilet - Technique: Ambulating  Equipment Used: Grab bars  Toilet Transfer: Stand by assistance  Toilet Transfers Comments: cues & mild unsteadiness d/t L hip & back weakness     Shower Transfers  Shower - Transfer From: Muna Stauffer - Transfer Type: To  Shower - Transfer To:  Shower seat with back  Shower - Technique: Ambulating  Shower Transfers: Contact Guard  Shower Transfers Comments: close SB/CGA to use RW then transition hands to GB, moves impulsively during SPT shower chair<w/c              UE Function:  WFLs    Assessment: pt has met 2/5 goals w/ OT intervention. She is able to complete room mobility using RW to closet & bathrm w/ cues to stand closer to objects to reduce fall risk; tends to limp thru L LE. Pt is IND w/ standing at sink to wash hands & complete oral care. She is able to doff/don bra & shirt while seated IND'ly,needed cues for safety during LB dressing & toileting --had stool smeared in brief, pants, on hands &rail (OT assisted w/ cleaning these items). Recommend pt cont OT services on rehab thru early next wk (Tues) to improve IND w/ ADLs & t/f to return home alone w/ family support, no DME needs indicated. Recommend home OT services             NUTRITION  Most recent weightWeight: 100 lb 5 oz (45.5 kg)  BSA (Calculated - sq m): 1.45 sq meters  BMI (Calculated): 16.7  Diet Order: ADULT DIET; Regular  PO Meals Eaten (%): 1 - 25%        NURSING  Continent of bladder, can have smearing of stoool, redness noted back and buttocks. Family Education: Patient Education: medications, pain control, skin care and prevention, safety and fall prevention, education to prevent UTIs, proper brittaney care after bms, education contact isolation, MDRO. MEDICAL  We will plan discharge to home on Wednesday with continued PT, OT, and home health aide at discharge, and also set her up with a rolling walker at discharge. Repeat labs this morning look good and are stable.     TEAM SUMMARY AND DISCHARGE PLAN  Estimated Length of Stay: DC 11/24/21  Destination: home with home care and support from children/family who live in the area   · Anticipated Services at Discharge:    [x] OT  [x] PT   [] SLP    [x] RN   [x] Home Health aide [] FARIDA  Community Resources: _______________________________  Equipment recommendations:  [] Hospital bed [] Tub bench  [] Shower chair [] Hand held shower  [] Raised toilet seat [] Toilet safety frame [] Bedside commode   [] W/C: _____  [x] Rolling Walker [] Standard walker [] Gait belt [] cane: _________  [] Sliding board [] Alternate seating/furniture [] O2 [] Hip Kit: _______  [] Life Line [] Other: _______  Factors facilitating achievement of predicted outcomes: motivated in therapy, eager to return home, supportive family  Barriers to the achievement of predicted outcomes/Interventions:   Lives alone, limps thru L LE d/t hip & back pain (prev THR)-strengthening, compensatory strategies for safe self care and mobility, fall prevention strategies       Interdisciplinary Individualized Plan of Care Review:    · Continue Current Plan of Care: Yes    · Modifications:_____________________________    Special Needs in the Upcoming Week :    [] Family/Caregiver Education  [] Home visit  []Therapeutic Pass   [] Consults:_______    [] Other;_______    Patient Rehab Team Goals for the Upcoming Week:  1. Transfers mod-I across all disciplines. 2. MI w/ toilet tasks & ADLs  3. Patient goals : Patient's goal is to go home and take care of herself          Team Members Present at Conference:  Physician:  Dr Nohemi Lara   : [facility]    Occupational Therapist: Hyacinth Tuttle, OTR/L #[id number]  Physical Therapist: Charisse Orellana PT, DPT [id number]   Speech Therapist:   Nurse: Destiny Guzman RN, CRRN  Dietician: Librado Loyd, RD, LD   Bailee Dewey, [facility]     I led this team conference and I approve the established interdisciplinary plan of care as documented within the medical record of [address] [place] [address].     MD: Dr. Nohemi Lara  11/22/2021  7:41 AM

## 2021-11-19 NOTE — PROGRESS NOTES
Resting well overnight. Pt admitted with back pain, found to have stenosis. Is Oriented x4. Transferring with a walker x1, CGA. Tolerating well. Lungs CTA, HR regular. Abdomen non tender with active bowel sounds. Has been continent of bladder. Denied pain. All needs assessed with Q2H rounding, alarms active. Will monitor.  Kyrie Thacker RN

## 2021-11-19 NOTE — PLAN OF CARE
Problem: Falls - Risk of:  Goal: Will remain free from falls  Description: Will remain free from falls  11/19/2021 1030 by Shekhar Maciel RN  Outcome: Ongoing  Note: Pt is at risk for falls. Call light in reach. Bed in low position. Alarm on. Nonskid footwear on. Possessions in reach. Ambulates with walker with front wheels and gait belt for ambualation     Problem: Urinary Elimination:  Goal: Signs and symptoms of infection will decrease  Description: Signs and symptoms of infection will decrease  11/19/2021 1030 by Shekhar Maciel RN  Outcome: Ongoing  Note: Encourage fluids. Proper brittaney care. Monitor urine for hematuria, odor or increase frequency with voiding. Continue to monitor vitals. Cipro as ordered.

## 2021-11-19 NOTE — PROGRESS NOTES
Patient admitted to rehab with lumbar spondylosis and degenerative disc disease. Patient is alert and oriented and able to make her needs known. Transfers with gait belt and walker one assist.  Mobility restrictions: WBAT. On regular diet, tolerating well. Meications taken whole with thin liquids. On heparin for DVT prophylaxis. Scattered bruising. Mepilex to sacrum and mid spine for prevention. On room air, sats 97%. LDA:  IV RAC. Has been continent of bowel and bladder. LBM 11/18. Chair and bed alarms for safety. Call light within reach. Will continue to monitor status and safety.

## 2021-11-19 NOTE — PLAN OF CARE
Problem: Skin Integrity:  Goal: Will show no infection signs and symptoms  Description: Will show no infection signs and symptoms  Outcome: Ongoing  Note: Pt is at risk for impaired skin integrity. Assess skin every shift and prn. Turn every 2 hours. Keep heels off bed. Keep skin clean and dry.

## 2021-11-19 NOTE — PROGRESS NOTES
Physical Therapy  Facility/Department: [address] REHAB  Daily Treatment Note  NAME: Cherelle Bowen  : 1929  MRN: [id number]    Date of Service: 2021    Discharge Recommendations:  Continue to assess pending progress, Patient would benefit from continued therapy after discharge, Home with assist PRN   PT Equipment Recommendations  Other: patient owns one wheeled walker and uses on her second floor, may need second wheeled walker on first floor  Rolling walker     Assessment   Body structures, Functions, Activity limitations: Decreased functional mobility ; Decreased ADL status; Decreased strength; Decreased balance; Decreased endurance; Increased pain; Decreased posture; Decreased safe awareness  Assessment: Ms. Doyle Espinal tolerated treatment well this AM as indicated by ability to tolerate ambulating community distances SBA. She required decreased cueing for sequencing and positioning. She is limited by pain, weakness, and decreased activity tolerance during functional mobility. She is currently functioning below baseline and would benefit from continued skilled PT to promote independence and safety with functional mobility at [facility]. Treatment Diagnosis: impaired mobility  Prognosis: Good  PT Education: PT Role; Plan of Care; Functional Mobility Training; General Safety; Gait Training; Transfer Training  Activity Tolerance  Activity Tolerance: Patient Tolerated treatment well     Patient Diagnosis(es): There were no encounter diagnoses. has a past medical history of Arthritis, Endometrial thickening on ultrasound, Glaucoma, Hyperlipidemia, Hypertension, Pneumonia, Post-menopausal bleeding, and TIA (transient ischemic attack). has a past surgical history that includes Thyroidectomy, partial; Varicose vein surgery; Breast surgery; and Hip Arthroplasty (Left, 2016).     Restrictions  Restrictions/Precautions  Restrictions/Precautions: Fall Risk (High)  Position Activity Restriction  Other Ambulation  Ambulation?: Yes  Ambulation 1  Surface: level tile  Device: Rolling Walker  Assistance: Stand by assistance  Quality of Gait: Narrow GARY. decreased stride length, improved gait speed this date. step through pattern  Gait Deviations: Decreased step length; Decreased step height  Distance: 190' over level surface and dorsills with several turns; short distances in therapy gym  Comments: Verbal cues for staying inside RW  Stairs/Curb  Stairs?: Yes  Stairs  # Steps : 4  Stairs Height: 6\"  Rails: Bilateral (Non-reciprocal pattern ascending and descending)  Curbs: 6\"  Device: Rolling walker  Assistance: Contact guard assistance  Comment: No cues needed for safety or sequencing        Exercises  Hip Flexion: alt marching x30  Hip Abduction: abduction with red TB x 20  Knee Long Arc Quad: alt x 15  Ankle Pumps: x20 heel raises  Comments: Seated in w/c BLE        Goals  Short term goals  Time Frame for Short term goals: 5-7 days  Short term goal 1: bed mobility with independent  Short term goal 2: transfers with MI  Short term goal 3: ambulate community distances with MI  Short term goal 4: ascend / descend curb step with wheeled with SBA  Short term goal 5: ascend/descend 12 steps with Mi  Long term goals  Time Frame for Long term goals : STG= LTG  Patient Goals   Patient goals : Patient's goal is to go home and take care of yourself    Plan    Plan  Times per week: 5-6 x  Times per day: Twice a day  Plan weeks: 1 week  Specific instructions for Next Treatment: work on endurance  Current Treatment Recommendations: Strengthening, Functional Mobility Training, Transfer Training, Balance Training, Gait Training, Stair training, Neuromuscular Re-education, Patient/Caregiver Education & Training, Safety Education & Training, Equipment Evaluation, Education, & procurement, Endurance Training, Home Exercise Program, Pain Management  Safety Devices  Type of devices:  All fall risk precautions in place, Gait belt, Patient at risk for falls, Left in chair (Pt left in w/c in therapy gym awaiting transportation back to room)  Restraints  Initially in place: No     Therapy Time   Individual Concurrent Group Co-treatment   Time In 0900         Time Out 0945         Minutes 45         Timed Code Treatment Minutes: 45 Minutes       Electronically signed by Ady Parra on 11/19/21 at 10:03 AM EST  Therapist was present, directed the patient's care, made skilled judgement, and was responsible for assessment and treatment of the patient.            Electronically signed by Luz Yu PT on 11/19/21 at 1:36 PM EST

## 2021-11-19 NOTE — PROGRESS NOTES
Department of Physical Medicine & Rehabilitation  Progress Note    Patient Identification:  Judy Kenney  [id number]  : 1929  Admit date: 2021    Chief Complaint: Debility    Subjective:   No acute events overnight. Patient seen this am sitting up in gym. She is worried about her family member who is having surgery today. Otherwise no new concerns, states therapy continues to go well. D/w PT, recommending use of rolling walker for now (was using cane on 1st floor at home). Labs reviewed. ROS: No f/c, n/v, cp     Objective:  Patient Vitals for the past 24 hrs:   BP Temp Temp src Pulse Resp SpO2 Weight   21 0758 (!) 161/69 97.1 °F (36.2 °C) Oral 95 16 97 % --   21 0504 (!) 143/74 98.1 °F (36.7 °C) Oral 85 17 97 % 100 lb 5 oz (45.5 kg)   21 1939 114/64 97.8 °F (36.6 °C) Oral 95 18 95 % --   21 1649 107/60 97.7 °F (36.5 °C) Oral 91 17 95 % --     Const: Alert. No distress, pleasant. HEENT: Normocephalic, atraumatic. Normal sclera/conjunctiva. MMM. CV: Regular rate and rhythm. Resp: No respiratory distress. Lungs CTAB. Abd: Soft, nontender, nondistended, NABS+   Ext: No edema. MSK: +Kyphoscoliosis, decreased spine and hip ROM. Neuro: Alert, oriented, appropriately interactive. Psych: Cooperative, appropriate mood and affect    Laboratory data: Available via EMR. Last 24 hour lab  No results found for this or any previous visit (from the past 24 hour(s)).     Therapy progress:  PT  Position Activity Restriction  Other position/activity restrictions: reg diet, assume BLT precautions d/t back pain, contact precautions- MDRO  Objective     Sit to Stand: Stand by assistance  Stand to sit: Stand by assistance  Bed to Chair: Contact guard assistance  Device: Rolling Walker  Assistance: Stand by assistance  Distance: 190' over level surface and dorsills with several turns; short distances in therapy gym  OT  PT Equipment Recommendations  Equipment Needed: Yes  Other: patient owns one wheeled walker and uses on her second floor, may need second wheeled walker on first floor  Toilet - Technique: Ambulating  Equipment Used: Grab bars  Toilet Transfers Comments: cues & mild unsteadiness d/t L hip & back weakness  Assessment        SLP          Body mass index is 16.69 kg/m². Rehabilitation Diagnosis:   Orthopedic, 8.9, Other Orthopedic        Assessment and Plan:     Impairments: generalized weakness, decreased spine/hip ROM, balance, endurance     Lumbar spondylosis and degenerative disc disease  -Pain improving significantly with prednisone taper  -PT/OT     Debility   -PT/OT to address endurance, strength, compensatory strategies, equipment     Klebsiella/proteus UTI  -Received ceftriaxone on acute floor --> transition to ciprofloxacin based on sensitivities.     Hyponatremia  -Possibly low solute/hypovolemic hyponatremia  -Encourage po intake and monitor -- improving    Hypokalemia  -Replaced     HTN  -amlodipine, lisinopril     HLD  -atorvastatin     Hypothyroidism  -levothyroxine     GERD  -pantoprazole     H/o OA s/p left VITO  -CT negative for fracture or hardware malfunction     Bladder   -High risk retention   -Monitor PVRs, SC prn >300cc     Bowel   -High risk constipation   -senna+colace BID, PRN miralax, MoM, and bisacodyl supp.     Safety   -fall precautions     Pain control  -acetaminophen and methocarbamol prn     PPx  -DVT: heparin  -GI: pantoprazole       Rehab Progress: Making progress. Working on functional mobility, balance, compensatory strategies for ADLs, safety. Anticipated Dispo: home alone, family checks in 2 days/week, looking into aide services  Services: [facility] PT, OT, RN  DME: TBD  ELOS: 5-7 days      [address] Ram MD 11/19/2021, 10:10 AM

## 2021-11-19 NOTE — PROGRESS NOTES
Occupational Therapy  Facility/Department: [address] [facility] REHAB  Daily Treatment Note  NAME: Federica Park  : 1929  MRN: [id number]    Date of Service: 2021    Discharge Recommendations:  S Level 1, Home with assist PRN  OT Equipment Recommendations  Other: has all needed DME at home    Assessment   Performance deficits / Impairments: Decreased functional mobility ; Decreased ADL status; Decreased high-level IADLs; Decreased balance; Decreased endurance; Decreased strength; Decreased safe awareness  Assessment: pt has met 2/5 goals w/ OT intervention. She is able to complete room mobility using RW to closet & bathrm w/ cues to stand closer to objects to reduce fall risk; tends to limp thru L LE. Pt is IND w/ standing at sink to wash hands & complete oral care. She is able to doff/don bra & shirt while seated IND'ly,needed cues for safety during LB dressing & toileting --had stool smeared in brief, pants, on hands &rail (OT assisted w/ cleaning these items). Recommend pt cont OT services on rehab thru early next wk to improve IND w/ ADLs & t/f to return home alone w/ family support, no DME needs indicated  Treatment Diagnosis: impaired ADLs  Prognosis: Good  Decision Making: Medium Complexity  History: see chart  Exam: impaired ADLs, t/f, fxl mob  Assistance / Modification: SBA using RW, SBA w/ toileting  OT Education: ADL Adaptive Strategies  Patient Education: gave cues for proper wiping tech front to back  after BM (needed set up & cues to clean up after bowel accident)  Barriers to Learning: forgetful  REQUIRES OT FOLLOW UP: Yes  Activity Tolerance  Activity Tolerance: Patient Tolerated treatment well  Activity Tolerance: mild back fatigue during ADLs & t/f  Safety Devices  Safety Devices in place: Yes  Type of devices: Call light within reach; Gait belt; Chair alarm in place; Nurse notified       PM session: met in therapy dept, she is reporting mild L hip thru back pain (unsure if she had pain meds). Completed 1 set of 10 for each of the 5 strengthening exercises using 2 lb wts. Pt completed fxl mob in therapy gym<>bathrm to complete toilet t/f--completed w/ close SB/CGA when turning to L side--limping/weakness noted (had previous THR). She was SBA using TSF on/off toilet. Pt able to manage clothing down & up however had bowel incont in brief--OT assisted her w/ changing them and provided set up & cues for proper wiping front to back (pt was wiping back to front & stool smeared @ vaginal area). RN aware of 2 bowl incont episodes today; pt seems unaware. Taken back to her room, completed w/c< transfer using RW w/ SBA. Completed sit<supine IND'ly, call light in reacher, bed alarm on. Tx time: 45 min, cont w/ POC Divine Negron, OTR/L #[id number]    Patient Diagnosis(es): There were no encounter diagnoses. has a past medical history of Arthritis, Endometrial thickening on ultrasound, Glaucoma, Hyperlipidemia, Hypertension, Pneumonia, Post-menopausal bleeding, and TIA (transient ischemic attack). has a past surgical history that includes Thyroidectomy, partial; Varicose vein surgery; Breast surgery; and Hip Arthroplasty (Left, 08/26/2016). Restrictions  Restrictions/Precautions  Restrictions/Precautions: Fall Risk (High)  Position Activity Restriction  Other position/activity restrictions: reg diet, assume BLT precautions d/t back pain, contact precautions- MDRO  Subjective   General  Chart Reviewed: Yes  Patient assessed for rehabilitation services?: Yes  Additional Pertinent Hx: Per Candi Babcock MD's H&P : \"The patient is a 80 y.o. female with hx HTN, HLD who presents to [facility] with left lower back and hip pain. Patient states that she has had gradually worsening left lower back and hip pain over the past several days. She had a hip replacement several years ago. She describes the pain as sharp, 9/10 in severity, with rest making the pain better and weight bearing making the pain worse.  She is struggling to ambulate due to the pain. She lives by herself at home, so she was worried and decided to come to the hospital for further evaluation. Denies fever, chills, chest pain, shortness of breath, abdominal pain, nausea, vomiting, constipation, diarrhea, and dysuria. In the ED, labs were significant for a sodium of 135, chloride of 98, glucose of 106. U/A showed evidence of UTI. CXR showed no acute process. CT Left Hip without contrast showed no acute abnormality, and no fracture or findings of prosthetic loosening with an incidental 3.1 cm distal AAA. CT Lumbar Spine without contrast showed no acute lumbar spine abnormality with severe multilevel degenerative changes without significant canal stenosis. \"  Family / Caregiver Present: No  Referring Practitioner: Dr Hal Gay  Diagnosis: Low back pain, left hip pain, debility, UTI  Subjective  Subjective: met in room, resting in bed quietly, no denies pain  General Comment  Comments: agreeable for shower      Orientation  Orientation  Overall Orientation Status: Within Normal Limits  Orientation Level: Oriented X4  Objective    ADL  Feeding: Independent  Grooming: Independent (stood to wash hands, complete oral care, she combed hair IND'ly)  UE Bathing: Setup (while seated on shower chair, OT managed water & temp; she was IND w/ wash, rinse & drying w/ set up)  LE Bathing: Stand by assistance (while seated on shower chair she needed set up & cues to ensure thoroughness of brittaney hygiene & drying off feet, also had stool @ R wrist & on band--cues to clean)  UE Dressing: Independent (extra time to don her bra, hooks in front twists it to back; able to don shirt while seated)  LE Dressing: Stand by assistance; Verbal cueing (cues when doffing clothes as stool smear in brief, and on hands (OT wiped clean), she is able to don her clothing while seated at sink w/ extra time, set up for shoes)  Toileting: Setup;  Increased time to complete; Stand by assistance (pt had stool smear in brief, on hands & rail (OT cleaned), she was able to manage clothing down & up & complete brittaney care w/ setup & cues)  Additional Comments: pt has UTI, shown proper way for wiping front to back using wipe, had stool smears in vaginal area--VCs + wipes & extra time to clean up        Balance  Sitting Balance: Modified independent  (able to cross legs or bend over to reach feet, safety concerns)  Standing Balance: Stand by assistance  Standing Balance  Time: @ 2-3 minutes  Activity: during grooming  Comment: using support of RW, sink or GB (had prior back & hip issues which limited standing tolerance)  Functional Mobility  Functional - Mobility Device: Rolling Walker  Activity: Retrieve items; Transport items; To/from bathroom  Assist Level: Stand by assistance  Functional Mobility Comments: close SBA w/ cues using RW from closet to bathrm, cues to stand closer to objects to reduce fall risk; no LOB but limps thru L LE (had THR & scoliosis which impacts gait, used to wear lift in shoe)  Toilet Transfers  Toilet - Technique: Ambulating  Equipment Used: Grab bars  Toilet Transfer: Stand by assistance  Toilet Transfers Comments: cues & mild unsteadiness d/t L hip & back weakness  Shower Transfers  Shower - Transfer From: Walker  Shower - Transfer Type: To  Shower - Transfer To:  Shower seat with back  Shower - Technique: Ambulating  Shower Transfers: Contact Guard  Shower Transfers Comments: close SB/CGA to use RW then transition hands to GB, moves impulsively during SPT shower chair<w/c  Wheelchair Bed Transfers  Wheelchair/Bed - Technique: Ambulating  Equipment Used: Bed; Wheelchair  Level of Asssistance: Stand by assistance  Wheelchair Transfers Comments: close SB/cues for safe hand placement especially w L hand, RW  Bed mobility  Rolling to Left: Modified independent  Supine to Sit: Modified independent  Sit to Supine: Modified independent  Scooting: Modified independent  Comment: semi reclined hosp bed  Transfers  Sit to stand: Stand by assistance  Stand to sit: Stand by assistance  Transfer Comments: to/from RW                    Vision  Patient Visual Report: Blurring of print when reading  Cognition  Overall Cognitive Status: WNL  Cognition Comment: cooperative, follows commands but is a little impulsive, forgetful; has good sense of humor                                         Plan   Plan  Times per week: 5-6  Times per day: Twice a day  Plan weeks: 5-7 days  Specific instructions for Next Treatment: kitchen tasks  Current Treatment Recommendations: Functional Mobility Training, Balance Training, Strengthening, Endurance Training, Self-Care / ADL, Safety Education & Training, Equipment Evaluation, Education, & procurement, Home Management Training       Goals  Short term goals  Time Frame for Short term goals: by 5 days pt will complete  Short term goal 1: Grooming while standing at sink IND'ly--MET  Short term goal 2: UB dressing IND--MET  Short term goal 3: LB dressing IND (loan A/E if indicated)  Short term goal 4: toilet t/f IND'ly using RW  Short term goal 5: toilet tasks IND'ly  Long term goals  Time Frame for Long term goals : by 5-7 days pt will complete  Long term goal 1: shower level bathing w/ MI using shower chair  Long term goal 2: improve standing tolerance x 8-10 minutes during simple meal prep/IADLs  Patient Goals   Patient goals : \"be able to move around a little better\"       Therapy Time   Individual Concurrent Group PM-treatment   Time In 1105      1430   Time Out 1205     1515   Minutes 60      45   Timed Code Treatment Minutes: [address], OTR/L #[id number]

## 2021-11-19 NOTE — PROGRESS NOTES
Physical Therapy  PHYSICAL THERAPY  Progress Note   Second Session    Patient Name: Faith Porter  Medical Record Number: [id number]    Treatment Diagnosis: impaired mobility      Chart Reviewed: Yes   Restrictions/Precautions: Fall Risk (High) Other position/activity restrictions: reg diet, assume BLT precautions d/t back pain, contact precautions- MDRO   Additional Pertinent Hx: Per Dr. Burr Wes is a 81 yo F with pmh HTN, HLD, TIA, OA s/p left VITO who initially presented 11/13/2021 with low back and left hip pain. Symptoms started ~1 week ago with no clear inciting event. She has had persistent difficulty with ambulation and progressive weakness/fatigue which prompted her to present to ED. She was found to have klebsiella/proteus UTI. CT left hip negative for acute abnormality or hardware complications. CT lumbar spine showed severe multilevel degenerative changes with no significant canal stenosis. She was seen by Ortho. She is being treated with steroid taper. Now presents to ARU with impaired mobility and self-care below her baseline. Currently, patient reports some improvement in her low back and left hip pain. Pain is localized to the left low back/upper buttock and lateral left hip. Described as sharp. Worse with movement and weight bearing. Improves with rest. She denies tingling/numbness or focal weakness\"        Subjective- Patient reports no pain and agreeable to therapy. Objective- Patient retrieved from B/S, sit to stand with SBA, transfer to w/c with SBA. Patient ambulated 220' , 150', 80' with close SBA , one standing rest break with longer distance. This did not affect back pain. Attempt ambulation 48' with SPC and firm CGA unsteady with slower stride then with wheeled walker.   Patient then performed standing toe raises/heel rocks x 10, marches x 20 then need to sit due to fatigue  Second Assessment- patient safer with wheeled walker at this time, need to look into if insurance able to cover wheeled walker on first floor. Assessment: Ms. Valeri Fischer tolerated treatment well this AM as indicated by ability to tolerate ambulating community distances SBA. She required decreased cueing for sequencing and positioning. She is limited by pain, weakness, and decreased activity tolerance during functional mobility. She is currently functioning below baseline and would benefit from continued skilled PT to promote independence and safety with functional mobility at [facility].       Safety Device - Type of devices:  []  All fall risk precautions in place [] Bed alarm in place  [] Call light within reach [] Chair alarm in place [] Positioning belt [] Gait belt [] Patient at risk for falls [] Left in bed [] Left in chair [] Telesitter in use [] Sitter present [] Nurse notified []  None      Therapy Time   Individual Co-treatment   Time In 1300     Time Out 1345     Minutes 45         Electronically signed by Veronika Streeter PT on 11/19/2021 at 3:36 PM

## 2021-11-19 NOTE — PLAN OF CARE
Problem: Falls - Risk of:  Goal: Will remain free from falls  Description: Will remain free from falls  11/19/2021 0051 by Wendy Tariq RN  Outcome: Ongoing  Note: Pt educated on falls precautions and safety. Call light and personal belongings within reach at all times. Non skid socks in use when up. Hourly rounding and alarms active. Problem: Skin Integrity:  Goal: Absence of new skin breakdown  Description: Absence of new skin breakdown  Outcome: Ongoing  Note: Able to change positions in bed without assist, no evidence of skin breakdown noted. Waffle cushion in place to chair.

## 2021-11-20 PROCEDURE — 1280000000 HC REHAB R&B

## 2021-11-20 PROCEDURE — 6370000000 HC RX 637 (ALT 250 FOR IP): Performed by: NURSE PRACTITIONER

## 2021-11-20 PROCEDURE — 97530 THERAPEUTIC ACTIVITIES: CPT

## 2021-11-20 PROCEDURE — 97116 GAIT TRAINING THERAPY: CPT

## 2021-11-20 PROCEDURE — 97110 THERAPEUTIC EXERCISES: CPT

## 2021-11-20 PROCEDURE — 94761 N-INVAS EAR/PLS OXIMETRY MLT: CPT

## 2021-11-20 PROCEDURE — 6360000002 HC RX W HCPCS: Performed by: PHYSICAL MEDICINE & REHABILITATION

## 2021-11-20 PROCEDURE — 6370000000 HC RX 637 (ALT 250 FOR IP): Performed by: PHYSICAL MEDICINE & REHABILITATION

## 2021-11-20 PROCEDURE — 97535 SELF CARE MNGMENT TRAINING: CPT

## 2021-11-20 RX ORDER — FAMOTIDINE 20 MG/1
20 TABLET, FILM COATED ORAL DAILY PRN
Status: DISCONTINUED | OUTPATIENT
Start: 2021-11-20 | End: 2021-11-24 | Stop reason: HOSPADM

## 2021-11-20 RX ORDER — CALCIUM CARBONATE 200(500)MG
500 TABLET,CHEWABLE ORAL 3 TIMES DAILY PRN
Status: DISCONTINUED | OUTPATIENT
Start: 2021-11-20 | End: 2021-11-24 | Stop reason: HOSPADM

## 2021-11-20 RX ORDER — FAMOTIDINE 20 MG/1
20 TABLET, FILM COATED ORAL 2 TIMES DAILY PRN
Status: DISCONTINUED | OUTPATIENT
Start: 2021-11-20 | End: 2021-11-20

## 2021-11-20 RX ADMIN — HEPARIN SODIUM 5000 UNITS: 5000 INJECTION INTRAVENOUS; SUBCUTANEOUS at 07:44

## 2021-11-20 RX ADMIN — CIPROFLOXACIN 500 MG: 500 TABLET, FILM COATED ORAL at 07:44

## 2021-11-20 RX ADMIN — LEVOTHYROXINE SODIUM 75 MCG: 0.07 TABLET ORAL at 06:08

## 2021-11-20 RX ADMIN — LISINOPRIL 20 MG: 20 TABLET ORAL at 07:44

## 2021-11-20 RX ADMIN — Medication 3 MG: at 20:02

## 2021-11-20 RX ADMIN — DORZOLAMIDE HYDROCHLORIDE AND TIMOLOL MALEATE 1 DROP: 20; 5 SOLUTION/ DROPS OPHTHALMIC at 19:52

## 2021-11-20 RX ADMIN — DORZOLAMIDE HYDROCHLORIDE AND TIMOLOL MALEATE 1 DROP: 20; 5 SOLUTION/ DROPS OPHTHALMIC at 07:47

## 2021-11-20 RX ADMIN — ATORVASTATIN CALCIUM 10 MG: 10 TABLET, FILM COATED ORAL at 07:44

## 2021-11-20 RX ADMIN — AMLODIPINE BESYLATE 5 MG: 5 TABLET ORAL at 07:44

## 2021-11-20 RX ADMIN — Medication 1 CAPSULE: at 07:44

## 2021-11-20 RX ADMIN — ANTACID TABLETS 500 MG: 500 TABLET, CHEWABLE ORAL at 15:13

## 2021-11-20 RX ADMIN — HEPARIN SODIUM 5000 UNITS: 5000 INJECTION INTRAVENOUS; SUBCUTANEOUS at 20:02

## 2021-11-20 RX ADMIN — ASPIRIN 81 MG: 81 TABLET, CHEWABLE ORAL at 07:44

## 2021-11-20 RX ADMIN — CIPROFLOXACIN 500 MG: 500 TABLET, FILM COATED ORAL at 20:02

## 2021-11-20 RX ADMIN — ANTACID TABLETS 500 MG: 500 TABLET, CHEWABLE ORAL at 11:34

## 2021-11-20 RX ADMIN — PREDNISONE 20 MG: 20 TABLET ORAL at 07:44

## 2021-11-20 RX ADMIN — LATANOPROST 1 DROP: 50 SOLUTION OPHTHALMIC at 19:58

## 2021-11-20 RX ADMIN — PANTOPRAZOLE SODIUM 40 MG: 40 TABLET, DELAYED RELEASE ORAL at 06:08

## 2021-11-20 ASSESSMENT — PAIN SCALES - GENERAL
PAINLEVEL_OUTOF10: 0
PAINLEVEL_OUTOF10: 0

## 2021-11-20 NOTE — PLAN OF CARE
Problem: Falls - Risk of:  Goal: Will remain free from falls  Description: Will remain free from falls  11/20/2021 1038 by Sudheer Miller RN  Outcome: Ongoing  Note: Fall risk assessment completed. Fall precautions in place. Call light within reach. Pt educated on calling for assistance before getting up. Walkway free of clutter. Will continue to monitor. Problem: Skin Integrity:  Goal: Absence of new skin breakdown  Description: Absence of new skin breakdown  11/20/2021 1038 by Sudheer Miller RN  Outcome: Ongoing  Note: Patient's skin was assessed. Pt currently has scattered bruising and redness on mid spine and coccyx. No new findings were noted. Patient is being educated on importance of turning/repostioning at least every two hours. RN will continue to educate and monitor. Problem: Daily Care:  Goal: Daily care needs are met  Description: Daily care needs are met  11/20/2021 1038 by Sudheer Miller RN  Outcome: Ongoing  Note: Pt is A&O x4 and calls appropriately. Call light within reach at all times. RN/PCA doing hourly rounds. Needs are being met this shift. Will continue to monitor. Problem: Pain:  Goal: Patient's pain/discomfort is manageable  Description: Patient's pain/discomfort is manageable  11/20/2021 1038 by Sudheer Miller RN  Outcome: Ongoing  Note: Pt assessed for pain. Pt denies any pain at this time. Will continue to monitor pt and assess for pain throughout rest of shift. The use of non-pharmacological measures are used appropriately, such as rest, repositioning, and relaxation techniques. Problem: Nutrition  Goal: Optimal nutrition therapy  11/20/2021 1038 by Sudheer Miller RN  Outcome: Ongoing  Note: Pt's daily weight and I&O being monitored. Pt on regular diet with thin liquids. Will continue to monitor.

## 2021-11-20 NOTE — PROGRESS NOTES
Physical Therapy  Facility/Department: [address] [facility] REH[facility]  Daily Treatment Note  NAME: Cherelle Bowen  : 1929  MRN: [id number]    Date of Service: 2021    Discharge Recommendations:  Continue to assess pending progress, Patient would benefit from continued therapy after discharge, Home with assist PRN   PT Equipment Recommendations  Equipment Needed: No  Other: patient's daugther reports that pt does have another wheeled walker that she can use on the first floor    Assessment   Body structures, Functions, Activity limitations: Decreased functional mobility ; Decreased ADL status; Decreased strength; Decreased balance; Decreased endurance; Increased pain; Decreased posture; Decreased safe awareness  Assessment: Patient was more fatigued for her PM treatment and was not feeling well due to acid reflux, and continues to be limited by pain, weakness, and impaired endurance with functional mobility. Pt is functioning below baseline and will benefit from continued skilled PT intervention to address impaired strengh, and functional mobility. Treatment Diagnosis: impaired mobility  Prognosis: Good  PT Education: PT Role; Plan of Care; Functional Mobility Training; General Safety; Gait Training; Transfer Training  REQUIRES PT FOLLOW UP: Yes  Activity Tolerance  Activity Tolerance: Patient Tolerated treatment well  Activity Tolerance: Pt was tired at the end of her session and returned to bed when we were done. Patient Diagnosis(es): There were no encounter diagnoses. has a past medical history of Arthritis, Endometrial thickening on ultrasound, Glaucoma, Hyperlipidemia, Hypertension, Pneumonia, Post-menopausal bleeding, and TIA (transient ischemic attack). has a past surgical history that includes Thyroidectomy, partial; Varicose vein surgery; Breast surgery; and Hip Arthroplasty (Left, 2016).     Restrictions  Restrictions/Precautions  Restrictions/Precautions: Fall Risk (High)  Position Activity Restriction  Other position/activity restrictions: reg diet, assume BLT precautions d/t back pain, contact precautions- MDRO  Subjective   General  Chart Reviewed: Yes  Additional Pertinent Hx: Per Dr. Merlinda Hossein is a 81 yo F with pmh HTN, HLD, TIA, OA s/p left VITO who initially presented 11/13/2021 with low back and left hip pain. Symptoms started ~1 week ago with no clear inciting event. She has had persistent difficulty with ambulation and progressive weakness/fatigue which prompted her to present to ED. She was found to have klebsiella/proteus UTI. CT left hip negative for acute abnormality or hardware complications. CT lumbar spine showed severe multilevel degenerative changes with no significant canal stenosis. She was seen by Ortho. She is being treated with steroid taper. Now presents to ARU with impaired mobility and self-care below her baseline. Currently, patient reports some improvement in her low back and left hip pain. Pain is localized to the left low back/upper buttock and lateral left hip. Described as sharp. Worse with movement and weight bearing. Improves with rest. She denies tingling/numbness or focal weakness\"  Response To Previous Treatment: Patient with no complaints from previous session. Family / Caregiver Present: Yes (daugther and son in law)  Referring Practitioner: Dr. Yessica Bright: Pt reports she is currently having awful acid reflux, she reports she was given some medication but it did not work. Called RN to alert her that pt is requesting something more for reflux. Pt is fatigued and initially agreed to go for a walk, after she was done with the bathroom, she requested back to bed.           Orientation  Orientation  Overall Orientation Status: Within Functional Limits  Cognition      Objective   Bed mobility  Rolling to Left: Modified independent  Supine to Sit: Modified independent  Sit to Supine: Modified independent  Scooting: Modified independent  Comment: bed in room, slightly elevated HOB, pt did not use rails  Transfers  Sit to Stand: Stand by assistance  Stand to sit: Stand by assistance  Ambulation  Ambulation?: Yes  Ambulation 1  Surface: level tile  Device: Rolling Walker  Assistance: Stand by assistance  Quality of Gait: Narrow GARY. decreased stride length, with step through pattern  Gait Deviations: Decreased step length; Decreased step height  Distance: 15 feet x2, to and from bathroom  Comments: Pt did better staying inside rolling walker without cues. Stairs/Curb  Stairs?: No  Stairs  # Steps : 12  Stairs Height: 6\"  Rails: Bilateral (Non-reciprocal pattern ascending and descending)  Assistance: Contact guard assistance  Comment: No cues needed for safety or sequencing     Balance  Posture: Fair  Sitting - Static: Good  Sitting - Dynamic: Good  Standing - Static: Fair; +  Standing - Dynamic: Fair; +  Comments: CGA with wheeled walker  Other exercises  Other exercises?: Yes  Other exercises 1: Standing in // bars, pt performed 10 reps of toe/heel raise, mini-squat, march, hip abduction, and hip extension         Other Activities: Other (see comment)  Comment: Assisted pt in/out of bathroom, pt performed her own brittaney-care and donned/doffed clothing on her own.               G-Code     OutComes Score                                                     AM-PAC Score             Goals  Short term goals  Time Frame for Short term goals: 5-7 days  Short term goal 1: bed mobility with independent  Short term goal 2: transfers with MI  Short term goal 3: ambulate community distances with MI  Short term goal 4: ascend / descend curb step with wheeled with SBA  Short term goal 5: ascend/descend 12 steps with Mi  Long term goals  Time Frame for Long term goals : STG= LTG  Patient Goals   Patient goals : Patient's goal is to go home and take care of yourself    Plan    Plan  Times per week: 5-6 x  Times per day: Twice a day  Plan weeks: 1

## 2021-11-20 NOTE — PROGRESS NOTES
Department of Physical Medicine & Rehabilitation  Progress Note    Patient Identification:  Fernando Elmore  [id number]  : 1929  Admit date: 2021    Chief Complaint: Debility    Subjective:   No acute events overnight. Patient seen this am resting in bed and visiting with family. She states she is having difficulty sleeping at night. We discussed that she has melatonin ordered, and she can take it to help her sleep at bedtime. She verbalized understanding. Labs reviewed. ROS: No f/c, n/v, cp     Objective:  Patient Vitals for the past 24 hrs:   BP Temp Temp src Pulse Resp SpO2 Weight   21 0854 -- -- -- -- -- 97 % --   21 0730 (!) 143/81 -- -- 88 -- -- --   21 0511 (!) 149/82 97.7 °F (36.5 °C) Oral 62 17 97 % 100 lb 5 oz (45.5 kg)   21 119/71 98.3 °F (36.8 °C) Oral 90 17 98 % --   21 1713 117/67 97.9 °F (36.6 °C) Oral 77 16 96 % --     Const: Alert. No distress, pleasant. HEENT: Normocephalic, atraumatic. Normal sclera/conjunctiva. MMM. CV: Regular rate and rhythm. Resp: No respiratory distress. Lungs CTAB. Abd: Soft, nontender, nondistended, NABS+   Ext: No edema. MSK: +Kyphoscoliosis, decreased spine and hip ROM. Neuro: Alert, oriented, appropriately interactive. Psych: Cooperative, appropriate mood and affect    Laboratory data: Available via EMR. Last 24 hour lab  No results found for this or any previous visit (from the past 24 hour(s)).     Therapy progress:  PT  Position Activity Restriction  Other position/activity restrictions: reg diet, assume BLT precautions d/t back pain, contact precautions- MDRO  Objective     Sit to Stand: Stand by assistance  Stand to sit: Stand by assistance  Bed to Chair: Contact guard assistance  Device: Rolling Walker  Assistance: Stand by assistance  Distance: 190' over level surface and dorsills with several turns; short distances in therapy gym  OT  PT Equipment Recommendations  Equipment Needed: Yes  Other: patient owns one wheeled walker and uses on her second floor, may need second wheeled walker on first floor  Toilet - Technique: Ambulating  Equipment Used: Grab bars  Toilet Transfers Comments: cues & mild unsteadiness d/t L hip & back weakness  Assessment        SLP          Body mass index is 16.69 kg/m². Rehabilitation Diagnosis:   Orthopedic, 8.9, Other Orthopedic        Assessment and Plan:     Impairments: generalized weakness, decreased spine/hip ROM, balance, endurance     Lumbar spondylosis and degenerative disc disease  -Pain improving significantly with prednisone taper  -PT/OT     Debility   -PT/OT to address endurance, strength, compensatory strategies, equipment     Klebsiella/proteus UTI  -Received ceftriaxone on acute floor --> transition to ciprofloxacin based on sensitivities.     Hyponatremia  -Possibly low solute/hypovolemic hyponatremia  -Encourage po intake and monitor -- improving    Hypokalemia  -Replaced     HTN  -amlodipine, lisinopril     HLD  -atorvastatin     Hypothyroidism  -levothyroxine     GERD  -pantoprazole     H/o OA s/p left VITO  -CT negative for fracture or hardware malfunction     Bladder   -High risk retention   -Monitor PVRs, SC prn >300cc     Bowel   -High risk constipation   -senna+colace BID, PRN miralax, MoM, and bisacodyl supp.     Safety   -fall precautions     Pain control  -acetaminophen and methocarbamol prn     PPx  -DVT: heparin  -GI: pantoprazole       Rehab Progress: Making progress. Working on functional mobility, balance, compensatory strategies for ADLs, safety. Anticipated Dispo: home alone, family checks in 2 days/week, looking into aide services  Services: [facility] PT, OT, RN  DME: [facility]  ELOS: 5-7 days      Electronically signed by JOSE F Burnett CNP on 11/20/2021 at 9:54 AM     The patient was seen in conjunction with the nurse practitioner with independent history, evaluation and examination.  I agree with the note which has been adjusted to reflect my findings. I have had face to face contact with the patient and performed a substantive portion of the E/M visit. In summary, patient doing well overall. Addressing sleep as above. Having heartburn also. PRN tums and famotidine added. Already on ppi. [place].  Yahir Lara MD 11/20/2021, 3:19 PM

## 2021-11-20 NOTE — PROGRESS NOTES
Physical Therapy  Facility/Department: [address] REHAB  Daily Treatment Note  NAME: Federica Park  : 1929  MRN: [id number]    Date of Service: 2021    Discharge Recommendations:  Continue to assess pending progress, Patient would benefit from continued therapy after discharge, Home with assist PRN   PT Equipment Recommendations  Equipment Needed: No  Other: patient's daugther reports that pt does have another wheeled walker that she can use on the first floor    Assessment   Body structures, Functions, Activity limitations: Decreased functional mobility ; Decreased ADL status; Decreased strength; Decreased balance; Decreased endurance; Increased pain; Decreased posture; Decreased safe awareness  Assessment: Patient tolerated treatment well this AM, but continues to be limited by pain, weakness, and impaired endurance with functional mobility. Pt is functioning below baseline and will benefit from continued skilled PT intervention to address impaired strengh, and functional mobility. Treatment Diagnosis: impaired mobility  Prognosis: Good  PT Education: PT Role; Plan of Care; Functional Mobility Training; General Safety; Gait Training; Transfer Training  REQUIRES PT FOLLOW UP: Yes  Activity Tolerance  Activity Tolerance: Patient Tolerated treatment well  Activity Tolerance: Pt was tired at the end of her session and returned to bed when we were done. Patient Diagnosis(es): There were no encounter diagnoses. has a past medical history of Arthritis, Endometrial thickening on ultrasound, Glaucoma, Hyperlipidemia, Hypertension, Pneumonia, Post-menopausal bleeding, and TIA (transient ischemic attack). has a past surgical history that includes Thyroidectomy, partial; Varicose vein surgery; Breast surgery; and Hip Arthroplasty (Left, 2016).     Restrictions  Restrictions/Precautions  Restrictions/Precautions: Fall Risk (High)  Position Activity Restriction  Other position/activity restrictions: reg diet, assume BLT precautions d/t back pain, contact precautions- MDRO  Subjective   General  Chart Reviewed: Yes  Additional Pertinent Hx: Per Dr. Sloan Melita is a 79 yo F with pmh HTN, HLD, TIA, OA s/p left VITO who initially presented 11/13/2021 with low back and left hip pain. Symptoms started ~1 week ago with no clear inciting event. She has had persistent difficulty with ambulation and progressive weakness/fatigue which prompted her to present to ED. She was found to have klebsiella/proteus UTI. CT left hip negative for acute abnormality or hardware complications. CT lumbar spine showed severe multilevel degenerative changes with no significant canal stenosis. She was seen by Ortho. She is being treated with steroid taper. Now presents to ARU with impaired mobility and self-care below her baseline. Currently, patient reports some improvement in her low back and left hip pain. Pain is localized to the left low back/upper buttock and lateral left hip. Described as sharp. Worse with movement and weight bearing. Improves with rest. She denies tingling/numbness or focal weakness\"  Response To Previous Treatment: Patient with no complaints from previous session.   Family / Caregiver Present: Yes (daugther and son in law)  Referring Practitioner: Dr. Glover Province: Pt reports 5/10 pain in left lower lumbar region, this is relieved with rest.  Pt very agreeable to PT this am.          Orientation  Orientation  Overall Orientation Status: Within Functional Limits  Cognition      Objective   Bed mobility  Rolling to Left: Modified independent  Supine to Sit: Modified independent  Sit to Supine: Modified independent  Scooting: Modified independent  Comment: flat mat in gym  Transfers  Sit to Stand: Stand by assistance  Stand to sit: Stand by assistance  Ambulation  Ambulation?: Yes  Ambulation 1  Surface: level tile  Device: Rolling Walker  Assistance: Stand by assistance  Quality of Gait: Narrow GARY. decreased stride length, with step through pattern  Gait Deviations: Decreased step length; Decreased step height  Distance: 190' x2 and short distances in therapy gym  Comments: Pt did better staying inside rolling walker without cues. Stairs/Curb  Stairs?: Yes  Stairs  # Steps : 12  Stairs Height: 6\"  Rails: Bilateral (Non-reciprocal pattern ascending and descending)  Assistance: Contact guard assistance  Comment: No cues needed for safety or sequencing     Balance  Posture: Fair  Sitting - Static: Good  Sitting - Dynamic: Good  Standing - Static: Fair; +  Standing - Dynamic: Fair; +  Comments: CGA with wheeled walker  Other exercises  Other exercises?: Yes  Other exercises 1: Standing in // bars, pt performed 10 reps of toe/heel raise, mini-squat, march, hip abduction, and hip extension         Other Activities: Other (see comment)  Comment: Assisted pt in/out of bathroom, pt performed her own brittaney-care and donned/doffed clothing on her own.               G-Code     OutComes Score                                                     AM-PAC Score             Goals  Short term goals  Time Frame for Short term goals: 5-7 days  Short term goal 1: bed mobility with independent  Short term goal 2: transfers with MI  Short term goal 3: ambulate community distances with MI  Short term goal 4: ascend / descend curb step with wheeled with SBA  Short term goal 5: ascend/descend 12 steps with Mi  Long term goals  Time Frame for Long term goals : STG= LTG  Patient Goals   Patient goals : Patient's goal is to go home and take care of yourself    Plan    Plan  Times per week: 5-6 x  Times per day: Twice a day  Plan weeks: 1 week  Specific instructions for Next Treatment: work on endurance  Current Treatment Recommendations: Strengthening, Functional Mobility Training, Transfer Training, Balance Training, Gait Training, Stair training, Neuromuscular Re-education, Patient/Caregiver Education & Training, Safety Education & Training, Equipment Evaluation, Education, & procurement, Endurance Training, Home Exercise Program, Pain Management  Safety Devices  Type of devices:  All fall risk precautions in place, Gait belt, Patient at risk for falls, Nurse notified, Call light within reach, Left in bed, Bed alarm in place  Restraints  Initially in place: No     Therapy Time   Individual Concurrent Group Co-treatment   Time In 0900         Time Out 1015         Minutes 75         Timed Code Treatment Minutes: 300 [facility], PT

## 2021-11-20 NOTE — PROGRESS NOTES
Patient admitted to rehab with lumbar spondylosis and degenerated disc disease. A&Ox4. Pt currently on contact isolation for MDRO. Transfers with walker/gait belt x1 assist. On regular diet, tolerating well. Medications taken whole with thin liquids. On heparin for DVT prophylaxis. On RA. Has been continent of bowel and bladder. LBM 11/19/21, pt refused stool softeners. Pt denies pain/discomfort. Chair/bed alarms in use and call light in reach. Will continue to monitor.

## 2021-11-20 NOTE — PLAN OF CARE
Problem: Falls - Risk of:  Goal: Will remain free from falls  Description: Will remain free from falls  11/20/2021 0011 by Leo Trujillo RN  Outcome: Ongoing  Note: Pt educated on falls precautions and safety. Call light and personal belongings within reach at all times. Non skid socks in use when up. Hourly rounding and alarms active. Problem: Skin Integrity:  Goal: Absence of new skin breakdown  Description: Absence of new skin breakdown  Outcome: Ongoing  Note: Able to change positions in bed without assist, no evidence of skin breakdown noted. Waffle cushion in place to chair.

## 2021-11-20 NOTE — PROGRESS NOTES
Occupational Therapy  Facility/Department: [address] IP REHAB  AM and PM session    NAME: Lisa Ruiz  : 1929  MRN: [id number]    Date of Service: 2021    Discharge Recommendations:  S Level 1, Home with assist PRN  OT Equipment Recommendations  Other: has all needed DME at home    Assessment   Performance deficits / Impairments: Decreased functional mobility ; Decreased ADL status; Decreased high-level IADLs; Decreased balance; Decreased endurance; Decreased strength; Decreased safe awareness  Assessment: Pt tolerated session well this date but limited by fatigue. Pt declining to go to therapy gym but agreeable to OT session in room. Pt performed fxl mob/transfers in room with RW SBA. Pt required SBA for toileting and LB dressing tasks. Pt required re-education and verbal cues for technique for wiping for cleanliness after toileting. Issued HEP for increasing BUE strength and ROM for improving ADL, IADL and fxl mob/transfers. Pt left in bed to rest with call light within reach and all needs met. Treatment Diagnosis: impaired ADLs  Prognosis: Good  History: see chart  Exam: impaired ADLs, t/f, fxl mob  Assistance / Modification: SBA using RW, SBA w/ toileting  OT Education: ADL Adaptive Strategies; Transfer Training; Home Exercise Program  Patient Education: gave cues for proper wiping tech front to back  after BM (needed set up & cues to clean up after bowel accident)  Barriers to Learning: forgetful  REQUIRES OT FOLLOW UP: Yes  Activity Tolerance  Activity Tolerance: Patient Tolerated treatment well; Patient limited by fatigue  Activity Tolerance: Requesting short break to rest during OT session. Safety Devices  Safety Devices in place: Yes  Type of devices: Call light within reach; Nurse notified; Left in bed; Bed alarm in place; Gait belt         Patient Diagnosis(es): There were no encounter diagnoses.       has a past medical history of Arthritis, Endometrial thickening on ultrasound, Glaucoma, Hyperlipidemia, Hypertension, Pneumonia, Post-menopausal bleeding, and TIA (transient ischemic attack). has a past surgical history that includes Thyroidectomy, partial; Varicose vein surgery; Breast surgery; and Hip Arthroplasty (Left, 08/26/2016). Restrictions  Restrictions/Precautions  Restrictions/Precautions: Fall Risk (High)  Position Activity Restriction  Other position/activity restrictions: reg diet, assume BLT precautions d/t back pain, contact precautions- MDRO  Subjective   General  Chart Reviewed: Yes, Progress Notes, History and Physical  Patient assessed for rehabilitation services?: Yes  Additional Pertinent Hx: Per Rickey Jimenez MD's H&P : \"The patient is a 80 y.o. female with hx HTN, HLD who presents to [facility] with left lower back and hip pain. Patient states that she has had gradually worsening left lower back and hip pain over the past several days. She had a hip replacement several years ago. She describes the pain as sharp, 9/10 in severity, with rest making the pain better and weight bearing making the pain worse. She is struggling to ambulate due to the pain. She lives by herself at home, so she was worried and decided to come to the hospital for further evaluation. Denies fever, chills, chest pain, shortness of breath, abdominal pain, nausea, vomiting, constipation, diarrhea, and dysuria. In the ED, labs were significant for a sodium of 135, chloride of 98, glucose of 106. U/A showed evidence of UTI. CXR showed no acute process. CT Left Hip without contrast showed no acute abnormality, and no fracture or findings of prosthetic loosening with an incidental 3.1 cm distal AAA. CT Lumbar Spine without contrast showed no acute lumbar spine abnormality with severe multilevel degenerative changes without significant canal stenosis. \"  Response to previous treatment: Patient with no complaints from previous session  Family / Caregiver Present: No  Referring Practitioner:  Heis  Diagnosis: Low back pain, left hip pain, debility, UTI  Subjective  Subjective: met in room, resting in bed quietly. Pt denies pain but reports feeling very fatigued this PM (did not sleep well last night)  General Comment  Comments: agreeable for shower      Orientation  Orientation  Overall Orientation Status: Within Normal Limits  Orientation Level: Oriented X4     Objective    ADL  Grooming: Independent (Brushed teeth and washed hands in stance at sink)  LE Dressing: Stand by assistance; Verbal cueing (Increased time)  Toileting: Stand by assistance; Increased time to complete (Brief soiled with BM, Pt able manage clothes and hygiene with verbal cues for technique)  Additional Comments: Pt re-educated and cued for proper way for wiping front to back using wipe, had stool smears in vaginal area--VCs + wipes & extra time to clean up        Balance  Sitting Balance: Modified independent   Standing Balance: Stand by assistance  Standing Balance  Time: @ 2-3 minutes  Activity: during grooming  Functional Mobility  Functional - Mobility Device: Rolling Walker  Activity: To/from bathroom  Assist Level: Stand by assistance  Functional Mobility Comments: bed >< bathroom with RW SBA/SUP.  No LOB or signs of fatigue  Toilet Transfers  Toilet - Technique: Ambulating (RW)  Equipment Used: Grab bars  Toilet Transfer: Stand by assistance  Wheelchair Bed Transfers  Wheelchair/Bed - Technique: Ambulating (RW)  Equipment Used: Bed  Level of Asssistance: Stand by assistance  Bed mobility  Rolling to Left: Modified independent  Supine to Sit: Modified independent  Sit to Supine: Modified independent  Scooting: Modified independent  Transfers  Sit to stand: Stand by assistance  Stand to sit: Stand by assistance  Transfer Comments: to/from [facility]  Overall Cognitive Status: WNL  Cognition Comment: cooperative, follows commands but is a little impulsive, forgetful; has good sense of humor              Additional Activities Comment  Additional Activities: Other (balance and activity tolerance)  Additional Activities: Pt participated in sitting and standing balance activity in order to increase core strength/control as well as increase activity tolerance to improve fxl mobility/transfers, ADLs and IADLs. Pt participate in ball tap and catch/toss while sitting EOB and standing with and with/out UE support on RW overall SBA. Pt tolerated upright position for ~3 mins with no LOB or signs of fatigue. Type of ROM/Therapeutic Exercise  Type of ROM/Therapeutic Exercise: AROM  Comment: Issued HEP for increasing BUE strength and ROM for improving ADL, IADL and fxl mob/transfers. Pt demo'd good return demonstration and performed supine in bed this date d/t increased fatigue. Pt educated to discontinue if pain occurs and rest whenever needed         Second session    Pt requested break from OT from earlier session d/t fatigue. Returned to see pt this PM for 30 more mins however pt had just went to bathroom, returned back to bed with PT and declining any further activity. Pt educated and encouraged to participate in brief therapy session however pt continued to decline. Pt left in bed, call light within reach and all needs met.       Variance: 30 mins      Plan   Plan  Times per week: 5-6  Times per day: Twice a day  Plan weeks: 5-7 days  Specific instructions for Next Treatment: kitchen tasks  Current Treatment Recommendations: Functional Mobility Training, Balance Training, Strengthening, Endurance Training, Self-Care / ADL, Safety Education & Training, Equipment Evaluation, Education, & procurement, Home Management Training    Goals  Short term goals  Time Frame for Short term goals: by 5 days pt will complete  Short term goal 1: Grooming while standing at sink IND'ly--MET  Short term goal 2: UB dressing IND--MET  Short term goal 3: LB dressing IND (loan A/E if indicated)  Short term goal 4: toilet t/f IND'ly using RW  Short term goal 5: toilet tasks IND'ly  Long term goals  Time Frame for Long term goals : by 5-7 days pt will complete  Long term goal 1: shower level bathing w/ MI using shower chair  Long term goal 2: improve standing tolerance x 8-10 minutes during simple meal prep/IADLs  Patient Goals   Patient goals : \"be able to move around a little better\"       Therapy Time   Individual Concurrent Group Co-treatment   Time In 1230         Time Out 1330         Minutes 60         Timed Code Treatment Minutes: 60 Minutes  Variance: [address] Adilson Vega, OT   Electronically signed by Angle Mart, OTR/L  License # [id number]

## 2021-11-21 PROCEDURE — 6360000002 HC RX W HCPCS: Performed by: PHYSICAL MEDICINE & REHABILITATION

## 2021-11-21 PROCEDURE — 6370000000 HC RX 637 (ALT 250 FOR IP): Performed by: PHYSICAL MEDICINE & REHABILITATION

## 2021-11-21 PROCEDURE — 94761 N-INVAS EAR/PLS OXIMETRY MLT: CPT

## 2021-11-21 PROCEDURE — 1280000000 HC REHAB R&B

## 2021-11-21 RX ADMIN — HEPARIN SODIUM 5000 UNITS: 5000 INJECTION INTRAVENOUS; SUBCUTANEOUS at 20:29

## 2021-11-21 RX ADMIN — DORZOLAMIDE HYDROCHLORIDE AND TIMOLOL MALEATE 1 DROP: 20; 5 SOLUTION/ DROPS OPHTHALMIC at 20:38

## 2021-11-21 RX ADMIN — CIPROFLOXACIN 500 MG: 500 TABLET, FILM COATED ORAL at 09:29

## 2021-11-21 RX ADMIN — LATANOPROST 1 DROP: 50 SOLUTION OPHTHALMIC at 20:40

## 2021-11-21 RX ADMIN — PREDNISONE 20 MG: 20 TABLET ORAL at 09:28

## 2021-11-21 RX ADMIN — LISINOPRIL 20 MG: 20 TABLET ORAL at 09:29

## 2021-11-21 RX ADMIN — ASPIRIN 81 MG: 81 TABLET, CHEWABLE ORAL at 09:29

## 2021-11-21 RX ADMIN — Medication 1 CAPSULE: at 09:29

## 2021-11-21 RX ADMIN — Medication 3 MG: at 20:29

## 2021-11-21 RX ADMIN — AMLODIPINE BESYLATE 5 MG: 5 TABLET ORAL at 09:29

## 2021-11-21 RX ADMIN — HEPARIN SODIUM 5000 UNITS: 5000 INJECTION INTRAVENOUS; SUBCUTANEOUS at 09:29

## 2021-11-21 RX ADMIN — LEVOTHYROXINE SODIUM 75 MCG: 0.07 TABLET ORAL at 05:35

## 2021-11-21 RX ADMIN — ATORVASTATIN CALCIUM 10 MG: 10 TABLET, FILM COATED ORAL at 09:29

## 2021-11-21 RX ADMIN — PANTOPRAZOLE SODIUM 40 MG: 40 TABLET, DELAYED RELEASE ORAL at 05:35

## 2021-11-21 RX ADMIN — DORZOLAMIDE HYDROCHLORIDE AND TIMOLOL MALEATE 1 DROP: 20; 5 SOLUTION/ DROPS OPHTHALMIC at 09:31

## 2021-11-21 ASSESSMENT — PAIN SCALES - GENERAL: PAINLEVEL_OUTOF10: 0

## 2021-11-21 NOTE — PROGRESS NOTES
Resting well in bed. Pt admitted with back pain. Is oriented x4 with some forgetfulness at times. Transferring with a walker x1 CGA. Lungs CTA, HR regular. Abdomen non tender with active bowel sounds. Has been continent of bowel and bladder. Denies pain. Pt reports poor sleep last night and was medicated with PRN melatonin. All needs assessed with rounding. Alarms active. Will monitor.  Maricarmen Islas RN

## 2021-11-21 NOTE — PROGRESS NOTES
Patient admitted to rehab with lumbar spondylosis and degenerated disc disease. A&Ox4. Pt currently on contact isolation for MDRO. Transfers with walker/gait belt x1 assist. On regular diet, tolerating well. Medications taken whole with thin liquids. On heparin for DVT prophylaxis. On RA. Has been continent of bowel and bladder. LBM 11/20/21, pt refused stool softener. Pt denies pain/discomfort. Chair/bed alarms in use and call light in reach. Will continue to monitor.

## 2021-11-21 NOTE — PLAN OF CARE
Problem: Falls - Risk of:  Goal: Will remain free from falls  Description: Will remain free from falls  11/20/2021 2359 by Jo Ann Chavez RN  Outcome: Ongoing  Note: Pt educated on falls precautions and safety. Call light and personal belongings within reach at all times. Non skid socks in use when up. Hourly rounding and alarms active. Problem: Pain:  Goal: Patient's pain/discomfort is manageable  Description: Patient's pain/discomfort is manageable  11/20/2021 2359 by Jo Ann Chavez RN  Outcome: Ongoing  Note: Able to rate pain using a 1-10 scale, medicated per prn orders, see MAR.  Able to verbalize a reduction in pain and/or able to fall asleep and remain asleep without any s/s of pain

## 2021-11-22 LAB
ANION GAP SERPL CALCULATED.3IONS-SCNC: 10 MMOL/L (ref 3–16)
BASOPHILS ABSOLUTE: 0 K/UL (ref 0–0.2)
BASOPHILS RELATIVE PERCENT: 0.1 %
BUN BLDV-MCNC: 14 MG/DL (ref 7–20)
CALCIUM SERPL-MCNC: 8.2 MG/DL (ref 8.3–10.6)
CHLORIDE BLD-SCNC: 104 MMOL/L (ref 99–110)
CO2: 25 MMOL/L (ref 21–32)
CREAT SERPL-MCNC: 0.6 MG/DL (ref 0.6–1.2)
EOSINOPHILS ABSOLUTE: 0.1 K/UL (ref 0–0.6)
EOSINOPHILS RELATIVE PERCENT: 0.8 %
GFR AFRICAN AMERICAN: >60
GFR NON-AFRICAN AMERICAN: >60
GLUCOSE BLD-MCNC: 83 MG/DL (ref 70–99)
HCT VFR BLD CALC: 32.4 % (ref 36–48)
HEMOGLOBIN: 10.5 G/DL (ref 12–16)
LYMPHOCYTES ABSOLUTE: 2.8 K/UL (ref 1–5.1)
LYMPHOCYTES RELATIVE PERCENT: 36.6 %
MCH RBC QN AUTO: 29.7 PG (ref 26–34)
MCHC RBC AUTO-ENTMCNC: 32.4 G/DL (ref 31–36)
MCV RBC AUTO: 91.5 FL (ref 80–100)
MONOCYTES ABSOLUTE: 0.6 K/UL (ref 0–1.3)
MONOCYTES RELATIVE PERCENT: 8.5 %
NEUTROPHILS ABSOLUTE: 4.1 K/UL (ref 1.7–7.7)
NEUTROPHILS RELATIVE PERCENT: 54 %
PDW BLD-RTO: 15.5 % (ref 12.4–15.4)
PLATELET # BLD: 260 K/UL (ref 135–450)
PMV BLD AUTO: 7 FL (ref 5–10.5)
POTASSIUM REFLEX MAGNESIUM: 4.1 MMOL/L (ref 3.5–5.1)
RBC # BLD: 3.54 M/UL (ref 4–5.2)
SODIUM BLD-SCNC: 139 MMOL/L (ref 136–145)
WBC # BLD: 7.6 K/UL (ref 4–11)

## 2021-11-22 PROCEDURE — 1280000000 HC REHAB R&B

## 2021-11-22 PROCEDURE — 36415 COLL VENOUS BLD VENIPUNCTURE: CPT

## 2021-11-22 PROCEDURE — 97535 SELF CARE MNGMENT TRAINING: CPT

## 2021-11-22 PROCEDURE — 85025 COMPLETE CBC W/AUTO DIFF WBC: CPT

## 2021-11-22 PROCEDURE — 80048 BASIC METABOLIC PNL TOTAL CA: CPT

## 2021-11-22 PROCEDURE — 97116 GAIT TRAINING THERAPY: CPT | Performed by: PHYSICAL THERAPIST

## 2021-11-22 PROCEDURE — 6360000002 HC RX W HCPCS: Performed by: PHYSICAL MEDICINE & REHABILITATION

## 2021-11-22 PROCEDURE — 97110 THERAPEUTIC EXERCISES: CPT

## 2021-11-22 PROCEDURE — 6370000000 HC RX 637 (ALT 250 FOR IP): Performed by: PHYSICAL MEDICINE & REHABILITATION

## 2021-11-22 PROCEDURE — 97530 THERAPEUTIC ACTIVITIES: CPT | Performed by: PHYSICAL THERAPIST

## 2021-11-22 PROCEDURE — 97110 THERAPEUTIC EXERCISES: CPT | Performed by: PHYSICAL THERAPIST

## 2021-11-22 RX ADMIN — ASPIRIN 81 MG: 81 TABLET, CHEWABLE ORAL at 07:38

## 2021-11-22 RX ADMIN — CLOBETASOL PROPIONATE: 0.5 CREAM TOPICAL at 20:35

## 2021-11-22 RX ADMIN — SENNOSIDES AND DOCUSATE SODIUM 1 TABLET: 50; 8.6 TABLET ORAL at 07:39

## 2021-11-22 RX ADMIN — DORZOLAMIDE HYDROCHLORIDE AND TIMOLOL MALEATE 1 DROP: 20; 5 SOLUTION/ DROPS OPHTHALMIC at 20:36

## 2021-11-22 RX ADMIN — PREDNISONE 10 MG: 10 TABLET ORAL at 07:39

## 2021-11-22 RX ADMIN — AMLODIPINE BESYLATE 5 MG: 5 TABLET ORAL at 07:38

## 2021-11-22 RX ADMIN — HEPARIN SODIUM 5000 UNITS: 5000 INJECTION INTRAVENOUS; SUBCUTANEOUS at 20:30

## 2021-11-22 RX ADMIN — Medication 3 MG: at 20:30

## 2021-11-22 RX ADMIN — DORZOLAMIDE HYDROCHLORIDE AND TIMOLOL MALEATE 1 DROP: 20; 5 SOLUTION/ DROPS OPHTHALMIC at 07:45

## 2021-11-22 RX ADMIN — LISINOPRIL 20 MG: 20 TABLET ORAL at 07:38

## 2021-11-22 RX ADMIN — CLOBETASOL PROPIONATE: 0.5 CREAM TOPICAL at 07:41

## 2021-11-22 RX ADMIN — PANTOPRAZOLE SODIUM 40 MG: 40 TABLET, DELAYED RELEASE ORAL at 05:29

## 2021-11-22 RX ADMIN — ATORVASTATIN CALCIUM 10 MG: 10 TABLET, FILM COATED ORAL at 07:38

## 2021-11-22 RX ADMIN — LATANOPROST 1 DROP: 50 SOLUTION OPHTHALMIC at 20:38

## 2021-11-22 RX ADMIN — Medication 1 CAPSULE: at 07:39

## 2021-11-22 RX ADMIN — LEVOTHYROXINE SODIUM 75 MCG: 0.07 TABLET ORAL at 05:29

## 2021-11-22 RX ADMIN — HEPARIN SODIUM 5000 UNITS: 5000 INJECTION INTRAVENOUS; SUBCUTANEOUS at 07:39

## 2021-11-22 ASSESSMENT — PAIN SCALES - GENERAL
PAINLEVEL_OUTOF10: 0
PAINLEVEL_OUTOF10: 0

## 2021-11-22 NOTE — PLAN OF CARE
Problem: Falls - Risk of:  Goal: Will remain free from falls  Description: Will remain free from falls  11/22/2021 7266 by Chuy Feliz RN  Outcome: Ongoing  Note: Assessment completed. Fall precautions in place. Uses walker x 1 for transfers. Non skid footwear and armband on. Bed/chair alarms in place and functioning. Personal items and call light within reach. Monitored frequently. 11/21/2021 2233 by Brittany Miranda RN  Outcome: Ongoing  Note: Fall risk assessment completed as charted. Pt is at risk for falls. Safety precautions in place. Call light and pt belongings in reach. Bed in low position. Bed/Chair Alarm on. Nonskid footwear on. All needs met. Pt instructed to call before getting up or out of bed. Pt verbalized understanding. Goal: Absence of physical injury  Description: Absence of physical injury  11/22/2021 3796 by Chuy Feliz RN  Outcome: Ongoing  11/21/2021 2233 by Brittany Miranda RN  Outcome: Ongoing     Problem: Skin Integrity:  Goal: Will show no infection signs and symptoms  Description: Will show no infection signs and symptoms  11/22/2021 0922 by Chuy Feliz RN  Outcome: Ongoing  Note: Assessment completed. No sign or symptoms of infection noted. Will continue to monitor. 11/21/2021 2233 by Brittany Miranda RN  Outcome: Ongoing  Note: Pt assessed for risk of skin breakdown. Encouraged pt to turn and reposition self while in bed. Skin clean and dry. No new skin breakdown noted. Heels floating. Will continue to assess skin condition each shift.     Goal: Absence of new skin breakdown  Description: Absence of new skin breakdown  11/22/2021 0922 by Chuy Feliz RN  Outcome: Ongoing  11/21/2021 2233 by Brittany Miranda RN  Outcome: Ongoing     Problem: Daily Care:  Goal: Daily care needs are met  Description: Daily care needs are met  11/21/2021 2233 by Brittany Miranda RN  Outcome: Ongoing     Problem: Pain:  Goal: Patient's pain/discomfort is manageable  Description: Patient's pain/discomfort is manageable  11/21/2021 2233 by Bridget Alston RN  Outcome: Ongoing  Note: Pt assessed for pain this shift. Pt pain/discomfort is managed with PRN pain medications per md order. Pt able to verbalize pain by using numerical scale. Education provided and documented.         Problem: Discharge Planning:  Goal: Patients continuum of care needs are met  Description: Patients continuum of care needs are met  11/21/2021 2233 by Bridget Alston RN  Outcome: Ongoing     Problem: Urinary Elimination:  Goal: Signs and symptoms of infection will decrease  Description: Signs and symptoms of infection will decrease  11/21/2021 2233 by Bridget Alston RN  Outcome: Ongoing     Problem: Nutrition  Goal: Optimal nutrition therapy  11/21/2021 2233 by Bridget Alston RN  Outcome: Ongoing

## 2021-11-22 NOTE — PROGRESS NOTES
Physical Therapy  Facility/Department: [address] REHAB  Daily Treatment Note AM and PM  NAME: Christine Ohara  : 1929  MRN: [id number]    Date of Service: 2021    Discharge Recommendations:  Continue to assess pending progress, Patient would benefit from continued therapy after discharge, Home with assist PRN   PT Equipment Recommendations  Equipment Needed: No  Other: Patient reports to use rollato on first floor    Assessment   Body structures, Functions, Activity limitations: Decreased functional mobility ; Decreased ADL status; Decreased strength; Decreased balance; Decreased endurance; Increased pain; Decreased posture; Decreased safe awareness  Assessment: Patient able to ambulate with wheeled walker with MI and practiced with rollator since states will use device on first floor. Patient steady with rollator but need cues for brakes. Patient able to ascend 12 steps with SBA/CGA with B rails. Patient with no pain c/o with mobility but needs frequent rest breaks between activity due to fatigue. Patient continues to be limited by weakness, and impaired endurance with functional mobility. Pt is functioning below baseline and will benefit from continued skilled PT intervention to address impaired strengh, and functional mobility. Anticipate D/C Wednesday, , with home PT. Treatment Diagnosis: impaired mobility  Prognosis: Good  PT Education: PT Role; Plan of Care; Functional Mobility Training; General Safety; Gait Training; Transfer Training  REQUIRES PT FOLLOW UP: Yes  Activity Tolerance  Activity Tolerance: Patient Tolerated treatment well  Activity Tolerance: Patient does fatigue after longer distances with no pain     Patient Diagnosis(es): There were no encounter diagnoses. has a past medical history of Arthritis, Endometrial thickening on ultrasound, Glaucoma, Hyperlipidemia, Hypertension, Pneumonia, Post-menopausal bleeding, and TIA (transient ischemic attack).    has a past surgical history that includes Thyroidectomy, partial; Varicose vein surgery; Breast surgery; and Hip Arthroplasty (Left, 08/26/2016). Restrictions  Restrictions/Precautions  Restrictions/Precautions: Fall Risk (High)  Position Activity Restriction  Other position/activity restrictions: reg diet, assume BLT precautions d/t back pain, contact precautions- MDRO  Subjective   General  Chart Reviewed: Yes  Additional Pertinent Hx: Per Dr. Raad Williamson is a 79 yo F with pmh HTN, HLD, TIA, OA s/p left VITO who initially presented 11/13/2021 with low back and left hip pain. Symptoms started ~1 week ago with no clear inciting event. She has had persistent difficulty with ambulation and progressive weakness/fatigue which prompted her to present to ED. She was found to have klebsiella/proteus UTI. CT left hip negative for acute abnormality or hardware complications. CT lumbar spine showed severe multilevel degenerative changes with no significant canal stenosis. She was seen by Ortho. She is being treated with steroid taper. Now presents to ARU with impaired mobility and self-care below her baseline. Currently, patient reports some improvement in her low back and left hip pain. Pain is localized to the left low back/upper buttock and lateral left hip. Described as sharp. Worse with movement and weight bearing. Improves with rest. She denies tingling/numbness or focal weakness\"  Response To Previous Treatment: Patient with no complaints from previous session. Family / Caregiver Present: Yes (daugther and son in law)  Referring Practitioner: Dr. Helga Izaguirre: Patient agreeable to therapy with no pain at present. Patient feels ready for D/C home, plans to use rollator.   General Comment  Comments: Pt seated in w/c in therapy gym          Orientation  Orientation  Overall Orientation Status: Within Functional Limits  Cognition      Objective   Bed mobility  Sit to Supine: Modified independent  Transfers  Sit to Stand: Modified independent  Stand to sit: Modified independent  Bed to Chair: Modified independent  Comment: with wheeled walker and rollator, cues with rollator to lock/unlock brakes  Ambulation  Ambulation?: Yes  Ambulation 1  Surface: level tile; carpet  Device: Rolling Walker  Assistance: Modified Independent  Quality of Gait: Narrow GARY. decreased stride length, with step through pattern  Gait Deviations: Decreased step length; Decreased step height  Distance: 180' with several turns, 79' over carpet threshold and turns  Ambulation 2  Surface - 2: level tile  Device 2: Rollator  Assistance 2: Modified Independent  Quality of Gait 2: Patient steady with rollator  Gait Deviations: Decreased step length; Decreased step height  Distance: 80' with several turns  Comments: patient needs cues to lock/unlock brakes  Stairs/Curb  Stairs?: Yes  Stairs  # Steps : 12  Stairs Height: 6\"  Rails: Bilateral  Curbs: 6\"  Device: Rolling walker; No Device (with curb step)  Assistance: Stand by assistance; Contact guard assistance  Comment: Performed 12 steps with B rails with SBA , reciprocal ascending and descend step to pattern, Curb step with whreeled walker with SBA    Second Session  S/ Patient okay regarding D/C on Wednesday 11/24, would like sooner but realize needs to be independent. Patient reports pain minimal L LB. O/ sit to stand with MI, ambulated 60', 200' with several turns 200' with supervision using wheeled walker. Performed sit to supine with MI, supine worked on Camila Brothers, SAQ x 15 AP, hip and knee flex /ext x 8 L LE and R LE x 10. Log roll towards R and sidelying to sit with MI.  Practiced ambulating with rollator with supervision cues to lock and unlock brakes, practiced sitting on rollator by placing against wall with MI. Performed standing exercises - heel rocks/toe raises x 10, marches x 20, mini squats x 15, sitting hip abduction x 10 B LE with yellow theraband.   Patient to OT session   Goals  Short term goals  Time Frame for Short term goals: 5-7 days  Short term goal 1: bed mobility with independent- met 11/22  Short term goal 2: transfers with MI- met 11/22 needs safety cues  Short term goal 3: ambulate community distances with MI- met 11/22  Short term goal 4: ascend / descend curb step with wheeled with SBA  Short term goal 5: ascend/descend 12 steps with Mi  Long term goals  Time Frame for Long term goals : STG= LTG  Patient Goals   Patient goals : Patient's goal is to go home and take care of yourself    Plan    Plan  Times per week: 5-6 x  Times per day: Twice a day  Plan weeks: 1 week  Specific instructions for Next Treatment: work on endurance  Current Treatment Recommendations: Strengthening, Functional Mobility Training, Transfer Training, Balance Training, Gait Training, Stair training, Neuromuscular Re-education, Patient/Caregiver Education & Training, Safety Education & Training, Equipment Evaluation, Education, & procurement, Endurance Training, Home Exercise Program, Pain Management  Safety Devices  Type of devices:  All fall risk precautions in place, Gait belt, Patient at risk for falls, Nurse notified, Call light within reach, Left in bed, Bed alarm in place  Restraints  Initially in place: No     Therapy Time   Individual Concurrent Group Co-treatment   Time In 0815         Time Out 0900         Minutes 45         Timed Code Treatment Minutes: [address] Time     Individual Co-treatment   Time In [address], PT # [id number]

## 2021-11-22 NOTE — CARE COORDINATION
SOCIAL WORK DISCHARGE SUMMARY:      DISCHARGE DATE:               Wednesday, 11-          DISCHARGE PLACE:                  Home                  Discharging to Facility/ Agency   · Name:  [facility]    · Address: [address] [place] [address]., [address] [place] [address]., Cherylkarl Frausto  · Phone: [phone]  · Fax: [phone]  ·                              TRANSPORTATION:                 son             TIME:                               10-12 noon        PREFERRED PHARMACY:        [facility]             NUMBER:                                MD ORDERS:   Sn/pt/ot      Dme:     None ordered.         [place], [place]     Case Management   [phone]    11/22/2021  2:24 PM

## 2021-11-22 NOTE — PROGRESS NOTES
Patient admitted with lumbar spondylosis and degenerated disc disease. A&O x4. Contact isolation for MDRO in urine. Transfers with FWW x 1 assist. Regular diet tolerated well. Medications whole with thins. No complaints of pain noted. Continent of bowel and bladder. LBM 11/21. Refusing senna. Bed alarm engaged. Call light and bedside table within reach. Frequent rounding for safety continues.

## 2021-11-22 NOTE — PLAN OF CARE
Problem: Falls - Risk of:  Goal: Will remain free from falls  Description: Will remain free from falls  Outcome: Ongoing  Note: Fall risk assessment completed as charted. Pt is at risk for falls. Safety precautions in place. Call light and pt belongings in reach. Bed in low position. Bed/Chair Alarm on. Nonskid footwear on. All needs met. Pt instructed to call before getting up or out of bed. Pt verbalized understanding. Problem: Skin Integrity:  Goal: Will show no infection signs and symptoms  Description: Will show no infection signs and symptoms  Outcome: Ongoing  Note: Pt assessed for risk of skin breakdown. Encouraged pt to turn and reposition self while in bed. Skin clean and dry. No new skin breakdown noted. Heels floating. Will continue to assess skin condition each shift. Problem: Pain:  Goal: Patient's pain/discomfort is manageable  Description: Patient's pain/discomfort is manageable  Outcome: Ongoing  Note: Pt assessed for pain this shift. Pt pain/discomfort is managed with PRN pain medications per md order. Pt able to verbalize pain by using numerical scale. Education provided and documented.

## 2021-11-22 NOTE — PROGRESS NOTES
Department of Physical Medicine & Rehabilitation  Progress Note    Patient Identification:  Oriana Wiseman  [id number]  : 1929  Admit date: 2021    Chief Complaint: Debility    Subjective: Patient seen this Jack Serrato was discussed this morning in our weekly rehab conference for 5 to 10 minutes with the entire rehab team, which includes PT, OT, nursing, dietary, social service, and rehab unit manager, to determine how much progress the patient has made in therapies and when the patient will be ready for discharge to home safely.  We think the patient will be ready for discharge to home in 2 days, on Wednesday. She now needs contact-guard assist with transfers, gait using a rolling walker, and going up and down 4 steps. She needs standby assist with her lower body bathing and dressing. Patient does live at home alone but has help from her children 3-4 times a week. We will plan discharge to home on Wednesday with continued PT, OT, and home health aide at discharge, and also set her up with a rolling walker at discharge. Repeat labs this morning look good and are stable. ROS: No f/c, n/v, cp     Objective:  Patient Vitals for the past 24 hrs:   BP Temp Temp src Pulse Resp SpO2 Weight   21 0418 (!) 149/74 98.1 °F (36.7 °C) Oral 62 16 97 % 101 lb 10.1 oz (46.1 kg)   21 1630 121/73 98.4 °F (36.9 °C) Oral 96 14 95 % --   21 0915 129/67 97.5 °F (36.4 °C) Oral 73 16 96 % --   21 0902 -- -- -- -- -- 97 % --     Const: Alert. No distress, pleasant. HEENT: Normocephalic, atraumatic. Normal sclera/conjunctiva. MMM. CV: Regular rate and rhythm. Resp: No respiratory distress. Lungs CTAB. Abd: Soft, nontender, nondistended, NABS+   Ext: No edema. MSK: +Kyphoscoliosis, decreased spine and hip ROM. Neuro: Alert, oriented, appropriately interactive. Psych: Cooperative, appropriate mood and affect    Laboratory data: Available via EMR.    Last 24 hour lab  No results found for this or any previous visit (from the past 24 hour(s)). Therapy progress:  PT  Position Activity Restriction  Other position/activity restrictions: reg diet, assume BLT precautions d/t back pain, contact precautions- MDRO  Objective     Sit to Stand: Stand by assistance  Stand to sit: Stand by assistance  Bed to Chair: Contact guard assistance  Device: Rolling Walker  Assistance: Stand by assistance  Distance: 15 feet x2, to and from bathroom  OT  PT Equipment Recommendations  Equipment Needed: No  Other: patient's daugther reports that pt does have another wheeled walker that she can use on the first floor  Toilet - Technique: Ambulating (RW)  Equipment Used: Grab bars  Toilet Transfers Comments: cues & mild unsteadiness d/t L hip & back weakness  Assessment        SLP          Body mass index is 16.91 kg/m².     Rehabilitation Diagnosis:   Orthopedic, 8.9, Other Orthopedic        Assessment and Plan:     Impairments: generalized weakness, decreased spine/hip ROM, balance, endurance     Lumbar spondylosis and degenerative disc disease  -Pain improving significantly with prednisone taper  -PT/OT     Debility   -PT/OT to address endurance, strength, compensatory strategies, equipment     Klebsiella/proteus UTI  -Received ceftriaxone on acute floor --> transition to ciprofloxacin based on sensitivities.     Hyponatremia  -Possibly low solute/hypovolemic hyponatremia  -Encourage po intake and monitor -- improving    Hypokalemia  -Replaced     HTN  -amlodipine, lisinopril     HLD  -atorvastatin     Hypothyroidism  -levothyroxine     GERD  -pantoprazole     H/o OA s/p left VITO  -CT negative for fracture or hardware malfunction     Bladder   -High risk retention   -Monitor PVRs, SC prn >300cc     Bowel   -High risk constipation   -senna+colace BID, PRN miralax, MoM, and bisacodyl supp.     Safety   -fall precautions     Pain control  -acetaminophen and methocarbamol prn     PPx  -DVT: heparin  -GI: pantoprazole       Rehab Progress: Making progress. Working on functional mobility, balance, compensatory strategies for ADLs, safety.   Anticipated Dispo: home alone, family checks in 2 days/week, looking into aide services  Services: [facility] PT, OT, ANUP, RN  DME: GONZALEZ, Shower chair  ELOS: 11/24      Electronically signed by Linda Kern MD on 11/22/2021 at 7:31 AM

## 2021-11-22 NOTE — DISCHARGE INSTR - COC
Continuity of Care Form    Patient Name: Angle Canales   :  1929  MRN:  [id number]    Admit date:  2021  Discharge date:  21    Code Status Order: DNR-CCA   Advance Directives:      Admitting Physician:  Felice Rai MD  PCP: Alicia Pascual MD    Discharging Nurse:  [facility] Unit/Room#: O1Y-1968/6050-95  Discharging Unit Phone Number: [phone]    Emergency Contact:   Extended Emergency Contact Information  Primary Emergency Contact: [facility] [address] Phone: [phone]  Relation: Child  Secondary Emergency Contact: [facility], [address] [facility] [place] [address] Phone: [phone]  Relation: Child    Past Surgical History:  Past Surgical History:   Procedure Laterality Date    BREAST SURGERY      left lumpectomy    HIP ARTHROPLASTY Left 2016    hemiarthroplasty left hip    THYROIDECTOMY, PARTIAL      VARICOSE VEIN SURGERY         Immunization History:   Immunization History   Administered Date(s) Administered    COVID-19, Pfizer, PF, 30mcg/0.3mL 2021, 2021, 10/02/2021    Influenza, High Dose (Fluzone 65 yrs and older) 10/16/2019    Tdap (Boostrix, Adacel) 2016       Active Problems:  Patient Active Problem List   Diagnosis Code    Urinary tract infection with hematuria N39.0, R31.9    Hyponatremia E87.1    Nausea & vomiting R11.2    Left displaced femoral neck fracture ([address] S72.002A    Community acquired pneumonia J18.9    Acute respiratory failure with hypoxia ([address] J96.01    High anion gap metabolic acidosis S31.7    Hypoxia R09.02    Rhinitis J31.0    Pneumatocele of lung J98.4    Sciatica of left side M54.32    Low back pain M54.50    Scoliosis of lumbosacral region due to degenerative disease of spine in adult M41.87    History of left hip hemiarthroplasty C56.986    Debility R53.81       Isolation/Infection:   Isolation            Contact          Patient Infection Status Infection Onset Added Last Indicated Last Indicated By Review Planned Expiration Resolved Resolved By    MDRO (multi-drug resistant organism) 11/13/21 11/16/21 11/13/21 Culture, Urine                Nurse Assessment:  Last Vital Signs: BP (!) 143/79   Pulse 62   Temp 98.1 °F (36.7 °C) (Oral)   Resp 16   Ht 5' 5\" (1.651 m)   Wt 101 lb 10.1 oz (46.1 kg)   SpO2 97%   BMI 16.91 kg/m²     Last documented pain score (0-10 scale): Pain Level: 0  Last Weight:   Wt Readings from Last 1 Encounters:   11/22/21 101 lb 10.1 oz (46.1 kg)     Mental Status:  oriented, alert, and coherent    IV Access:  - None    Nursing Mobility/ADLs:  Walking   Assisted  Transfer  Assisted  Bathing  Assisted  Dressing  Assisted  Toileting  Assisted  Feeding  Assisted  Med Admin  Independent  Med Delivery   none    Wound Care Documentation and Therapy:  Incision 08/26/16 Hip Left (Active)   Number of days: 1913       Wound 01/02/20 Pretibial Right lower skin tear (Active)   Number of days: 689       Wound 01/02/20 Pretibial Right large bruise with abrasion  (Active)   Number of days: 689        Elimination:  Continence: Bowel: Yes  Bladder: Yes  Urinary Catheter: None   Colostomy/Ileostomy/Ileal Conduit: No       Date of Last BM: 11/21    Intake/Output Summary (Last 24 hours) at 11/22/2021 1423  Last data filed at 11/21/2021 2029  Gross per 24 hour   Intake 480 ml   Output --   Net 480 ml     I/O last 3 completed shifts: In: 5 [P.O.:840]  Out: -     Safety Concerns: At Risk for Falls    Impairments/Disabilities:      None    Nutrition Therapy:  Current Nutrition Therapy:   - Oral Diet:  General    Routes of Feeding: Oral  Liquids: Thin Liquids  Daily Fluid Restriction: no  Last Modified Barium Swallow with Video (Video Swallowing Test): not done    Treatments at the Time of Hospital Discharge:   Respiratory Treatments: NA  Oxygen Therapy:  is not on home oxygen therapy.   Ventilator:    - No ventilator support    Rehab Therapies: Physical Therapy, Occupational Therapy, Nurse, HCA  Weight Bearing Status/Restrictions: No weight bearing restirctions  Other Medical Equipment (for information only, NOT a DME order):  walker  Other Treatments: [facility]: LEVEL 1 [facility] to establish plan of care for patient over 60 day period   Nursing  Initial home SN evaluation visit to occur within 24-48 hours for:  medication management  VS and clinical assessment  S&S chronic disease exacerbation education + when to contact MD / NP  care coordination  Medication Reconciliation during 1st SN visit       PT/OT   Evaluate with goal of regaining prior level of functioning   OT to evaluate if patient has [address] needs for personal care      PCP Visit scheduled within 7 days of hospital discharge      Patient's personal belongings (please select all that are sent with patient):  Glasses, Dentures partial, Jewelry, clothing    RN SIGNATURE:  Electronically signed by Jose Hurley RN on 11/24/21 at 10:11 AM EST    CASE MANAGEMENT/SOCIAL WORK SECTION    Inpatient Status Date: 11-    Readmission Risk Assessment Score:  Readmission Risk              Risk of Unplanned Readmission:  20         Discharging to Facility/ Agency   Name:  [facility] care    Address: [address] [place] [address], [address] [place] [address]  Phone: [phone]  Fax: [phone]      / signature: [place]     Case Management   [phone]    11/22/2021  2:23 PM      PHYSICIAN SECTION    Prognosis: Good    Condition at Discharge: Stable    Rehab Potential (if transferring to Rehab): Good    Recommended Labs or Other Treatments After Discharge: PT,OT,VN,HHA    Physician Certification: I certify the above information and transfer of Griselda Ali  is necessary for the continuing treatment of the diagnosis listed and that she requires Home Care for less 30 days.      Update Admission H&P: No change in H&P    PHYSICIAN SIGNATURE:  Electronically signed by Jackie Humphries MD on 11/23/21 at 8:30 AM EST

## 2021-11-22 NOTE — CARE COORDINATION
[facility]    Referral received from  to follow for home care services. I will follow for needs, and speak with patient to verify demos.     Bryon Cruz RN, BSN CTN  [facility] [phone]

## 2021-11-22 NOTE — PROGRESS NOTES
Occupational Therapy  Facility/Department: [address] IP REHAB  Daily Treatment Note  NAME: Cherelle Bowen  : 1929  MRN: [id number]    Date of Service: 2021    Discharge Recommendations:  S Level 1, Home with assist PRN  OT Equipment Recommendations  Other: has all needed DME at home    Assessment   Performance deficits / Impairments: Decreased functional mobility ; Decreased ADL status; Decreased high-level IADLs; Decreased balance; Decreased endurance; Decreased strength; Decreased safe awareness  Assessment: pt has met 2/5 STGs w/ OT intervention. She completed shower level bathing w/ set up using shower chair + GB. She completed oral care IND'ly while standing at sink. She is able to complete UB dressing IND'ly and LB dressing & toileting w SBA (occasional cues for thoroughness). She used RW from bed <toilet<shower chair w/ SBA, cues for safe hand placement. This pt is on target for [facility] Wed [facility]; encouraged wearing lifeline AATs (however she answered PREETHI questions appropriately). Treatment Diagnosis: impaired ADLs  Prognosis: Good  Decision Making: Medium Complexity  History: see chart  Exam: impaired ADLs, t/f, fxl mob  Assistance / Modification: SBA using RW, SBA w/ toileting  OT Education: ADL Adaptive Strategies  Patient Education: encouraged pt to wear lifeline AATs, able to state calling \"[phone]\" in case of emergency  Barriers to Learning: forgetful  REQUIRES OT FOLLOW UP: Yes  Activity Tolerance  Activity Tolerance: Patient Tolerated treatment well  Activity Tolerance: did not require extra time for bathing & dressing tasks; denied pain  Safety Devices  Safety Devices in place: Yes  Type of devices: Bed alarm in place; Call light within reach; Left in bed; Gait belt     PM session: met in therapy dept, she denies pain; agreeable for UE strengthening using 2 lb wts, completed 1 set of 10 for each of the 5 exercises for strengthening.  Pt is not able to state what medications she takes, but has \"6 or 7 of them\"--when prompting able to say she takes blood pressure & cholesterol meds, aspirin. Pt had difficulty opening containers, stated she uses \"flip tops\"OT had to open some of the bottles but she was able to place pills in each slot of organizer. Pt used 4ww thru therapy gym & kitchen area w/ SBA, able to go to fridge, grab milk & place on seat of 4ww & transport over to counter; also gathered cocoa & coffee mug, gave VCs to stand closer to objects to avoid reaching outside GARY. Pt had difficulty opening milk container and poured it into mug, spills noted. She was able to place & retrieve mug from microwave after heating milk up x 1 min. OT to try using RW in kitchen for reduced pace and positioning herself closer to objects. Pt returned to room, had to use toilet--completed toilet t/f w/ MI using GB; IND w/ clothing management & wiping. Ambulated to sink to wash hands & then returned to bed w/ MI. Pt's on target for DC on Wed. Left in bed, alarm on, call light in reacher. Tx time: 45 min Juan Antonio Zacarias, OTR/L #[id number]    Patient Diagnosis(es): There were no encounter diagnoses. has a past medical history of Arthritis, Endometrial thickening on ultrasound, Glaucoma, Hyperlipidemia, Hypertension, Pneumonia, Post-menopausal bleeding, and TIA (transient ischemic attack). has a past surgical history that includes Thyroidectomy, partial; Varicose vein surgery; Breast surgery; and Hip Arthroplasty (Left, 08/26/2016).     Restrictions  Restrictions/Precautions  Restrictions/Precautions: Fall Risk (High)  Position Activity Restriction  Other position/activity restrictions: reg diet, assume BLT precautions d/t back pain, contact precautions- MDRO  Subjective   General  Chart Reviewed: Yes, Progress Notes, History and Physical  Patient assessed for rehabilitation services?: Yes  Additional Pertinent Hx: Per Jesus Lewis MD's H&P : \"The patient is a 80 y.o. female with hx HTN, HLD who presents to [facility] with left lower back and hip pain. Patient states that she has had gradually worsening left lower back and hip pain over the past several days. She had a hip replacement several years ago. She describes the pain as sharp, 9/10 in severity, with rest making the pain better and weight bearing making the pain worse. She is struggling to ambulate due to the pain. She lives by herself at home, so she was worried and decided to come to the hospital for further evaluation. Denies fever, chills, chest pain, shortness of breath, abdominal pain, nausea, vomiting, constipation, diarrhea, and dysuria. In the ED, labs were significant for a sodium of 135, chloride of 98, glucose of 106. U/A showed evidence of UTI. CXR showed no acute process. CT Left Hip without contrast showed no acute abnormality, and no fracture or findings of prosthetic loosening with an incidental 3.1 cm distal AAA. CT Lumbar Spine without contrast showed no acute lumbar spine abnormality with severe multilevel degenerative changes without significant canal stenosis. \"  Response to previous treatment: Patient with no complaints from previous session  Family / Caregiver Present: No  Referring Practitioner: Dr Radha Solorzano  Diagnosis: Low back pain, left hip pain, debility, UTI  Subjective  Subjective: met in room, pt resting quietly in bed, denies pain  General Comment  Comments: agreeable for shower      Orientation  Orientation  Overall Orientation Status: Within Normal Limits  Orientation Level: Oriented X4  Objective    ADL  Feeding: Independent  Grooming: Independent (stands to perform oral care)  UE Bathing: Setup (while seated on shower chair, OT turned on water; she adjusted temp & turned off when finished; she is IND w/ bathing UB after set up)  LE Bathing: Setup (while seated on shower chair, she needed set up to wash, rinse & dry LB; stands to clean brittaney area using GB)  UE Dressing: Independent (transported clothes to bathrm, is IND w/ doff/donning bra & shirt while seated)  LE Dressing: Stand by assistance (safety concerns when standing to manage clothing over hips, set up for donning shoes)  Toileting: Stand by assistance; Increased time to complete (cues for thoroughness)  Additional Comments: discussed use of bidet system for better brittaney hygiene, less stool smears noted today        Balance  Sitting Balance: Modified independent   Standing Balance: Stand by assistance  Standing Balance  Time: @ 2-3 minutes  Activity: during grooming  Comment: using support of RW, sink or GB (had prior back & hip issues which limited standing tolerance)  Functional Mobility  Functional - Mobility Device: Rolling Walker  Activity: Retrieve items; To/from bathroom; Transport items  Assist Level: Stand by assistance  Functional Mobility Comments: bed >< bathroom with RW SBA/SUP, placed pants over front of RW after gathered from drawer. No LOB or signs of fatigue  Toilet Transfers  Toilet - Technique: Ambulating  Equipment Used: Grab bars  Toilet Transfer: Stand by assistance  Toilet Transfers Comments: cues & mild unsteadiness d/t L hip & back weakness  Shower Transfers  Shower - Transfer From: Melven Najjar - Transfer Type: To  Shower - Transfer To:  Shower seat with back  Shower - Technique: Ambulating  Shower Transfers: Stand by assistance  Shower Transfers Comments: close SBA to use RW then transition hands to GB, moves impulsively during SPT shower chair<w/c  Wheelchair Bed Transfers  Wheelchair/Bed - Technique: Ambulating  Equipment Used: Bed; Wheelchair  Level of Asssistance: Stand by assistance  Wheelchair Transfers Comments: close SB/cues for safe hand placement especially w L hand, RW  Bed mobility  Rolling to Left: Modified independent  Rolling to Right: Modified independent  Supine to Sit: Modified independent  Sit to Supine: Modified independent  Scooting: Modified independent  Comment: semi reclined hosp bed  Transfers  Sit to stand: Stand by assistance  Stand to sit: Stand by assistance  Transfer Comments: to/from RW                    Vision  Patient Visual Report: Blurring of print when reading  Vision Comment: low vision R eye (glaucoma)  Cognition  Overall Cognitive Status: WNL  Cognition Comment: cooperative, follows commands but is a little impulsive, forgetful; has good sense of humor                                         Plan   Plan  Times per week: 5-6  Times per day: Twice a day  Plan weeks: 5-7 days  Specific instructions for Next Treatment: kitchen tasks, medication managment  Current Treatment Recommendations: Functional Mobility Training, Balance Training, Strengthening, Endurance Training, Self-Care / ADL, Safety Education & Training, Equipment Evaluation, Education, & procurement, Home Management Training       Goals  Short term goals  Time Frame for Short term goals: by 5 days pt will complete  Short term goal 1: Grooming while standing at sink IND'ly--MET  Short term goal 2: UB dressing IND--MET  Short term goal 3: LB dressing IND (loan A/E if indicated)  Short term goal 4: toilet t/f IND'ly using RW  Short term goal 5: toilet tasks IND'ly  Long term goals  Time Frame for Long term goals : by 5-7 days pt will complete  Long term goal 1: shower level bathing w/ MI using shower chair  Long term goal 2: improve standing tolerance x 8-10 minutes during simple meal prep/IADLs  Patient Goals   Patient goals : \"be able to move around a little better\"       Therapy Time   Individual Concurrent Group PM-treatment   Time In 1005      1435   Time Out 1115      1520   Minutes 70      45   Timed Code Treatment Minutes: JENNIFER Oneil/L #[id number]

## 2021-11-22 NOTE — CARE COORDINATION
Team Conference held today. Team reviewed progress and goals. Team reports she is ready for DC to home with home care orders for sn/pt/ot/hha. Team recommends a bidet for self cleaning ease. Team recommends a second wh walker at home for both floors. DC to home on Wed 11-. Met with patient and son to review. They are in agreement with this plan. IMM letter presented. She wants to use [facility] as she used them previously. Son will transport her 10 - 12 noon on Wed. She does NOT need another wh walker, she states she has two of them already. She welcomed the suggestion of a bidet seat for her toilet. She will look into it. [facility]ferral sent to [facility].   [facility]     Case Management   [phone]    11/22/2021  2:22 PM

## 2021-11-23 PROCEDURE — 94761 N-INVAS EAR/PLS OXIMETRY MLT: CPT

## 2021-11-23 PROCEDURE — 97116 GAIT TRAINING THERAPY: CPT

## 2021-11-23 PROCEDURE — 6360000002 HC RX W HCPCS: Performed by: PHYSICAL MEDICINE & REHABILITATION

## 2021-11-23 PROCEDURE — 97110 THERAPEUTIC EXERCISES: CPT

## 2021-11-23 PROCEDURE — 97530 THERAPEUTIC ACTIVITIES: CPT

## 2021-11-23 PROCEDURE — 6370000000 HC RX 637 (ALT 250 FOR IP): Performed by: PHYSICAL MEDICINE & REHABILITATION

## 2021-11-23 PROCEDURE — 1280000000 HC REHAB R&B

## 2021-11-23 PROCEDURE — 97535 SELF CARE MNGMENT TRAINING: CPT

## 2021-11-23 RX ADMIN — AMLODIPINE BESYLATE 5 MG: 5 TABLET ORAL at 07:48

## 2021-11-23 RX ADMIN — ACETAMINOPHEN 650 MG: 325 TABLET ORAL at 20:13

## 2021-11-23 RX ADMIN — ACETAMINOPHEN 650 MG: 325 TABLET ORAL at 11:17

## 2021-11-23 RX ADMIN — PANTOPRAZOLE SODIUM 40 MG: 40 TABLET, DELAYED RELEASE ORAL at 05:32

## 2021-11-23 RX ADMIN — ASPIRIN 81 MG: 81 TABLET, CHEWABLE ORAL at 07:48

## 2021-11-23 RX ADMIN — Medication 3 MG: at 20:13

## 2021-11-23 RX ADMIN — LEVOTHYROXINE SODIUM 75 MCG: 0.07 TABLET ORAL at 05:32

## 2021-11-23 RX ADMIN — HEPARIN SODIUM 5000 UNITS: 5000 INJECTION INTRAVENOUS; SUBCUTANEOUS at 20:13

## 2021-11-23 RX ADMIN — DORZOLAMIDE HYDROCHLORIDE AND TIMOLOL MALEATE 1 DROP: 20; 5 SOLUTION/ DROPS OPHTHALMIC at 20:17

## 2021-11-23 RX ADMIN — ATORVASTATIN CALCIUM 10 MG: 10 TABLET, FILM COATED ORAL at 07:48

## 2021-11-23 RX ADMIN — CLOBETASOL PROPIONATE: 0.5 CREAM TOPICAL at 07:50

## 2021-11-23 RX ADMIN — METHOCARBAMOL 750 MG: 750 TABLET ORAL at 14:20

## 2021-11-23 RX ADMIN — HEPARIN SODIUM 5000 UNITS: 5000 INJECTION INTRAVENOUS; SUBCUTANEOUS at 07:49

## 2021-11-23 RX ADMIN — LATANOPROST 1 DROP: 50 SOLUTION OPHTHALMIC at 21:00

## 2021-11-23 RX ADMIN — CLOBETASOL PROPIONATE: 0.5 CREAM TOPICAL at 20:17

## 2021-11-23 RX ADMIN — LISINOPRIL 20 MG: 20 TABLET ORAL at 07:48

## 2021-11-23 RX ADMIN — Medication 1 CAPSULE: at 07:48

## 2021-11-23 RX ADMIN — SENNOSIDES AND DOCUSATE SODIUM 1 TABLET: 50; 8.6 TABLET ORAL at 07:48

## 2021-11-23 RX ADMIN — DORZOLAMIDE HYDROCHLORIDE AND TIMOLOL MALEATE 1 DROP: 20; 5 SOLUTION/ DROPS OPHTHALMIC at 07:50

## 2021-11-23 RX ADMIN — PREDNISONE 10 MG: 10 TABLET ORAL at 07:48

## 2021-11-23 ASSESSMENT — PAIN DESCRIPTION - LOCATION: LOCATION: GENERALIZED

## 2021-11-23 ASSESSMENT — PAIN SCALES - GENERAL
PAINLEVEL_OUTOF10: 0
PAINLEVEL_OUTOF10: 5
PAINLEVEL_OUTOF10: 0
PAINLEVEL_OUTOF10: 3

## 2021-11-23 ASSESSMENT — PAIN DESCRIPTION - DESCRIPTORS: DESCRIPTORS: ACHING;DISCOMFORT

## 2021-11-23 ASSESSMENT — PAIN DESCRIPTION - PAIN TYPE: TYPE: CHRONIC PAIN

## 2021-11-23 ASSESSMENT — PAIN DESCRIPTION - FREQUENCY: FREQUENCY: INTERMITTENT

## 2021-11-23 ASSESSMENT — PAIN - FUNCTIONAL ASSESSMENT: PAIN_FUNCTIONAL_ASSESSMENT: ACTIVITIES ARE NOT PREVENTED

## 2021-11-23 ASSESSMENT — PAIN DESCRIPTION - PROGRESSION: CLINICAL_PROGRESSION: NOT CHANGED

## 2021-11-23 ASSESSMENT — PAIN DESCRIPTION - ONSET: ONSET: ON-GOING

## 2021-11-23 NOTE — PROGRESS NOTES
Physical Therapy  PHYSICAL THERAPY  Progress Note   Second Session    Patient Name: Eun Chaparro  Medical Record Number: [id number]    Treatment Diagnosis: impaired mobility      Chart Reviewed: Yes   Restrictions/Precautions: Fall Risk (High) Other position/activity restrictions: reg diet, assume BLT precautions d/t back pain, contact precautions- MDRO   Additional Pertinent Hx: Per Dr. Adin Leonard is a 79 yo F with pmh HTN, HLD, TIA, OA s/p left VITO who initially presented 11/13/2021 with low back and left hip pain. Symptoms started ~1 week ago with no clear inciting event. She has had persistent difficulty with ambulation and progressive weakness/fatigue which prompted her to present to ED. She was found to have klebsiella/proteus UTI. CT left hip negative for acute abnormality or hardware complications. CT lumbar spine showed severe multilevel degenerative changes with no significant canal stenosis. She was seen by Ortho. She is being treated with steroid taper. Now presents to ARU with impaired mobility and self-care below her baseline. Currently, patient reports some improvement in her low back and left hip pain. Pain is localized to the left low back/upper buttock and lateral left hip. Described as sharp. Worse with movement and weight bearing. Improves with rest. She denies tingling/numbness or focal weakness\"        Subjective: Pt reports feeling good and eager to be d/c home tomorrow (11/24). Pt denies any pain or complaints at this time after having an aspirin \"a little bit ago\".     Objective  Supine to sitting EOB with log roll maneuver Patel  Sit> stand Patel   amb to restroom with RW Patel  Sit<>stand from RW to commode Patel. ind for pants/brief management and wiping  amb to sink and washed hands Patel  Amb 150' with RW Patel  Sit<>stand to low bench without armrests Patel  amb 80' Patel with RW  Nustep with BL LE, load 2, 6.5 minutes, 247 steps, limited due to L hip pain  HEP thoroughly explained and provided to pt. Pt confirmed comfort and understanding of all exercises provided    Assessment: Pt tolerated treatment well this PM. She was able to perform functional mobility and ambulate community distances Yovani RW . Pt showed good understanding and safety with provided HEP. Pt denied any questions or concerns regardding her dc tomorrow. She has met 3/5 rehabilitation goals. Anticipated D/C to home with home PT tomorrow (11/24).       Safety Device - Type of devices:  [x]  All fall risk precautions in place [] Bed alarm in place  [] Call light within reach [] Chair alarm in place [] Positioning belt [x] Gait belt [x] Patient at risk for falls [] Left in bed [] Left in chair [] Telesitter in use [] Sitter present [] Nurse notified [x]  Left with OT in therapy gym      Therapy Time   Individual Co-treatment   Time In 1300     Time Out 1345     Minutes 45         Electronically signed by Yenny Mckeon, MOISES [id number] 11/23/2021 at 4:41 PM

## 2021-11-23 NOTE — PROGRESS NOTES
Comprehensive Nutrition Assessment    Type and Reason for Visit:  Initial, Positive Nutrition Screen    Nutrition Recommendations/Plan:   Regular diet   Will monitor nutritional adequacy, nutrition-related labs, weights, BMs, and clinical progress     Nutrition Assessment:  Follow-up. Pt with d/c orders, d/c planned for tomorrow. On Regular diet with varied po intake. No nutritional concerns for d/c. Malnutrition Assessment:  Malnutrition Status:  No malnutrition    Context:  Chronic Illness       Estimated Daily Nutrient Needs:  Energy (kcal):  5933-5975 (30-35 x 46 CBW); Weight Used for Energy Requirements:  Current     Protein (g):  69-83 ( 1.5-1.8 x 46 CBW); Weight Used for Protein Requirements:  Current        Fluid (ml/day):  per provider; Method Used for Fluid Requirements:         Nutrition Related Findings:  LBM 11/21      Wounds:   (some redness)       Current Nutrition Therapies:    ADULT DIET; Regular    Anthropometric Measures:  · Height: 5' 5\" (165.1 cm)  · Current Body Weight: 101 lb (45.8 kg)   · Usual Body Weight: 105 lb (47.6 kg)     · Ideal Body Weight: 125 lbs; % Ideal Body Weight     · BMI: 16.8  · Adjusted Body Weight:  ; No Adjustment   · BMI Categories: Underweight (BMI less than 22) age over 72       Nutrition Diagnosis:   · Inadequate oral intake related to inadequate protein-energy intake as evidenced by BMI      Nutrition Interventions:   Food and/or Nutrient Delivery:  Continue Current Diet  Nutrition Education/Counseling:  No recommendation at this time   Coordination of Nutrition Care:  Continue to monitor while inpatient    Goals:  >50% of meals consumed       Nutrition Monitoring and Evaluation:   Behavioral-Environmental Outcomes:  None Identified   Food/Nutrient Intake Outcomes:  Food and Nutrient Intake  Physical Signs/Symptoms Outcomes:  Biochemical Data, Fluid Status or Edema, Nutrition Focused Physical Findings, Skin, Weight     Discharge Planning:     Too soon to determine     Electronically signed by Carolynn Edmonds RD, MARSHALL on 11/23/21 at 10:31 AM EST    Contact: [phone]

## 2021-11-23 NOTE — CARE COORDINATION
Gowned and gloved to meet with patient to discuss the recommendation for wh walker for home use. Yesterday she told me she had two of them. Today she reports she uses a 2 wh walker upstairs and a 4 wh walker downstairs. Informed her of recommendation for 2 wh walker. She reports she is open to it if her insurance covers it. Referral made to [employer] rep.   [place], [place]     Case Management   [phone]    11/23/2021  4:40 PM

## 2021-11-23 NOTE — PLAN OF CARE
Problem: Falls - Risk of:  Goal: Will remain free from falls  Description: Will remain free from falls  Outcome: Ongoing  Note: Assessment completed. Fall precautions in place. Uses walker x 1 for transfers. Non skid footwear and armband on. Bed/chair alarms in place and functioning. Personal items and call light within reach. Monitored frequently. Goal: Absence of physical injury  Description: Absence of physical injury  Outcome: Ongoing     Problem: Skin Integrity:  Goal: Will show no infection signs and symptoms  Description: Will show no infection signs and symptoms  Outcome: Ongoing  Note: Assessment completed. No sign or symptoms of infection noted. Will continue to monitor.    Goal: Absence of new skin breakdown  Description: Absence of new skin breakdown  Outcome: Ongoing

## 2021-11-23 NOTE — PROGRESS NOTES
Physical Therapy  Facility/Department: [address] IP REHAB  Daily Treatment Note/Discharge Summary  NAME: Lauren Meeks  : 1929  MRN: [id number]    Date of Service: 2021    Discharge Recommendations:  Patient would benefit from continued therapy after discharge, Home with Home health PT, Home with assist PRN   PT Equipment Recommendations  Equipment Needed: No    HOME HEALTH CARE: LEVEL 1 STANDARD     -Initial home health evaluation to occur within 24-48 hours, in patient home    -Home health agency to establish plan of care for patient over 60 day period    -Medication Reconciliation    -PCP Visit scheduled within seven days of discharge    -PT/OT to evaluate with goal of regaining prior level of functioning    -OT to evaluate if patient has [address] needs for personal care       Assessment   Body structures, Functions, Activity limitations: Decreased functional mobility ; Decreased ADL status; Decreased strength; Decreased balance; Decreased endurance; Increased pain; Decreased posture; Decreased safe awareness  Assessment: Pt tolerated treatment well this AM as  indidcated by ability to perform functional mobility and ambulate community distances SBA-Yovani. She is steady with Rollator and independent with managing breaks without cueing. Pt did not complain of pain with mobility this date. She has met 3/5 rehabilitation goals. Anticipated D/C to home with home PT tomorrow (). Treatment Diagnosis: impaired mobility  Prognosis: Good  PT Education: PT Role; Plan of Care; Functional Mobility Training; General Safety; Gait Training; Transfer Training  Patient Education: D/C planning  Activity Tolerance  Activity Tolerance: Patient Tolerated treatment well     Patient Diagnosis(es): There were no encounter diagnoses.      has a past medical history of Arthritis, Endometrial thickening on ultrasound, Glaucoma, Hyperlipidemia, Hypertension, Pneumonia, Post-menopausal bleeding, and TIA (transient ischemic attack). has a past surgical history that includes Thyroidectomy, partial; Varicose vein surgery; Breast surgery; and Hip Arthroplasty (Left, 08/26/2016). Social/Functional History  Lives With: Alone  Type of Home: House  Home Layout: Two level, 1/2 bath on main level, Bed/Bath upstairs, Laundry in basement  Home Access: Stairs to enter without rails  Entrance Stairs - Number of Steps: 1+1 garage entry to main level  Bathroom Shower/Tub: Walk-in shower, Shower chair with back  [employer]: Standard  Bathroom Equipment: Grab bars in shower, Hand-held shower  Bathroom Accessibility: Walker accessible  Home Equipment: Rolling walker, Cane, 4 wheeled walker, Reacher, Sock aid, Alert Button  ADL Assistance: [facility]: Needs assistance (children perform all. Pt can do light meal prep)  Ambulation Assistance: Independent (with SPC vs RW in home, rollator outside to get mail)  Transfer Assistance: Independent  Active : No  Patient's  Info: Family  Occupation: Retired  Type of occupation:   Leisure & Hobbies: watch tv, does exercises at home 2xs a wk  IADL Comments: family assists w/ laundry; has 6 children, gets help mostly every other day  Additional Comments: Denies recent falls, sleeps in reg bed    Restrictions  Restrictions/Precautions  Restrictions/Precautions: Fall Risk (High)  Position Activity Restriction  Other position/activity restrictions: reg diet, assume BLT precautions d/t back pain, contact precautions- MDRO  Subjective   General  Chart Reviewed: Yes  Additional Pertinent Hx: Per Dr. Nolberto Thornton is a 81 yo F with pmh HTN, HLD, TIA, OA s/p left VITO who initially presented 11/13/2021 with low back and left hip pain. Symptoms started ~1 week ago with no clear inciting event. She has had persistent difficulty with ambulation and progressive weakness/fatigue which prompted her to present to ED. She was found to have klebsiella/proteus UTI.  CT left hip negative for acute abnormality or hardware complications. CT lumbar spine showed severe multilevel degenerative changes with no significant canal stenosis. She was seen by Ortho. She is being treated with steroid taper. Now presents to ARU with impaired mobility and self-care below her baseline. Currently, patient reports some improvement in her low back and left hip pain. Pain is localized to the left low back/upper buttock and lateral left hip. Described as sharp. Worse with movement and weight bearing. Improves with rest. She denies tingling/numbness or focal weakness\"  Response To Previous Treatment: Patient with no complaints from previous session. Family / Caregiver Present: No  Referring Practitioner: Dr. Lucas De Jesus: Pt reports feeling good this AM and confident and eager to be d/c home tomorrow (11/23). General Comment  Comments: Pt seated in w/c in therapy gym        Objective   Bed mobility  Bridging: Modified independent   Rolling to Left: Modified independent  Rolling to Right: Modified independent  Supine to Sit: Modified independent  Sit to Supine: Modified independent  Scooting: Modified independent  Comment: in ADL bed  Transfers  Sit to Stand: Modified independent  Stand to sit: Modified independent  Car Transfer: Modified independent (with Rollator)  Ambulation  Ambulation?: Yes  Ambulation 1  Surface: level tile; carpet; uneven  Device: Rollator  Assistance: Modified Independent  Quality of Gait: Narrow GARY. decreased stride length, with step through pattern; toe out on L foot and antalgic gait on L with increased fatigue  Gait Deviations: Decreased step length; Decreased step height; Slow Jacque  Distance: 210' x 2 with several turns over level surface, carpet, and dorsills; short distances in therapy gym  Comments:  Indep with rollator/break management  Stairs/Curb  Stairs?: Yes  Stairs  # Steps : 12  Stairs Height: 6\"  Rails: Bilateral  Curbs: 6\"  Device: 4 wheeled walker; No Device (rollator for curb step)  Assistance: Supervision; Stand by assistance  Comment: Close SBA for curb step with Rollator since pt had not practiced this before     Balance  Posture: Good  Sitting - Static: Good  Sitting - Dynamic: Good  Standing - Static: Good  Standing - Dynamic: Good  Comments: Indep picking up object with reachers standing at [employer]        Goals  Short term goals  Time Frame for Short term goals: 5-7 days  Short term goal 1: bed mobility with independent- met 11/22  Short term goal 2: transfers with MI- met 11/22 needs safety cues  Short term goal 3: ambulate community distances with MI- met 11/22  Short term goal 4: ascend / descend curb step with wheeled with SBA  Short term goal 5: ascend/descend 12 steps with Mi  Long term goals  Time Frame for Long term goals : STG= LTG  Patient Goals   Patient goals : Patient's goal is to go home and take care of yourself  Pt. Met 3/5 short term goals /long term goals.        Plan    Plan  Times per week: 5-6 x  Times per day: Twice a day  Plan weeks: 1 week  Specific instructions for Next Treatment: work on endurance  Current Treatment Recommendations: Strengthening, Functional Mobility Training, Transfer Training, Balance Training, Gait Training, Stair training, Neuromuscular Re-education, Patient/Caregiver Education & Training, Safety Education & Training, Equipment Evaluation, Education, & procurement, Endurance Training, Home Exercise Program, Pain Management  Plan Comment: Anticipated D/C to home with home health PT tomorrow (11/24)  Safety Devices  Type of devices: Gait belt, Patient at risk for falls, Left in chair, All fall risk precautions in place (Pt left seated in  w/c in therapy gym awaiting transport back to room)  Restraints  Initially in place: No     Therapy Time   Individual Concurrent Group Co-treatment   Time In 0815         Time Out 0900         Minutes 45         Timed Code Treatment Minutes: 45 Minutes Electronically signed by Al Dejesus on 11/23/21 at 12:16 PM EST  Therapist was present, directed the patient's care, made skilled judgement, and was responsible for assessment and treatment of the patient.         Electronically signed by Juan Marshall PT on 11/23/21 at 12:29 PM EST

## 2021-11-23 NOTE — PROGRESS NOTES
Department of Physical Medicine & Rehabilitation  Progress Note    Patient Identification:  Darryle Oris  [id number]  : 1929  Admit date: 2021    Chief Complaint: Debility    Subjective:  Patient seen this Aliza Veras was discussed yesterday in our weekly rehab conference with the entire rehab team, and we think the patient will be ready for discharge to home tomorrow. She has made good improvement in her therapies, and just needs standby to contact-guard assist for transfers, gait, and her ADLs. Patient does live at home alone but has help from her children 3-4 times a week. We will plan discharge to home tomorrow with continued PT, OT, and home health aide at discharge, and also set her up with a rolling walker at discharge. Repeat labs yesterday look good and are stable. I will fill out her EMELINA and meds today for her discharge tomorrow. ROS: No f/c, n/v, cp     Objective:  Patient Vitals for the past 24 hrs:   BP Temp Temp src Pulse Resp SpO2 Weight   21 0748 127/70 -- -- -- -- -- --   21 0604 (!) 138/94 98 °F (36.7 °C) Oral 82 16 98 % 101 lb 10.1 oz (46.1 kg)   21 1645 105/62 98.1 °F (36.7 °C) Oral 93 16 97 % --     Const: Alert. No distress, pleasant. HEENT: Normocephalic, atraumatic. Normal sclera/conjunctiva. MMM. CV: Regular rate and rhythm. Resp: No respiratory distress. Lungs CTAB. Abd: Soft, nontender, nondistended, NABS+   Ext: No edema. MSK: +Kyphoscoliosis, decreased spine and hip ROM. Neuro: Alert, oriented, appropriately interactive. Psych: Cooperative, appropriate mood and affect    Laboratory data: Available via EMR. Last 24 hour lab  No results found for this or any previous visit (from the past 24 hour(s)).     Therapy progress:  PT  Position Activity Restriction  Other position/activity restrictions: reg diet, assume BLT precautions d/t back pain, contact precautions- MDRO  Objective     Sit to Stand: Modified independent  Stand to sit: Modified independent  Bed to Chair: Modified independent  Device: Rolling Walker  Assistance: Modified Independent  Distance: 180' with several turns, 79' over carpet threshold and turns  OT  PT Equipment Recommendations  Equipment Needed: No  Other: Patient reports to use rollato on first floor  Toilet - Technique: Ambulating  Equipment Used: Grab bars  Toilet Transfers Comments: cues & mild unsteadiness d/t L hip & back weakness  Assessment        SLP          Body mass index is 16.91 kg/m². Rehabilitation Diagnosis:   Orthopedic, 8.9, Other Orthopedic        Assessment and Plan:     Impairments: generalized weakness, decreased spine/hip ROM, balance, endurance     Lumbar spondylosis and degenerative disc disease  -Pain improving significantly with prednisone taper  -PT/OT     Debility   -PT/OT to address endurance, strength, compensatory strategies, equipment     Klebsiella/proteus UTI  -Received ceftriaxone on acute floor --> transition to ciprofloxacin based on sensitivities.     Hyponatremia  -Possibly low solute/hypovolemic hyponatremia  -Encourage po intake and monitor -- improving    Hypokalemia  -Replaced     HTN  -amlodipine, lisinopril     HLD  -atorvastatin     Hypothyroidism  -levothyroxine     GERD  -pantoprazole     H/o OA s/p left VITO  -CT negative for fracture or hardware malfunction     Bladder   -High risk retention   -Monitor PVRs, SC prn >300cc     Bowel   -High risk constipation   -senna+colace BID, PRN miralax, MoM, and bisacodyl supp.     Safety   -fall precautions     Pain control  -acetaminophen and methocarbamol prn     PPx  -DVT: heparin  -GI: pantoprazole       Rehab Progress: Making progress. Working on functional mobility, balance, compensatory strategies for ADLs, safety.   Anticipated Dispo: home alone, family checks in 2 days/week, looking into aide services  Services: HH PT, OT, ANUP, RN  DME: GONZALEZ, Shower chair  ELOS: 11/24      Electronically signed by Antony Alfonso MD on 11/23/2021 at 7:58 AM

## 2021-11-23 NOTE — PROGRESS NOTES
Occupational Therapy  Facility/Department: [address] IP REHAB  Daily Treatment Note  NAME: Angle Canales  : 1929  MRN: [id number]    Date of Service: 2021    Discharge Recommendations:  S Level 1, Home with assist PRN  OT Equipment Recommendations  Other: has all needed DME at home    Assessment   Performance deficits / Impairments: Decreased functional mobility ; Decreased ADL status; Decreased high-level IADLs; Decreased balance; Decreased endurance; Decreased strength; Decreased safe awareness  Assessment: pt continues to improve her overall transfers & fxl mobility skills using RW. Today she was able to ambulate in kitchen, retrieve juice from fridge, place into basket & transport over to counter and then to table; no LOB. Showing improvement to get closer to objects to reduce reaching outside GARY & fall risk. She completed toileting & transfer IND;ly; able to ambulate thru bathrm, wash hands at sink; she has improved standing tolerance x 5 minutes. Pt on target to return home on Wed w/ family support & HH services  Treatment Diagnosis: impaired ADLs  Prognosis: Good  Decision Making: Medium Complexity  History: see chart  Exam: impaired ADLs, t/f, fxl mob  Assistance / Modification: MI using RW, SBA w/ toileting  OT Education: IADL Safety  Patient Education: use of walker basket to safely transport items in kitchen  Barriers to Learning: forgetful  REQUIRES OT FOLLOW UP: Yes  Activity Tolerance  Activity Tolerance: Patient Tolerated treatment well  Activity Tolerance: can be forgetful of safe hand placement, is cooperative  Safety Devices  Safety Devices in place: Yes  Type of devices: Bed alarm in place; Call light within reach; Left in bed; Gait belt       PM session: pt just finished PT session, she is reporting 7/10 LBP, already had Tylenol. Attempted medicine ball activities for core/upper torso strengthening however could not tolerate the pain.  Pt completed car t/f w/ MI, slow to move d/t LBP. She was returned to her room--had to use toilet--able to ambulate in/out of bathrm & complete toilet t/f w/ MI; walks to sink to wash hands, returned to bed and completed bed mob w/ MI. RN aware she is having back pain--she provided Robaxin, OT provided warm blanket for pain relief. Pt on target for returning home tomorrow; will see in AM for shower & DC planning. Tx time: 45 min Delvin Cox, OTR/L #[id number]    Patient Diagnosis(es): There were no encounter diagnoses. has a past medical history of Arthritis, Endometrial thickening on ultrasound, Glaucoma, Hyperlipidemia, Hypertension, Pneumonia, Post-menopausal bleeding, and TIA (transient ischemic attack). has a past surgical history that includes Thyroidectomy, partial; Varicose vein surgery; Breast surgery; and Hip Arthroplasty (Left, 08/26/2016). Restrictions  Restrictions/Precautions  Restrictions/Precautions: Fall Risk (High)  Position Activity Restriction  Other position/activity restrictions: reg diet, assume BLT precautions d/t back pain, contact precautions- MDRO  Subjective   General  Chart Reviewed: Yes, Progress Notes, History and Physical  Patient assessed for rehabilitation services?: Yes  Additional Pertinent Hx: Per Linda Doyle MD's H&P : \"The patient is a 80 y.o. female with hx HTN, HLD who presents to [facility] with left lower back and hip pain. Patient states that she has had gradually worsening left lower back and hip pain over the past several days. She had a hip replacement several years ago. She describes the pain as sharp, 9/10 in severity, with rest making the pain better and weight bearing making the pain worse. She is struggling to ambulate due to the pain. She lives by herself at home, so she was worried and decided to come to the hospital for further evaluation. Denies fever, chills, chest pain, shortness of breath, abdominal pain, nausea, vomiting, constipation, diarrhea, and dysuria.  In the ED, labs were significant for a sodium of 135, chloride of 98, glucose of 106. U/A showed evidence of UTI. CXR showed no acute process. CT Left Hip without contrast showed no acute abnormality, and no fracture or findings of prosthetic loosening with an incidental 3.1 cm distal AAA. CT Lumbar Spine without contrast showed no acute lumbar spine abnormality with severe multilevel degenerative changes without significant canal stenosis. \"  Response to previous treatment: Patient with no complaints from previous session  Family / Caregiver Present: No  Referring Practitioner: Dr Marylee Chestnut  Diagnosis: Low back pain, left hip pain, debility, UTI  Subjective  Subjective: met in therapy dept, reporting 5/10 lower back & L sided pain, RN aware  General Comment  Comments: agreeable for UE strengthening & kitchen tasks      Orientation  Orientation  Overall Orientation Status: Within Normal Limits  Orientation Level: Oriented X4  Objective       Instrumental ADL's  Instrumental ADLs: Yes  Meal Prep  Meal Prep Level: Walker  Meal Prep Level of Assistance: Modified independent  Meal Preparation: used RW in kitchen, able to position herself closer to objects when retrieving juice from fridge, placed in basket & tranported over to counter; able to pour juice into cup, put in cupholder in basket & transport over to table; she sat in reg chair w/ MI and drank the juice     Balance  Sitting Balance: Modified independent   Standing Balance: Modified independent   Standing Balance  Time: @ 3-4 minutes  Activity: during simple kitchen tasks  Comment: using support of RW  Functional Mobility  Functional - Mobility Device: Rolling Walker  Activity: Retrieve items; Transport items  Assist Level: Modified independent   Functional Mobility Comments: used RW in [facility], retrieved & transported items using basket; no LOB, improved recall to position herself closer to objects to reduce fall risk  Toilet Transfers  Toilet - Technique: Ambulating  Equipment Used: Grab bars  Toilet Transfer: Modified independent  Toilet Transfers Comments: using RW, mild limping thru L hip  Wheelchair Bed Transfers  Wheelchair/Bed - Technique: Ambulating  Equipment Used: Bed; Wheelchair  Level of Asssistance: Modified independent   Wheelchair Transfers Comments: improved ability to get closer prior to backing up to surfaces, forgetfulnes of safe hand placement but on LOB  Bed mobility  Rolling to Left: Modified independent  Rolling to Right: Modified independent  Supine to Sit: Modified independent  Sit to Supine: Modified independent  Comment: flat bed, used rail  Transfers  Sit to stand: Modified independent  Stand to sit: Modified independent  Transfer Comments: to/from RW                    Vision  Patient Visual Report: Blurring of print when reading  Vision Comment: low vision R eye (glaucoma)  Cognition  Overall Cognitive Status: WNL  Cognition Comment: cooperative, follows commands but is a little impulsive, forgetful/impaird STM; has good sense of humor                    Type of ROM/Therapeutic Exercise  Type of ROM/Therapeutic Exercise: Free weights  Comment: using 2 lb wts for the following strengthening exercises to improve IND w/ sit<>stand transitions  Exercises  Shoulder Elevation: x 15 shld shrugs  Shoulder Flexion: x 5  Horizontal ABduction: x 10  Horizontal ADduction: x 10  Elbow Flexion: x 15  Elbow Extension: x 15  Supination: x 15  Pronation: x 15  Wrist Flexion: x 20 no wts  Wrist Extension: x 20 no wts  Finger Extension: x 20 no wts  Grasp/Release: 2 sets of 20 using 3 lb gripper  Other: x 10 abdominal squeezes for core strengthening to support balance/back                    Plan   Plan  Times per week: 5-6  Times per day: Twice a day  Plan weeks: [facility]  Specific instructions for Next Treatment: kitchen tasks, medication managment  Current Treatment Recommendations: Functional Mobility Training, Balance Training, Strengthening, Endurance Training, Self-Care / ADL, Safety Education & Training, Equipment Evaluation, Education, & procurement, Home Management Training    Goals  Short term goals  Time Frame for Short term goals: by 5 days pt will complete  Short term goal 1: Grooming while standing at sink IND'ly--MET  Short term goal 2: UB dressing IND--MET  Short term goal 3: LB dressing IND (loan A/E if indicated)  Short term goal 4: --MET  Short term goal 5: toilet tasks IND'ly--MET  Long term goals  Time Frame for Long term goals : by 5-7 days pt will complete  Long term goal 1: shower level bathing w/ MI using shower chair  Long term goal 2: improve standing tolerance x 8-10 minutes during simple meal prep/IADLs--ongoing  Patient Goals   Patient goals : \"be able to move around a little better\"       Therapy Time   Individual Concurrent Group PM-treatment   Time In [phone]   Time Out 1115      1430   Minutes 45      45   Timed Code Treatment Minutes: [facility] [place], OTR/L #[id number]

## 2021-11-23 NOTE — PROGRESS NOTES
Patient admitted with lumbar spondylosis and degenerated disc disease. A&O x4. Contact isolation for MDRO in urine. Transfers with FWW x 1 SBA. Regular diet tolerated well. Medications whole with thins. No complaints of pain noted. Continent of bowel and bladder. LBM 11/21. Refusing senna. Bed alarm engaged. Call light and bedside table within reach. Frequent rounding for safety continues.

## 2021-11-24 VITALS
WEIGHT: 101.41 LBS | TEMPERATURE: 97.5 F | HEART RATE: 86 BPM | RESPIRATION RATE: 16 BRPM | DIASTOLIC BLOOD PRESSURE: 76 MMHG | SYSTOLIC BLOOD PRESSURE: 141 MMHG | HEIGHT: 65 IN | BODY MASS INDEX: 16.9 KG/M2 | OXYGEN SATURATION: 98 %

## 2021-11-24 PROCEDURE — 6360000002 HC RX W HCPCS: Performed by: PHYSICAL MEDICINE & REHABILITATION

## 2021-11-24 PROCEDURE — 6370000000 HC RX 637 (ALT 250 FOR IP): Performed by: PHYSICAL MEDICINE & REHABILITATION

## 2021-11-24 PROCEDURE — 90686 IIV4 VACC NO PRSV 0.5 ML IM: CPT | Performed by: PHYSICAL MEDICINE & REHABILITATION

## 2021-11-24 PROCEDURE — 97535 SELF CARE MNGMENT TRAINING: CPT

## 2021-11-24 PROCEDURE — G0008 ADMIN INFLUENZA VIRUS VAC: HCPCS | Performed by: PHYSICAL MEDICINE & REHABILITATION

## 2021-11-24 RX ADMIN — HEPARIN SODIUM 5000 UNITS: 5000 INJECTION INTRAVENOUS; SUBCUTANEOUS at 08:27

## 2021-11-24 RX ADMIN — DORZOLAMIDE HYDROCHLORIDE AND TIMOLOL MALEATE 1 DROP: 20; 5 SOLUTION/ DROPS OPHTHALMIC at 08:33

## 2021-11-24 RX ADMIN — PANTOPRAZOLE SODIUM 40 MG: 40 TABLET, DELAYED RELEASE ORAL at 06:16

## 2021-11-24 RX ADMIN — LISINOPRIL 20 MG: 20 TABLET ORAL at 08:28

## 2021-11-24 RX ADMIN — Medication 1 CAPSULE: at 08:27

## 2021-11-24 RX ADMIN — ACETAMINOPHEN 650 MG: 325 TABLET ORAL at 09:54

## 2021-11-24 RX ADMIN — INFLUENZA A VIRUS A/VICTORIA/2570/2019 IVR-215 (H1N1) ANTIGEN (PROPIOLACTONE INACTIVATED), INFLUENZA A VIRUS A/CAMBODIA/E0826360/2020 IVR-224 (H3N2) ANTIGEN (PROPIOLACTONE INACTIVATED), INFLUENZA B VIRUS B/VICTORIA/705/2018 BVR-11 ANTIGEN (PROPIOLACTONE INACTIVATED), INFLUENZA B VIRUS B/PHUKET/3073/2013 BVR-1B ANTIGEN (PROPIOLACTONE INACTIVATED) 0.5 ML: 15; 15; 15; 15 INJECTION, SUSPENSION INTRAMUSCULAR at 10:48

## 2021-11-24 RX ADMIN — ATORVASTATIN CALCIUM 10 MG: 10 TABLET, FILM COATED ORAL at 08:27

## 2021-11-24 RX ADMIN — AMLODIPINE BESYLATE 5 MG: 5 TABLET ORAL at 08:28

## 2021-11-24 RX ADMIN — ASPIRIN 81 MG: 81 TABLET, CHEWABLE ORAL at 08:27

## 2021-11-24 RX ADMIN — LEVOTHYROXINE SODIUM 75 MCG: 0.07 TABLET ORAL at 06:17

## 2021-11-24 ASSESSMENT — PAIN SCALES - GENERAL
PAINLEVEL_OUTOF10: 2
PAINLEVEL_OUTOF10: 3

## 2021-11-24 NOTE — CARE COORDINATION
[facility]    DC order noted, all docs needed have been faxed to [facility] for home care services.     Home care to see patient within 24-48 hrs    Janey Uriarte RN, BSN CTN  [facility] [phone]

## 2021-11-24 NOTE — DISCHARGE SUMMARY
Department of [facility]  Dr. Александр Rust Discharge Summary     Patient Identification:  Karina Malcolm  : 1929  Admit date: 2021  Discharge date: 21   Attending provider: Koko Caldwell MD        Primary care provider: Yuri Perrin MD     Discharge Diagnoses:   Patient Active Problem List   Diagnosis    Urinary tract infection with hematuria    Hyponatremia    Nausea & vomiting    Left displaced femoral neck fracture ([address]    Community acquired pneumonia    Acute respiratory failure with hypoxia ([address]    High anion gap metabolic acidosis    Hypoxia    Rhinitis    Pneumatocele of lung    Sciatica of left side    Low back pain    Scoliosis of lumbosacral region due to degenerative disease of spine in adult    History of left hip hemiarthroplasty    Debility         Discharge Functional Status:    Physical therapy:  Bed Mobility: Scooting: Modified independent  Transfers: Sit to Stand: Modified independent  Stand to sit: Modified independent  Bed to Chair: Modified independent  Comment: Assisted pt in/out of bathroom, pt performed her own brittaney-care and donned/doffed clothing on her own., Ambulation 1  Surface: level tile, carpet, uneven  Device: Rollator  Assistance: Modified Independent  Quality of Gait: Narrow GARY. decreased stride length, with step through pattern; toe out on L foot and antalgic gait on L with increased fatigue  Gait Deviations: Decreased step length, Decreased step height, Slow Jacque  Distance: 210' x 2 with several turns over level surface, carpet, and dorsills; short distances in therapy gym  Comments:  Indep with rollator/break management, Stairs  # Steps : 12  Stairs Height: 6\"  Rails: Bilateral  Curbs: 6\"  Device: 4 wheeled walker, No Device (rollator for curb step)  Assistance: Supervision, Stand by assistance  Comment: Close SBA for curb step with Rollator since pt had not practiced this before  Mobility:  , PT Equipment Recommendations  Equipment Needed: No  Other: Patient reports to use rollato on first floor, Assessment: Pt tolerated treatment well this AM as  indidcated by ability to perform functional mobility and ambulate community distances SBA-Yovani. She is steady with Rollator and independent with managing breaks without cueing. Pt did not complain of pain with mobility this date. She has met 3/5 rehabilitation goals. Anticipated D/C to home with home PT tomorrow (11/24). Occupational therapy:  ,  , Assessment: pt continues to improve her overall transfers & fxl mobility skills using RW. Today she was able to ambulate in kitchen, retrieve juice from fridge, place into basket & transport over to counter and then to table; no LOB. Showing improvement to get closer to objects to reduce reaching outside GARY & fall risk. She completed toileting & transfer IND;ly; able to ambulate thru bathrm, wash hands at sink; she has improved standing tolerance x 5 minutes. Pt on target to return home on Wed w/ family support & [facility]      Inpatient Rehabilitation Course:   Khanh Kim is a 80 y.o. female admitted to inpatient rehabilitation on 11/16/2021 for s/p Debility. The patient participated in an aggressive multidisciplinary inpatient rehabilitation program involving 3 hours per day, 5 days per week of rehabilitation. 79 yo F with pmh HTN, HLD, TIA, OA s/p left VITO who initially presented 11/13/2021 with low back and left hip pain. Symptoms started ~1 week ago with no clear inciting event. She has had persistent difficulty with ambulation and progressive weakness/fatigue which prompted her to present to ED. She was found to have klebsiella/proteus UTI. CT left hip negative for acute abnormality or hardware complications. CT lumbar spine showed severe multilevel degenerative changes with no significant canal stenosis. She was seen by Ortho. She is being treated with steroid taper.  Now presents to ARU with impaired mobility and self-care below her baseline. Currently, patient reports some improvement in her low back and left hip pain. Pain is localized to the left low back/upper buttock and lateral left hip. She is motivated to start inpatient rehab program.   After admission to the Rehab Unit, she made steady progress in her therapies as listed above under each therapy section. Her upper and lower extremity coordination and strength did improve in therapy, and she was starting to improve with her endurance and functional status as she participated in her rehab therapies. Her low back pain. , balance, labs and blood pressure were monitored while on the rehabilitation unit. Patient seen this Ariella Most has made good improvement in her therapies on our rehabilitation unit, and she is ready for discharge to home today. Patient does live at home alone but has help from her children 3-4 times a week. We will plan discharge to home today with continued PT, OT, and home health aide at discharge, and also set her up with a rolling walker at discharge. Repeat labs look good and are stable. I have filled out her EMELINA and meds for her discharge today. Patient discussed with her daughter and her room today, and all questions were answered. She will follow-up with her family doctor and spine orthopedics after discharge.         Condition at Discharge: Good    Significant Diagnostics:   Lab Results   Component Value Date     11/22/2021    K 4.1 11/22/2021     11/22/2021    BUN 14 11/22/2021    CREATININE 0.6 11/22/2021    GLUCOSE 83 11/22/2021    CALCIUM 8.2 (L) 11/22/2021     Lab Results   Component Value Date    WBC 7.6 11/22/2021    RBC 3.54 (L) 11/22/2021    HGB 10.5 (L) 11/22/2021    HCT 32.4 (L) 11/22/2021    MCV 91.5 11/22/2021    MCH 29.7 11/22/2021    MCHC 32.4 11/22/2021    RDW 15.5 (H) 11/22/2021     11/22/2021    MPV 7.0 11/22/2021     Lab Results   Component Value Date    PROTIME 10.1 08/26/2016    INR 0.89 08/26/2016     Lab Results   Component Value Date    APTT 26.6 08/26/2016     Lab Results   Component Value Date    COLORU YELLOW 11/13/2021    CLARITYU TURBID (A) 11/13/2021    GLUCOSEU Negative 11/13/2021    BILIRUBINUR Negative 11/13/2021    KETUA TRACE (A) 11/13/2021    SPECGRAV 1.013 11/13/2021    BLOODU LARGE (A) 11/13/2021    PHUR 6.0 11/13/2021    PROTEINU Negative 11/13/2021    UROBILINOGEN 1.0 11/13/2021    NITRU POSITIVE (A) 11/13/2021    LEUKOCYTESUR MODERATE (A) 11/13/2021    LABMICR YES 11/13/2021    URRFLXCULT Yes 11/13/2021    URINETYPE NotGiven 11/13/2021     Lab Results   Component Value Date    WBCUA 36 (H) 11/13/2021    EPIU 7 (H) 11/13/2021    BACTERIA 4+ (A) 11/13/2021     Lab Results   Component Value Date    LABURIN 50,000 CFU/ml 11/13/2021    LABURIN >100,000 CFU/ml 11/13/2021    ORG Proteus mirabilis (A) 11/13/2021    ORG Klebsiella oxytoca (A) 11/13/2021       CT LUMBAR SPINE WO CONTRAST    Result Date: 11/13/2021  EXAMINATION: CT OF THE LUMBAR SPINE WITHOUT CONTRAST  11/13/2021 TECHNIQUE: CT of the lumbar spine was performed without the administration of intravenous contrast. Multiplanar reformatted images are provided for review. Adjustment of mA and/or kV according to patient size was utilized. Dose modulation, iterative reconstruction, and/or weight based adjustment of the mA/kV was utilized to reduce the radiation dose to as low as reasonably achievable. COMPARISON: None HISTORY: ORDERING SYSTEM PROVIDED HISTORY: PAIN TECHNOLOGIST PROVIDED HISTORY: Reason for exam:->pain, can't walk Decision Support Exception - unselect if not a suspected or confirmed emergency medical condition->Emergency Medical Condition (MA) Reason for Exam: Hip Pain (left hip pain over the past week, no known injury, had increased pain this morning when she attempted to stand arrived via squad, lives at home ) Acuity: Acute Type of Exam: Initial FINDINGS: BONES/ALIGNMENT: Lumbar scoliosis convex left.   The vertebral body heights are maintained. No osseous destructive lesion is seen. DEGENERATIVE CHANGES: Severe multilevel degenerative disc disease. No significant canal stenosis identified. SOFT TISSUES/RETROPERITONEUM: No paraspinal mass is seen. No acute lumbar spine abnormality Severe multilevel degenerative changes without significant canal stenosis     XR CHEST PORTABLE    Result Date: 11/13/2021  EXAMINATION: ONE XRAY VIEW OF THE CHEST 11/13/2021 9:35 am COMPARISON: 07/09/2020 HISTORY: ORDERING SYSTEM PROVIDED HISTORY: fatigue TECHNOLOGIST PROVIDED HISTORY: Reason for exam:->fatigue Reason for Exam: fatigue Acuity: Acute Type of Exam: Initial FINDINGS: The lungs are without acute focal process. There is no effusion or pneumothorax. The cardiomediastinal silhouette is stable. The osseous structures are stable. No acute process. CT HIP LEFT WO CONTRAST    Result Date: 11/13/2021  EXAMINATION: CT OF THE LEFT HIP WITHOUT CONTRAST 11/13/2021 9:36 am TECHNIQUE: CT of the left hip was performed without the administration of intravenous contrast.  Multiplanar reformatted images are provided for review. Dose modulation, iterative reconstruction, and/or weight based adjustment of the mA/kV was utilized to reduce the radiation dose to as low as reasonably achievable. COMPARISON: None. HISTORY ORDERING SYSTEM PROVIDED HISTORY: pain, hx of surgery TECHNOLOGIST PROVIDED HISTORY: Reason for exam:->pain, hx of surgery Decision Support Exception - unselect if not a suspected or confirmed emergency medical condition->Emergency Medical Condition (MA) Reason for Exam: Hip Pain (left hip pain over the past week, no known injury, had increased pain this morning when she attempted to stand arrived via squad, lives at home ) Acuity: Acute Type of Exam: Initial FINDINGS: Bones: Status post left hip hemiarthroplasty. Good alignment of the prosthetic components with no dislocation. Osteopenic bones.   No acute fracture demonstrated (the tip of the stem of the femoral component not included). Fracture of the remainder of the pelvis and proximal right femur. No hematoma or abnormal fluid collection identified in the region of the left hip and proximal femur. Soft Tissue: No hematoma or abnormal fluid collection demonstrated in the region of the left hip and proximal femur. Incidental note made of left renal calculi, colonic diverticula, and 3.1 cm aneurysm of the distal abdominal aorta Joint: Status post left hip arthroplasty. No dislocation or periprosthetic lucency to suggest loosening     1. No acute abnormality. No fracture or findings of prosthetic loosening. No findings to account for the patient's left hip pain 2. 3.1 cm distal abdominal aortic aneurysm. If clinically indicated, consider nonemergent CT of the abdomen to accurately measure the entire aorta       Patient Instructions: Follow-up visits:  Patient discussed with her daughter and her room today, and all questions were answered. She will follow-up with her family doctor and spine orthopedics after discharge. Discharge Medications:     Medication List      CONTINUE taking these medications    amLODIPine 5 MG tablet  Commonly known as: NORVASC  Take 1 tablet by mouth daily     aspirin 81 MG tablet     benazepril 20 MG tablet  Commonly known as: LOTENSIN     clobetasol 0.05 % ointment  Commonly known as: Temovate  Apply topically 2 times daily.      dorzolamide-timolol 22.3-6.8 MG/ML ophthalmic solution  Commonly known as: COSOPT     levothyroxine 75 MCG tablet  Commonly known as: SYNTHROID     lovastatin 40 MG tablet  Commonly known as: MEVACOR     Lumigan 0.01 % Soln ophthalmic drops  Generic drug: bimatoprost     methocarbamol 500 MG tablet  Commonly known as: ROBAXIN  Take 2 tablets by mouth 4 times daily for 10 days     omeprazole 20 MG delayed release capsule  Commonly known as: PRILOSEC        STOP taking these medications    cefdinir 300 MG capsule  Commonly known as: OMNICEF     predniSONE 10 MG tablet  Commonly known as: DELTASONE     Probiotic Acidophilus Tabs               I spent over 35 minutes on this discharge encounter between counseling, coordination of care, and medication reconciliation.         To comply with [facility] rafael CLARKII.4.1:   Discharge order placed in advance to facilitate patients discharge needs      Antony Alfonso MD, 11/24/2021, 7:59 AM

## 2021-11-24 NOTE — PROGRESS NOTES
Reviewed AVS with patient and daughter. Requesting flu shot prior to d/c. Vaccination given in right deltoid, tolerated well. Denied questions or concerns. Escorted to car with belongings and transferred in without incident.

## 2021-11-24 NOTE — PROGRESS NOTES
Pt awake and sitting up in bed watching TV. No c/o voiced. Pt anticipates discharge to home today with daughter. Call light in reach. Bed alarm on.

## 2021-11-24 NOTE — PROGRESS NOTES
Occupational Therapy  [facility]  Daily Treatment Note/ DC summary  NAME: Radha Erazo  : 1929  MRN: [id number]    Date of Service: 2021    Discharge Recommendations:  S Level 1, Home with assist PRN  OT Equipment Recommendations  Other: ordered RW for use on main level, another RW on 2nd fl (pt currently not safe enough using 4ww d/t L hip & back pain)    Assessment   Performance deficits / Impairments: Decreased functional mobility ; Decreased ADL status; Decreased high-level IADLs; Decreased balance; Decreased endurance; Decreased strength; Decreased safe awareness  Assessment: Pt has met / goals w/ OT intervention. She is IND w/ bed mobility, household transfers & fxl mobility using RW. She completed toileting & shower level bathing w/ MI using GB; shower seat for bathing. Pt was able to gather her clothes, drape over front of RW and transport into bathrm; she is IND w/ UB & LB dressing w/o using A/E. Reviewed BLT prec when experiencing back pain but she is forgetful. Pt used RW in kitchen w/ MI due to safety concerns, occasional forgetfulness of safe hand placement & positioning herself closer to objects to reduce fall risk. Pt had occasional bowel issues but was able to clean herself up & change brief IND'ly when items in reach.  Pt is being DC'ed today to home w/ [facility] services, has lifeline & encouraged to wear AATs; has good family support  Treatment Diagnosis: impaired ADLs  Prognosis: Good  Decision Making: Medium Complexity  History: see chart  Exam: impaired ADLs, t/f, fxl mob  Assistance / Modification: MI using RW, MI w/ toileting, dressing & bathing tasks, used shower chair, GB  OT Education: ADL Adaptive Strategies; IADL Safety  Patient Education: educated on use of walker basket to safely transport items in kitchen, importance of wearing lifeline AATs  Barriers to Learning: forgetful  REQUIRES OT FOLLOW UP: Yes  Activity Tolerance  Activity Tolerance: Patient Tolerated treatment well  Activity Tolerance: can be forgetful of safe hand placement, is cooperative, minimal back pain reported  Safety Devices  Safety Devices in place: Yes  Type of devices: Gait belt; Call light within reach; Nurse notified; Left in bed (daughter present)         Patient Diagnosis(es): The encounter diagnosis was Sciatica of left side. has a past medical history of Arthritis, Endometrial thickening on ultrasound, Glaucoma, Hyperlipidemia, Hypertension, Pneumonia, Post-menopausal bleeding, and TIA (transient ischemic attack). has a past surgical history that includes Thyroidectomy, partial; Varicose vein surgery; Breast surgery; and Hip Arthroplasty (Left, 08/26/2016). Restrictions  Restrictions/Precautions  Restrictions/Precautions: Fall Risk  Position Activity Restriction  Other position/activity restrictions: reg diet, assume BLT precautions d/t back pain, contact precautions- MDRO  Subjective   General  Chart Reviewed: Yes, Progress Notes, History and Physical  Patient assessed for rehabilitation services?: Yes  Additional Pertinent Hx: Per Jesus Lewis MD's H&P : \"The patient is a 80 y.o. female with hx HTN, HLD who presents to [facility] with left lower back and hip pain. Patient states that she has had gradually worsening left lower back and hip pain over the past several days. She had a hip replacement several years ago. She describes the pain as sharp, 9/10 in severity, with rest making the pain better and weight bearing making the pain worse. She is struggling to ambulate due to the pain. She lives by herself at home, so she was worried and decided to come to the hospital for further evaluation. Denies fever, chills, chest pain, shortness of breath, abdominal pain, nausea, vomiting, constipation, diarrhea, and dysuria. In the ED, labs were significant for a sodium of 135, chloride of 98, glucose of 106. U/A showed evidence of UTI.  CXR showed no acute process. CT Left Hip without contrast showed no acute abnormality, and no fracture or findings of prosthetic loosening with an incidental 3.1 cm distal AAA. CT Lumbar Spine without contrast showed no acute lumbar spine abnormality with severe multilevel degenerative changes without significant canal stenosis. \"  Response to previous treatment: Patient with no complaints from previous session  Family / Caregiver Present: No  Referring Practitioner: Dr Norrine Krabbe  Diagnosis: Low back pain, left hip pain, debility, UTI  Subjective  Subjective: met in room, pt watching TV in bed, reports 3/10 LBP, RN aware; educated pt & daughter Anne Landers on use of muscle relaxer or trying Voltaren gel for pain relief  General Comment  Comments: agreeable for shower, DC planning      Orientation  Orientation  Overall Orientation Status: Within Normal Limits  Orientation Level: Oriented X4  Objective    ADL  Feeding: Independent  Grooming: Independent (stood to perform oral care, sat to comb hair)  UE Bathing: Independent (while seated on shower chair, she is able to manage water; wash, rinse & dry UB IND'ly when items in reach)  LE Bathing: Independent (while seated on shower chair, able to cross legs & bend over to wash, rinse & dry thighs<>feet; used GB to wash to wash brittaney areas)  UE Dressing: Independent (retrieved clothes from drawer, draped over front of RW to transport to bath, she is IND w/ doff/donning bra & shirt)  LE Dressing: Independent (she is able to doff/don shoes, socks, brief & pants; used sink for support when needed in standing)  Toileting: Independent (she is able to manage clothes, wipes IND'ly)  Additional Comments: discussed use of bidet system for better brittaney hygiene, has intermittent bowel issues; discussed going to toilet @ 2 hrs to avoid accidents        Balance  Sitting Balance: Modified independent   Standing Balance: Modified independent   Standing Balance  Time: @ 3-4 minutes  Activity: during ADLs, t/f  Comment: using support of RW, sink or GB when needed; safety concerns d/t lower back pain (tried to educate on BLT prec however she is forgetful)  Functional Mobility  Functional - Mobility Device: Rolling Walker  Activity: Retrieve items; Transport items; To/from bathroom  Assist Level: Modified independent   Functional Mobility Comments: used RW in room, to/from bathrm, no LOB, improved recall to position herself closer to objects to reduce fall risk  Toilet Transfers  Toilet - Technique: Ambulating  Equipment Used: Grab bars  Toilet Transfer: Modified independent  Toilet Transfers Comments: using RW, mild limping thru L hip--hx of L THR, + chronic back pain  Shower Transfers  Shower - Transfer From: Walker  Shower - Transfer Type: To  Shower - Transfer To:  Shower seat with back  Shower - Technique: Ambulating  Shower Transfers: Modified independence  Shower Transfers Comments: improved recall to use GBs when enter/exiting shower stall  Wheelchair Bed Transfers  Wheelchair/Bed - Technique: Ambulating  Equipment Used: Bed; Wheelchair  Level of Asssistance: Modified independent   Wheelchair Transfers Comments: improved ability to get closer prior to backing up to surfaces, forgetfulnes of safe hand placement but on LOB  Bed mobility  Rolling to Left: Independent  Rolling to Right: Independent  Supine to Sit: Independent  Sit to Supine: Independent  Scooting: Independent  Comment: flat bed no rail  Transfers  Sit to stand: Modified independent  Stand to sit: Modified independent  Transfer Comments: to/from RW                    Vision  Patient Visual Report: Blurring of print when reading  Vision Comment: low vision R eye (glaucoma)  Cognition  Overall Cognitive Status: WNL  Cognition Comment: cooperative, follows commands but is a little impulsive, forgetful/impaired STM; has good sense of humor                             Car Transfers  Car - Transfer From: Walker  Car - Technique: Ambulating  Car Transfers: Modified independence  Car Transfers: (from previous session) Pt completed car t/f--completed w/ MI, safety concerns d/t forgetful of safe hand placement, no LOB & able to lift B LEs in/out of car           Plan   Plan  Times per week: 5-6  Times per day: Twice a day  Plan weeks: DC Wed to home w/ [facility] services & family support  Specific instructions for Next Treatment: kitchen tasks, medication managment  Current Treatment Recommendations: Functional Mobility Training, Balance Training, Strengthening, Endurance Training, Self-Care / ADL, Safety Education & Training, Equipment Evaluation, Education, & procurement, Home Management Training    Goals  Short term goals  Time Frame for Short term goals: by 5 days pt will complete  Short term goal 1: Grooming while standing at sink IND'ly--MET  Short term goal 2: UB dressing IND--MET  Short term goal 3: LB dressing IND (loan A/E if indicated)--MET no A/E needed  Short term goal 4: --MET  Short term goal 5: toilet tasks IND'ly--MET  Long term goals  Time Frame for Long term goals : by 5-7 days pt will complete  Long term goal 1: shower level bathing w/ MI using shower chair--MET  Long term goal 2: improve standing tolerance x 8-10 minutes during simple meal prep/IADLs--ongoing MET  Patient Goals   Patient goals : \"be able to move around a little better\"       Therapy Time   Individual Concurrent Group Co-treatment   Time In 0900         Time Out 1000         Minutes 60         Timed Code Treatment Minutes: [address], OTR/L #[id number]

## 2021-11-24 NOTE — PROGRESS NOTES
Department of Physical Medicine & Rehabilitation  Progress Note    Patient Identification:  Fernando Elmore  [id number]  : 1929  Admit date: 2021    Chief Complaint: Debility    Subjective:  Patient seen this Sherri Link has made good improvement in her therapies on our rehabilitation unit, and she is ready for discharge to home today. Patient does live at home alone but has help from her children 3-4 times a week. We will plan discharge to home today with continued PT, OT, and home health aide at discharge, and also set her up with a rolling walker at discharge. Repeat labs look good and are stable. I have filled out her EMELINA and meds for her discharge today. She will follow-up with her family doctor and spine orthopedics after discharge. ROS: No f/c, n/v, cp     Objective:  Patient Vitals for the past 24 hrs:   BP Temp Temp src Pulse Resp SpO2 Weight   21 0611 (!) 156/86 97.5 °F (36.4 °C) Oral 86 16 98 % --   21 0601 -- -- -- -- -- -- 101 lb 6.6 oz (46 kg)   21 1644 110/65 97.2 °F (36.2 °C) Axillary 87 16 96 % --   21 0800 -- -- -- -- -- 97 % --     Const: Alert. No distress, pleasant. HEENT: Normocephalic, atraumatic. Normal sclera/conjunctiva. MMM. CV: Regular rate and rhythm. Resp: No respiratory distress. Lungs CTAB. Abd: Soft, nontender, nondistended, NABS+   Ext: No edema. MSK: +Kyphoscoliosis, decreased spine and hip ROM. Neuro: Alert, oriented, appropriately interactive. Psych: Cooperative, appropriate mood and affect    Laboratory data: Available via EMR. Last 24 hour lab  No results found for this or any previous visit (from the past 24 hour(s)).     Therapy progress:  PT  Position Activity Restriction  Other position/activity restrictions: reg diet, assume BLT precautions d/t back pain, contact precautions- MDRO  Objective     Sit to Stand: Modified independent  Stand to sit: Modified independent  Bed to Chair: Modified independent  Device: Rollator  Assistance: Modified Independent  Distance: 210' x 2 with several turns over level surface, carpet, and dorsills; short distances in therapy gym  OT  PT Equipment Recommendations  Equipment Needed: No  Other: Patient reports to use rollato on first floor  Toilet - Technique: Ambulating  Equipment Used: Grab bars  Toilet Transfers Comments: using RW, mild limping thru L hip            Body mass index is 16.88 kg/m². Rehabilitation Diagnosis:   Orthopedic, 8.9, Other Orthopedic        Assessment and Plan:   Discharge to home today with home therapies.     Impairments: generalized weakness, decreased spine/hip ROM, balance, endurance     Lumbar spondylosis and degenerative disc disease  -Pain improving significantly with prednisone taper  -PT/OT     Debility   -PT/OT to address endurance, strength, compensatory strategies, equipment     Klebsiella/proteus UTI  -Received ceftriaxone on acute floor --> transition to ciprofloxacin based on sensitivities.     Hyponatremia  -Possibly low solute/hypovolemic hyponatremia  -Encourage po intake and monitor -- improving    Hypokalemia  -Replaced     HTN  -amlodipine, lisinopril     HLD  -atorvastatin     Hypothyroidism  -levothyroxine     GERD  -pantoprazole     H/o OA s/p left VITO  -CT negative for fracture or hardware malfunction     Bladder   -High risk retention   -Monitor PVRs, SC prn >300cc     Bowel   -High risk constipation   -senna+colace BID, PRN miralax, MoM, and bisacodyl supp.     Safety   -fall precautions     Pain control  -acetaminophen and methocarbamol prn     PPx  -DVT: heparin  -GI: pantoprazole       Rehab Progress: Making progress. Working on functional mobility, balance, compensatory strategies for ADLs, safety.   Anticipated Dispo: home alone, family checks in 2 days/week, looking into aide services  Services: [facility], OT, [facility], RN  DME: GONZALEZ, Shower chair  ELOS: 11/24      Electronically signed by Lance Cowan MD on 11/24/2021 at 7:56 AM

## 2022-04-29 ENCOUNTER — HOSPITAL ENCOUNTER (INPATIENT)
Age: 87
LOS: 4 days | Discharge: NURSING, REHAB OR LONG-TERM CARE | DRG: 481 | End: 2022-05-04
Attending: EMERGENCY MEDICINE | Admitting: INTERNAL MEDICINE
Payer: MEDICARE

## 2022-04-29 ENCOUNTER — APPOINTMENT (OUTPATIENT)
Dept: GENERAL RADIOLOGY | Age: 87
DRG: 481 | End: 2022-04-29
Payer: MEDICARE

## 2022-04-29 ENCOUNTER — APPOINTMENT (OUTPATIENT)
Dept: CT IMAGING | Age: 87
DRG: 481 | End: 2022-04-29
Payer: MEDICARE

## 2022-04-29 DIAGNOSIS — N39.0 URINARY TRACT INFECTION WITHOUT HEMATURIA, SITE UNSPECIFIED: ICD-10-CM

## 2022-04-29 DIAGNOSIS — M97.8XXA PERIPROSTHETIC FRACTURE OF HIP, INITIAL ENCOUNTER: ICD-10-CM

## 2022-04-29 DIAGNOSIS — Z96.649 PERIPROSTHETIC FRACTURE OF HIP, INITIAL ENCOUNTER: ICD-10-CM

## 2022-04-29 DIAGNOSIS — E87.6 HYPOKALEMIA: ICD-10-CM

## 2022-04-29 DIAGNOSIS — S42.292A OTHER CLOSED DISPLACED FRACTURE OF PROXIMAL END OF LEFT HUMERUS, INITIAL ENCOUNTER: ICD-10-CM
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
## 2022-04-29 LAB
BASOPHILS ABSOLUTE: 0 K/UL (ref 0–0.2)
BASOPHILS RELATIVE PERCENT: 0.4 %
EOSINOPHILS ABSOLUTE: 0.1 K/UL (ref 0–0.6)
EOSINOPHILS RELATIVE PERCENT: 0.8 %
HCT VFR BLD CALC: 30.8 % (ref 36–48)
HEMOGLOBIN: 9.9 G/DL (ref 12–16)
LYMPHOCYTES ABSOLUTE: 2.1 K/UL (ref 1–5.1)
LYMPHOCYTES RELATIVE PERCENT: 18.7 %
MCH RBC QN AUTO: 28.6 PG (ref 26–34)
MCHC RBC AUTO-ENTMCNC: 32.2 G/DL (ref 31–36)
MCV RBC AUTO: 88.8 FL (ref 80–100)
MONOCYTES ABSOLUTE: 1 K/UL (ref 0–1.3)
MONOCYTES RELATIVE PERCENT: 8.5 %
NEUTROPHILS ABSOLUTE: 8.2 K/UL (ref 1.7–7.7)
NEUTROPHILS RELATIVE PERCENT: 71.6 %
PDW BLD-RTO: 15.8 % (ref 12.4–15.4)
PLATELET # BLD: 204 K/UL (ref 135–450)
PMV BLD AUTO: 7.3 FL (ref 5–10.5)
RBC # BLD: 3.46 M/UL (ref 4–5.2)
WBC # BLD: 11.4 K/UL (ref 4–11)

## 2022-04-29 PROCEDURE — 71045 X-RAY EXAM CHEST 1 VIEW: CPT

## 2022-04-29 PROCEDURE — 84484 ASSAY OF TROPONIN QUANT: CPT

## 2022-04-29 PROCEDURE — 93005 ELECTROCARDIOGRAM TRACING: CPT | Performed by: PHYSICIAN ASSISTANT

## 2022-04-29 PROCEDURE — 73560 X-RAY EXAM OF KNEE 1 OR 2: CPT

## 2022-04-29 PROCEDURE — 86923 COMPATIBILITY TEST ELECTRIC: CPT

## 2022-04-29 PROCEDURE — 86850 RBC ANTIBODY SCREEN: CPT

## 2022-04-29 PROCEDURE — 81001 URINALYSIS AUTO W/SCOPE: CPT

## 2022-04-29 PROCEDURE — 85025 COMPLETE CBC W/AUTO DIFF WBC: CPT

## 2022-04-29 PROCEDURE — 86900 BLOOD TYPING SEROLOGIC ABO: CPT

## 2022-04-29 PROCEDURE — 86901 BLOOD TYPING SEROLOGIC RH(D): CPT

## 2022-04-29 PROCEDURE — 87086 URINE CULTURE/COLONY COUNT: CPT

## 2022-04-29 PROCEDURE — 73590 X-RAY EXAM OF LOWER LEG: CPT

## 2022-04-29 PROCEDURE — 87186 SC STD MICRODIL/AGAR DIL: CPT

## 2022-04-29 PROCEDURE — 85610 PROTHROMBIN TIME: CPT

## 2022-04-29 PROCEDURE — 83735 ASSAY OF MAGNESIUM: CPT

## 2022-04-29 PROCEDURE — 2580000003 HC RX 258: Performed by: PHYSICIAN ASSISTANT

## 2022-04-29 PROCEDURE — P9016 RBC LEUKOCYTES REDUCED: HCPCS

## 2022-04-29 PROCEDURE — 73502 X-RAY EXAM HIP UNI 2-3 VIEWS: CPT

## 2022-04-29 PROCEDURE — 80048 BASIC METABOLIC PNL TOTAL CA: CPT

## 2022-04-29 PROCEDURE — 87077 CULTURE AEROBIC IDENTIFY: CPT

## 2022-04-29 PROCEDURE — 70450 CT HEAD/BRAIN W/O DYE: CPT

## 2022-04-29 PROCEDURE — 99285 EMERGENCY DEPT VISIT HI MDM: CPT

## 2022-04-29 PROCEDURE — 73080 X-RAY EXAM OF ELBOW: CPT

## 2022-04-29 RX ORDER — 0.9 % SODIUM CHLORIDE 0.9 %
1000 INTRAVENOUS SOLUTION INTRAVENOUS ONCE
Status: COMPLETED | OUTPATIENT
Start: 2022-04-29 | End: 2022-04-30

## 2022-04-29 RX ORDER — ONDANSETRON 2 MG/ML
4 INJECTION INTRAMUSCULAR; INTRAVENOUS ONCE
Status: COMPLETED | OUTPATIENT
Start: 2022-04-29 | End: 2022-04-30

## 2022-04-29 RX ORDER — BACITRACIN, NEOMYCIN, POLYMYXIN B 400; 3.5; 5 [USP'U]/G; MG/G; [USP'U]/G
OINTMENT TOPICAL ONCE
Status: COMPLETED | OUTPATIENT
Start: 2022-04-30 | End: 2022-04-30

## 2022-04-29 RX ORDER — MORPHINE SULFATE 2 MG/ML
2 INJECTION, SOLUTION INTRAMUSCULAR; INTRAVENOUS ONCE
Status: COMPLETED | OUTPATIENT
Start: 2022-04-29 | End: 2022-04-30

## 2022-04-29 RX ADMIN — SODIUM CHLORIDE 1000 ML: 9 INJECTION, SOLUTION INTRAVENOUS at 23:15

## 2022-04-30 PROBLEM — M80.00XA OSTEOPOROSIS WITH CURRENT PATHOLOGICAL FRACTURE, INITIAL ENCOUNTER: Status: ACTIVE | Noted: 2022-04-30

## 2022-04-30 PROBLEM — S42.435A: Status: ACTIVE | Noted: 2022-04-30

## 2022-04-30 PROBLEM — M97.02XA PERIPROSTHETIC FRACTURE AROUND INTERNAL PROSTHETIC LEFT HIP JOINT (HCC): Status: ACTIVE | Noted: 2022-04-30

## 2022-04-30 PROBLEM — S42.432A: Status: ACTIVE | Noted: 2022-04-30

## 2022-04-30 PROBLEM — E78.5 HYPERLIPIDEMIA: Status: ACTIVE | Noted: 2022-04-30

## 2022-04-30 PROBLEM — N30.01 ACUTE CYSTITIS WITH HEMATURIA: Status: ACTIVE | Noted: 2022-04-30

## 2022-04-30 PROBLEM — S42.452A DISPLACED FRACTURE OF LATERAL CONDYLE OF LEFT HUMERUS, INITIAL ENCOUNTER FOR CLOSED FRACTURE: Status: ACTIVE | Noted: 2022-04-30

## 2022-04-30 PROBLEM — S72.001A HIP FRACTURE REQUIRING OPERATIVE REPAIR, RIGHT, CLOSED, INITIAL ENCOUNTER (HCC): Status: ACTIVE | Noted: 2022-04-30

## 2022-04-30 PROBLEM — S72.002A HIP FRACTURE REQUIRING OPERATIVE REPAIR, LEFT, CLOSED, INITIAL ENCOUNTER (HCC): Status: ACTIVE | Noted: 2022-04-30

## 2022-04-30 PROBLEM — E87.6 HYPOKALEMIA: Status: ACTIVE | Noted: 2022-04-30

## 2022-04-30 PROBLEM — I10 ESSENTIAL HYPERTENSION: Status: ACTIVE | Noted: 2022-04-30

## 2022-04-30 PROBLEM — E03.9 ACQUIRED HYPOTHYROIDISM: Status: ACTIVE | Noted: 2022-04-30

## 2022-04-30 LAB
ABO/RH: NORMAL
ANION GAP SERPL CALCULATED.3IONS-SCNC: 13 MMOL/L (ref 3–16)
ANION GAP SERPL CALCULATED.3IONS-SCNC: 9 MMOL/L (ref 3–16)
ANTIBODY SCREEN: NORMAL
BACTERIA: ABNORMAL /HPF
BASOPHILS ABSOLUTE: 0 K/UL (ref 0–0.2)
BASOPHILS RELATIVE PERCENT: 0.2 %
BILIRUBIN URINE: NEGATIVE
BLOOD, URINE: ABNORMAL
BUN BLDV-MCNC: 14 MG/DL (ref 7–20)
BUN BLDV-MCNC: 16 MG/DL (ref 7–20)
CALCIUM SERPL-MCNC: 7.5 MG/DL (ref 8.3–10.6)
CALCIUM SERPL-MCNC: 8.6 MG/DL (ref 8.3–10.6)
CHLORIDE BLD-SCNC: 108 MMOL/L (ref 99–110)
CHLORIDE BLD-SCNC: 112 MMOL/L (ref 99–110)
CLARITY: ABNORMAL
CO2: 23 MMOL/L (ref 21–32)
CO2: 23 MMOL/L (ref 21–32)
COLOR: YELLOW
COMMENT UA: ABNORMAL
CREAT SERPL-MCNC: 0.8 MG/DL (ref 0.6–1.2)
CREAT SERPL-MCNC: 0.9 MG/DL (ref 0.6–1.2)
EKG ATRIAL RATE: 74 BPM
EKG DIAGNOSIS: NORMAL
EKG P AXIS: 108 DEGREES
EKG P-R INTERVAL: 156 MS
EKG Q-T INTERVAL: 464 MS
EKG QRS DURATION: 94 MS
EKG QTC CALCULATION (BAZETT): 515 MS
EKG R AXIS: -42 DEGREES
EKG T AXIS: 6 DEGREES
EKG VENTRICULAR RATE: 74 BPM
EOSINOPHILS ABSOLUTE: 0 K/UL (ref 0–0.6)
EOSINOPHILS RELATIVE PERCENT: 0.1 %
EPITHELIAL CELLS, UA: 4 /HPF (ref 0–5)
GFR AFRICAN AMERICAN: >60
GFR AFRICAN AMERICAN: >60
GFR NON-AFRICAN AMERICAN: 58
GFR NON-AFRICAN AMERICAN: >60
GLUCOSE BLD-MCNC: 130 MG/DL (ref 70–99)
GLUCOSE BLD-MCNC: 136 MG/DL (ref 70–99)
GLUCOSE URINE: NEGATIVE MG/DL
HCT VFR BLD CALC: 26.1 % (ref 36–48)
HEMOGLOBIN: 8.5 G/DL (ref 12–16)
HYALINE CASTS: 2 /LPF (ref 0–8)
INR BLD: 1 (ref 0.88–1.12)
KETONES, URINE: NEGATIVE MG/DL
LEUKOCYTE ESTERASE, URINE: ABNORMAL
LYMPHOCYTES ABSOLUTE: 1.3 K/UL (ref 1–5.1)
LYMPHOCYTES RELATIVE PERCENT: 16.6 %
MAGNESIUM: 2 MG/DL (ref 1.8–2.4)
MCH RBC QN AUTO: 29.3 PG (ref 26–34)
MCHC RBC AUTO-ENTMCNC: 32.7 G/DL (ref 31–36)
MCV RBC AUTO: 89.8 FL (ref 80–100)
MICROSCOPIC EXAMINATION: YES
MONOCYTES ABSOLUTE: 0.8 K/UL (ref 0–1.3)
MONOCYTES RELATIVE PERCENT: 10.5 %
NEUTROPHILS ABSOLUTE: 5.6 K/UL (ref 1.7–7.7)
NEUTROPHILS RELATIVE PERCENT: 72.6 %
NITRITE, URINE: NEGATIVE
PDW BLD-RTO: 15.7 % (ref 12.4–15.4)
PH UA: 7 (ref 5–8)
PLATELET # BLD: 147 K/UL (ref 135–450)
PMV BLD AUTO: 7.2 FL (ref 5–10.5)
POTASSIUM REFLEX MAGNESIUM: 3.1 MMOL/L (ref 3.5–5.1)
POTASSIUM REFLEX MAGNESIUM: 4.2 MMOL/L (ref 3.5–5.1)
PROTEIN UA: 30 MG/DL
PROTHROMBIN TIME: 11.3 SEC (ref 9.9–12.7)
RBC # BLD: 2.91 M/UL (ref 4–5.2)
RBC UA: 21 /HPF (ref 0–4)
SODIUM BLD-SCNC: 144 MMOL/L (ref 136–145)
SODIUM BLD-SCNC: 144 MMOL/L (ref 136–145)
SPECIFIC GRAVITY UA: 1.02 (ref 1–1.03)
TROPONIN: 0.03 NG/ML
TROPONIN: 0.04 NG/ML
URINE REFLEX TO CULTURE: YES
URINE TYPE: ABNORMAL
UROBILINOGEN, URINE: 1 E.U./DL
WBC # BLD: 7.7 K/UL (ref 4–11)
WBC UA: 144 /HPF (ref 0–5)

## 2022-04-30 PROCEDURE — 96374 THER/PROPH/DIAG INJ IV PUSH: CPT

## 2022-04-30 PROCEDURE — 96375 TX/PRO/DX INJ NEW DRUG ADDON: CPT

## 2022-04-30 PROCEDURE — 6370000000 HC RX 637 (ALT 250 FOR IP): Performed by: PHYSICIAN ASSISTANT

## 2022-04-30 PROCEDURE — 2580000003 HC RX 258: Performed by: PHYSICIAN ASSISTANT

## 2022-04-30 PROCEDURE — 6360000002 HC RX W HCPCS: Performed by: PHYSICIAN ASSISTANT

## 2022-04-30 PROCEDURE — 1200000000 HC SEMI PRIVATE

## 2022-04-30 PROCEDURE — 85025 COMPLETE CBC W/AUTO DIFF WBC: CPT

## 2022-04-30 PROCEDURE — 84484 ASSAY OF TROPONIN QUANT: CPT

## 2022-04-30 PROCEDURE — 6360000002 HC RX W HCPCS: Performed by: INTERNAL MEDICINE

## 2022-04-30 PROCEDURE — 93010 ELECTROCARDIOGRAM REPORT: CPT | Performed by: INTERNAL MEDICINE

## 2022-04-30 PROCEDURE — 6360000002 HC RX W HCPCS: Performed by: ORTHOPAEDIC SURGERY

## 2022-04-30 PROCEDURE — 80048 BASIC METABOLIC PNL TOTAL CA: CPT

## 2022-04-30 PROCEDURE — 6370000000 HC RX 637 (ALT 250 FOR IP): Performed by: INTERNAL MEDICINE

## 2022-04-30 PROCEDURE — 29105 APPLICATION LONG ARM SPLINT: CPT | Performed by: ORTHOPAEDIC SURGERY

## 2022-04-30 PROCEDURE — 99223 1ST HOSP IP/OBS HIGH 75: CPT | Performed by: INTERNAL MEDICINE

## 2022-04-30 PROCEDURE — 99223 1ST HOSP IP/OBS HIGH 75: CPT | Performed by: ORTHOPAEDIC SURGERY

## 2022-04-30 PROCEDURE — 2580000003 HC RX 258: Performed by: INTERNAL MEDICINE

## 2022-04-30 PROCEDURE — 36415 COLL VENOUS BLD VENIPUNCTURE: CPT

## 2022-04-30 RX ORDER — OXYCODONE HYDROCHLORIDE 5 MG/1
5 TABLET ORAL EVERY 4 HOURS PRN
Status: DISCONTINUED | OUTPATIENT
Start: 2022-04-30 | End: 2022-05-01

## 2022-04-30 RX ORDER — PANTOPRAZOLE SODIUM 40 MG/1
40 TABLET, DELAYED RELEASE ORAL
Status: DISCONTINUED | OUTPATIENT
Start: 2022-04-30 | End: 2022-05-04 | Stop reason: HOSPADM

## 2022-04-30 RX ORDER — DORZOLAMIDE HYDROCHLORIDE AND TIMOLOL MALEATE 20; 5 MG/ML; MG/ML
1 SOLUTION/ DROPS OPHTHALMIC 2 TIMES DAILY
Status: DISCONTINUED | OUTPATIENT
Start: 2022-04-30 | End: 2022-05-04 | Stop reason: HOSPADM

## 2022-04-30 RX ORDER — ASPIRIN 81 MG/1
81 TABLET, CHEWABLE ORAL DAILY
Status: DISCONTINUED | OUTPATIENT
Start: 2022-04-30 | End: 2022-05-02

## 2022-04-30 RX ORDER — LEVOTHYROXINE SODIUM 0.07 MG/1
75 TABLET ORAL DAILY
Status: DISCONTINUED | OUTPATIENT
Start: 2022-04-30 | End: 2022-05-04 | Stop reason: HOSPADM

## 2022-04-30 RX ORDER — MORPHINE SULFATE 2 MG/ML
2 INJECTION, SOLUTION INTRAMUSCULAR; INTRAVENOUS
Status: DISCONTINUED | OUTPATIENT
Start: 2022-04-30 | End: 2022-04-30

## 2022-04-30 RX ORDER — POTASSIUM CHLORIDE 20 MEQ/1
40 TABLET, EXTENDED RELEASE ORAL ONCE
Status: COMPLETED | OUTPATIENT
Start: 2022-04-30 | End: 2022-04-30

## 2022-04-30 RX ORDER — ACETAMINOPHEN 325 MG/1
650 TABLET ORAL EVERY 4 HOURS PRN
Status: DISCONTINUED | OUTPATIENT
Start: 2022-04-30 | End: 2022-05-04 | Stop reason: HOSPADM

## 2022-04-30 RX ORDER — SODIUM CHLORIDE 9 MG/ML
INJECTION, SOLUTION INTRAVENOUS PRN
Status: DISCONTINUED | OUTPATIENT
Start: 2022-04-30 | End: 2022-05-04 | Stop reason: HOSPADM

## 2022-04-30 RX ORDER — SODIUM CHLORIDE 0.9 % (FLUSH) 0.9 %
10 SYRINGE (ML) INJECTION PRN
Status: DISCONTINUED | OUTPATIENT
Start: 2022-04-30 | End: 2022-05-04 | Stop reason: HOSPADM

## 2022-04-30 RX ORDER — POLYETHYLENE GLYCOL 3350 17 G/17G
17 POWDER, FOR SOLUTION ORAL DAILY PRN
Status: DISCONTINUED | OUTPATIENT
Start: 2022-04-30 | End: 2022-05-04 | Stop reason: HOSPADM

## 2022-04-30 RX ORDER — ENOXAPARIN SODIUM 100 MG/ML
30 INJECTION SUBCUTANEOUS DAILY
Status: DISCONTINUED | OUTPATIENT
Start: 2022-04-30 | End: 2022-05-02

## 2022-04-30 RX ORDER — SODIUM CHLORIDE 9 MG/ML
INJECTION, SOLUTION INTRAVENOUS CONTINUOUS
Status: DISCONTINUED | OUTPATIENT
Start: 2022-04-30 | End: 2022-05-03

## 2022-04-30 RX ORDER — 0.9 % SODIUM CHLORIDE 0.9 %
1000 INTRAVENOUS SOLUTION INTRAVENOUS ONCE
Status: COMPLETED | OUTPATIENT
Start: 2022-04-30 | End: 2022-04-30

## 2022-04-30 RX ORDER — LISINOPRIL 20 MG/1
20 TABLET ORAL DAILY
Status: DISCONTINUED | OUTPATIENT
Start: 2022-04-30 | End: 2022-05-04 | Stop reason: HOSPADM

## 2022-04-30 RX ORDER — ACETAMINOPHEN 650 MG/1
650 SUPPOSITORY RECTAL EVERY 4 HOURS PRN
Status: DISCONTINUED | OUTPATIENT
Start: 2022-04-30 | End: 2022-05-04 | Stop reason: HOSPADM

## 2022-04-30 RX ORDER — MORPHINE SULFATE 4 MG/ML
4 INJECTION, SOLUTION INTRAMUSCULAR; INTRAVENOUS
Status: DISCONTINUED | OUTPATIENT
Start: 2022-04-30 | End: 2022-04-30

## 2022-04-30 RX ORDER — ATORVASTATIN CALCIUM 20 MG/1
20 TABLET, FILM COATED ORAL NIGHTLY
Status: DISCONTINUED | OUTPATIENT
Start: 2022-04-30 | End: 2022-05-04 | Stop reason: HOSPADM

## 2022-04-30 RX ORDER — AMLODIPINE BESYLATE 5 MG/1
5 TABLET ORAL DAILY
Status: DISCONTINUED | OUTPATIENT
Start: 2022-04-30 | End: 2022-05-04 | Stop reason: HOSPADM

## 2022-04-30 RX ORDER — MORPHINE SULFATE 2 MG/ML
1 INJECTION, SOLUTION INTRAMUSCULAR; INTRAVENOUS EVERY 4 HOURS PRN
Status: DISCONTINUED | OUTPATIENT
Start: 2022-04-30 | End: 2022-05-04 | Stop reason: HOSPADM

## 2022-04-30 RX ORDER — ONDANSETRON 2 MG/ML
4 INJECTION INTRAMUSCULAR; INTRAVENOUS EVERY 4 HOURS PRN
Status: DISCONTINUED | OUTPATIENT
Start: 2022-04-30 | End: 2022-05-04 | Stop reason: HOSPADM

## 2022-04-30 RX ORDER — SODIUM CHLORIDE 0.9 % (FLUSH) 0.9 %
10 SYRINGE (ML) INJECTION EVERY 12 HOURS SCHEDULED
Status: DISCONTINUED | OUTPATIENT
Start: 2022-04-30 | End: 2022-05-04 | Stop reason: HOSPADM

## 2022-04-30 RX ORDER — TRAMADOL HYDROCHLORIDE 50 MG/1
50 TABLET ORAL EVERY 6 HOURS PRN
Status: DISCONTINUED | OUTPATIENT
Start: 2022-04-30 | End: 2022-04-30

## 2022-04-30 RX ORDER — LATANOPROST 50 UG/ML
1 SOLUTION/ DROPS OPHTHALMIC NIGHTLY
Status: DISCONTINUED | OUTPATIENT
Start: 2022-04-30 | End: 2022-05-04 | Stop reason: HOSPADM

## 2022-04-30 RX ADMIN — CEFTRIAXONE 1000 MG: 1 INJECTION, POWDER, FOR SOLUTION INTRAMUSCULAR; INTRAVENOUS at 01:54

## 2022-04-30 RX ADMIN — ACETAMINOPHEN 650 MG: 325 TABLET ORAL at 22:32

## 2022-04-30 RX ADMIN — PANTOPRAZOLE SODIUM 40 MG: 40 TABLET, DELAYED RELEASE ORAL at 06:48

## 2022-04-30 RX ADMIN — SODIUM CHLORIDE 1000 ML: 9 INJECTION, SOLUTION INTRAVENOUS at 01:21

## 2022-04-30 RX ADMIN — LATANOPROST 1 DROP: 50 SOLUTION OPHTHALMIC at 22:33

## 2022-04-30 RX ADMIN — OXYCODONE 5 MG: 5 TABLET ORAL at 14:01

## 2022-04-30 RX ADMIN — AMLODIPINE BESYLATE 5 MG: 5 TABLET ORAL at 08:45

## 2022-04-30 RX ADMIN — MORPHINE SULFATE 2 MG: 2 INJECTION, SOLUTION INTRAMUSCULAR; INTRAVENOUS at 00:29

## 2022-04-30 RX ADMIN — DORZOLAMIDE HYDROCHLORIDE AND TIMOLOL MALEATE 1 DROP: 20; 5 SOLUTION/ DROPS OPHTHALMIC at 08:24

## 2022-04-30 RX ADMIN — ATORVASTATIN CALCIUM 20 MG: 20 TABLET, FILM COATED ORAL at 22:32

## 2022-04-30 RX ADMIN — SODIUM CHLORIDE, PRESERVATIVE FREE 10 ML: 5 INJECTION INTRAVENOUS at 22:46

## 2022-04-30 RX ADMIN — POTASSIUM CHLORIDE 40 MEQ: 20 TABLET, EXTENDED RELEASE ORAL at 01:56

## 2022-04-30 RX ADMIN — DORZOLAMIDE HYDROCHLORIDE AND TIMOLOL MALEATE 1 DROP: 20; 5 SOLUTION/ DROPS OPHTHALMIC at 22:33

## 2022-04-30 RX ADMIN — ACETAMINOPHEN 650 MG: 325 TABLET ORAL at 08:45

## 2022-04-30 RX ADMIN — ASPIRIN 81 MG 81 MG: 81 TABLET ORAL at 08:45

## 2022-04-30 RX ADMIN — LISINOPRIL 20 MG: 20 TABLET ORAL at 08:45

## 2022-04-30 RX ADMIN — ONDANSETRON 4 MG: 2 INJECTION INTRAMUSCULAR; INTRAVENOUS at 00:29

## 2022-04-30 RX ADMIN — SODIUM CHLORIDE: 9 INJECTION, SOLUTION INTRAVENOUS at 03:01

## 2022-04-30 RX ADMIN — BACITRACIN ZINC, NEOMYCIN, POLYMYXIN B SULFAT: 5000; 3.5; 4 OINTMENT TOPICAL at 00:33

## 2022-04-30 RX ADMIN — ENOXAPARIN SODIUM 30 MG: 100 INJECTION SUBCUTANEOUS at 14:01

## 2022-04-30 RX ADMIN — OXYCODONE 5 MG: 5 TABLET ORAL at 02:56

## 2022-04-30 RX ADMIN — SODIUM CHLORIDE, PRESERVATIVE FREE 10 ML: 5 INJECTION INTRAVENOUS at 08:46

## 2022-04-30 RX ADMIN — LEVOTHYROXINE SODIUM 75 MCG: 0.07 TABLET ORAL at 06:48

## 2022-04-30 RX ADMIN — OXYCODONE 5 MG: 5 TABLET ORAL at 08:45

## 2022-04-30 RX ADMIN — TRAMADOL HYDROCHLORIDE 50 MG: 50 TABLET, COATED ORAL at 04:16

## 2022-04-30 RX ADMIN — CEFTRIAXONE 1000 MG: 1 INJECTION, POWDER, FOR SOLUTION INTRAMUSCULAR; INTRAVENOUS at 14:02

## 2022-04-30 RX ADMIN — ACETAMINOPHEN 650 MG: 325 TABLET ORAL at 14:00

## 2022-04-30 RX ADMIN — OXYCODONE 5 MG: 5 TABLET ORAL at 22:32

## 2022-04-30 ASSESSMENT — PAIN DESCRIPTION - LOCATION
LOCATION: ARM;HIP

## 2022-04-30 ASSESSMENT — ENCOUNTER SYMPTOMS
VOMITING: 0
DIARRHEA: 0
SHORTNESS OF BREATH: 0
ABDOMINAL PAIN: 0

## 2022-04-30 ASSESSMENT — PAIN DESCRIPTION - PAIN TYPE
TYPE: ACUTE PAIN

## 2022-04-30 ASSESSMENT — PAIN DESCRIPTION - FREQUENCY
FREQUENCY: CONTINUOUS

## 2022-04-30 ASSESSMENT — PAIN SCALES - GENERAL
PAINLEVEL_OUTOF10: 4
PAINLEVEL_OUTOF10: 4
PAINLEVEL_OUTOF10: 0
PAINLEVEL_OUTOF10: 7
PAINLEVEL_OUTOF10: 7
PAINLEVEL_OUTOF10: 0
PAINLEVEL_OUTOF10: 8
PAINLEVEL_OUTOF10: 7
PAINLEVEL_OUTOF10: 9

## 2022-04-30 ASSESSMENT — PAIN DESCRIPTION - ORIENTATION
ORIENTATION: LEFT

## 2022-04-30 ASSESSMENT — PAIN DESCRIPTION - DESCRIPTORS
DESCRIPTORS: ACHING

## 2022-04-30 ASSESSMENT — PAIN - FUNCTIONAL ASSESSMENT
PAIN_FUNCTIONAL_ASSESSMENT: PREVENTS OR INTERFERES WITH ALL ACTIVE AND SOME PASSIVE ACTIVITIES
PAIN_FUNCTIONAL_ASSESSMENT: PREVENTS OR INTERFERES SOME ACTIVE ACTIVITIES AND ADLS

## 2022-04-30 ASSESSMENT — PAIN DESCRIPTION - ONSET
ONSET: ON-GOING

## 2022-04-30 ASSESSMENT — LIFESTYLE VARIABLES: HOW OFTEN DO YOU HAVE A DRINK CONTAINING ALCOHOL: NEVER

## 2022-04-30 NOTE — ED TRIAGE NOTES
Patient had a fall 30 minutes PTA, has pain and le lengthening to the left side with a previous hip fx and repair.   Patient also has pain to left knee, and left elbow as well as a laceration to right lower leg

## 2022-04-30 NOTE — ED PROVIDER NOTES
I PERSONALLY SAW THE PATIENT AND PERFORMED A SUBSTANTIVE PORTION OF THE VISIT INCLUDING ALL ASPECTS OF THE MEDICAL DECISION MAKING PROCESS. [address] 55      Pt Name: Antonia Bishop  MRN: [id number]  Ramirez 2/12/1929  Date of evaluation: 4/29/2022  Provider: Vivienne Simpson MD    CHIEF COMPLAINT       Chief Complaint   Patient presents with   Melinatere Yeungss is a 80 y.o. female who presents to the emergency department with fall. Patient fell tonight. Unclear if it was mechanical or if it was presyncopal.  Positive for left elbow, left hip pain. Skin tear to the right shin. Up-to-date with tetanus. No other associated symptoms. Nursing Notes were reviewed. Including nursing noted for FM, Surgical History, Past Medical History, Social History, vitals, and allergies; agree with all. REVIEW OF SYSTEMS       Review of Systems    Except as noted above the remainder of the review of systems was reviewed and negative. PAST MEDICAL HISTORY     Past Medical History:   Diagnosis Date    Arthritis     Endometrial thickening on ultrasound     Glaucoma     Hyperlipidemia     Hypertension     Pneumonia     Post-menopausal bleeding     TIA (transient ischemic attack)        SURGICAL HISTORY       Past Surgical History:   Procedure Laterality Date    BREAST SURGERY      left lumpectomy    HIP ARTHROPLASTY Left 08/26/2016    hemiarthroplasty left hip    THYROIDECTOMY, PARTIAL      VARICOSE VEIN SURGERY         CURRENT MEDICATIONS       Current Discharge Medication List      CONTINUE these medications which have NOT CHANGED    Details   dorzolamide-timolol (COSOPT) 22.3-6.8 MG/ML ophthalmic solution Place 1 drop into both eyes 2 times daily      clobetasol (TEMOVATE) 0.05 % ointment Apply topically 2 times daily.   Qty: 1 Tube, Refills: 3    Associated Diagnoses: Vaginal itching      LUMIGAN 0.01 % SOLN ophthalmic drops Place 1 drop into both eyes every evening      benazepril (LOTENSIN) 20 MG tablet Take 20 mg by mouth daily      amLODIPine (NORVASC) 5 MG tablet Take 1 tablet by mouth daily  Qty: 30 tablet, Refills: 3      levothyroxine (SYNTHROID) 75 MCG tablet Take 75 mcg by mouth Daily      aspirin 81 MG tablet Take 81 mg by mouth daily. lovastatin (MEVACOR) 40 MG tablet Take 40 mg by mouth nightly. omeprazole (PRILOSEC) 20 MG capsule Take 20 mg by mouth daily. ALLERGIES     Sulfa antibiotics    FAMILY HISTORY      No family history on file. SOCIAL HISTORY       Social History     Socioeconomic History    Marital status:      Spouse name: Not on file    Number of children: Not on file    Years of education: Not on file    Highest education level: Not on file   Occupational History    Occupation: retired   Tobacco Use    Smoking status: Never Smoker    Smokeless tobacco: Never Used   Vaping Use    Vaping Use: Never used   Substance and Sexual Activity    Alcohol use: Yes     Comment: occ    Drug use: No    Sexual activity: Not Currently   Other Topics Concern    Not on file   Social History Narrative    Not on file     Social Determinants of Health     Financial Resource Strain:     Difficulty of Paying Living Expenses: Not on file   Food Insecurity:     Worried About [address] in the Last Year: Not on file    [address] in the Last Year: Not on file   Transportation Needs:     Lack of Transportation (Medical): Not on file    Lack of Transportation (Non-Medical):  Not on file   Physical Activity:     Days of Exercise per Week: Not on file    Minutes of Exercise per Session: Not on file   Stress:     Feeling of Stress : Not on file   Social Connections:     Frequency of Communication with Friends and Family: Not on file    Frequency of Social Gatherings with Friends and Family: Not on file    Attends Mu-ism Services: Not on file   [employer] of [employer]s or Organizations: Not on file    Attends Club or Organization Meetings: Not on file    Marital Status: Not on file   Intimate Partner Violence:     Fear of Current or Ex-Partner: Not on file    Emotionally Abused: Not on file    Physically Abused: Not on file    Sexually Abused: Not on file   Housing Stability:     Unable to Pay for Housing in the Last Year: Not on file    Number of [place] in the Last Year: Not on file    Unstable Housing in the Last Year: Not on file       PHYSICAL EXAM       ED Triage Vitals   BP Temp Temp Source Pulse Resp SpO2 Height Weight   04/29/22 2208 04/29/22 2208 04/30/22 0250 04/29/22 2208 04/29/22 2208 04/29/22 2208 04/29/22 2208 04/29/22 2208   (!) 154/71 97.8 °F (36.6 °C) Oral 84 14 97 % 5' (1.524 m) 100 lb 9.6 oz (45.6 kg)       Physical Exam  Vitals and nursing note reviewed. Constitutional:       General: She is not in acute distress. Appearance: She is well-developed. She is not ill-appearing, toxic-appearing or diaphoretic. HENT:      Head: Normocephalic and atraumatic. Right Ear: External ear normal.      Left Ear: External ear normal.   Eyes:      General:         Right eye: No discharge. Left eye: No discharge. Conjunctiva/sclera: Conjunctivae normal.      Pupils: Pupils are equal, round, and reactive to light. Cardiovascular:      Rate and Rhythm: Normal rate and regular rhythm. Heart sounds: No murmur heard. Pulmonary:      Effort: Pulmonary effort is normal. No respiratory distress. Breath sounds: Normal breath sounds. No wheezing or rales. Abdominal:      General: Bowel sounds are normal. There is no distension. Palpations: Abdomen is soft. There is no mass. Tenderness: There is no abdominal tenderness. There is no guarding or rebound. Genitourinary:     Comments: Deferred  Musculoskeletal:         General: Tenderness present. Cervical back: Normal range of motion and neck supple.    Skin: General: Skin is warm. Findings: No erythema or rash. Neurological:      Mental Status: She is alert and oriented to person, place, and time. She is not disoriented. Cranial Nerves: No cranial nerve deficit. Motor: No atrophy or abnormal muscle tone. Coordination: Coordination normal.   Psychiatric:         Behavior: Behavior normal.         Thought Content:  Thought content normal.         DIAGNOSTIC RESULTS     RADIOLOGY:   Non-plain film images such as CT, Ultrasoundand MRI are read by the radiologist. Plain radiographic images are visualized and preliminarily interpreted by the emergency physician with the below findings:    Please refer to midlevel's note for imaging    ED BEDSIDE ULTRASOUND:   Performed by ED Physician - none    LABS:  Labs Reviewed   CBC WITH AUTO DIFFERENTIAL - Abnormal; Notable for the following components:       Result Value    WBC 11.4 (*)     RBC 3.46 (*)     Hemoglobin 9.9 (*)     Hematocrit 30.8 (*)     RDW 15.8 (*)     Neutrophils Absolute 8.2 (*)     All other components within normal limits   BASIC METABOLIC PANEL W/ REFLEX TO MG FOR LOW K - Abnormal; Notable for the following components:    Potassium reflex Magnesium 3.1 (*)     Glucose 136 (*)     GFR Non- 58 (*)     All other components within normal limits   URINALYSIS WITH REFLEX TO CULTURE - Abnormal; Notable for the following components:    Clarity, UA CLOUDY (*)     Protein, UA 30 (*)     Leukocyte Esterase, Urine LARGE (*)     All other components within normal limits   TROPONIN - Abnormal; Notable for the following components:    Troponin 0.04 (*)     All other components within normal limits   MICROSCOPIC URINALYSIS - Abnormal; Notable for the following components:    Bacteria, UA 4+ (*)     WBC,  (*)     RBC, UA 21 (*)     All other components within normal limits   CULTURE, URINE   PROTIME-INR   MAGNESIUM   BASIC METABOLIC PANEL W/ REFLEX TO MG FOR LOW K   CBC WITH AUTO DIFFERENTIAL   TYPE AND SCREEN       All other labs were withinnormal range or not returned as of this dictation. EMERGENCY DEPARTMENT COURSE and DIFFERENTIAL DIAGNOSIS/MDM:     PMH, Surgical Hx, FH, Social Hx reviewed by myself (ETOH usage, Tobacco usage, Drug usage reviewed by myself, no pertinent Hx)- No Pertinent Hx     Old records were reviewed by me     MDM 27-year-old female with fall found to have urinary tract infection, periprosthetic fracture, closed displaced fracture of the humerus, hypokalemia. Antibiotics initiated. Admission for orthopedics to see in the morning. Discussed with hospitalist.  Labs-   Diagnostic findings  Medications  Consults orthopedics and hospitalist  Disposition admission    I PERSONALLY SAW THE PATIENT AND PERFORMED A SUBSTANTIVE PORTION OF THE VISIT INCLUDING ALL ASPECTS OF THE MEDICAL DECISION MAKING PROCESS. CRITICAL CARE TIME   Total Critical Caretime was 39 minutes, excluding separately reportable procedures. There was a high probability of clinically significant/life threatening deterioration in the patient's condition which required my urgent intervention. CRITICAL CARE  I personally saw the patient and independently provided 39 minutes of non-concurrent critical care out of the total shared critical care time provided. This excludes seperately billable procedures. Critical care time was provided for patient as above that required close evaluation and/or intervention with concern for potential patient decompensation. PROCEDURES:  Unlessotherwise noted below, none    FINAL IMPRESSION      1. Fall, initial encounter    2. Urinary tract infection without hematuria, site unspecified    3. Periprosthetic fracture of hip, initial encounter    4. Other closed displaced fracture of proximal end of left humerus, initial encounter    5.  Hypokalemia          DISPOSITION/PLAN   DISPOSITION Admitted 04/30/2022 01:33:09 AM    PATIENT REFERRED TO:  No follow-up provider specified.     DISCHARGE MEDICATIONS:  Current Discharge Medication List             (Please note that portions ofthis note were completed with a voice recognition program.  Efforts were made to edit the dictations but occasionally words are mis-transcribed.)    Elder Qiu MD(electronically signed)  Attending Emergency Physician        Elder Qiu MD  04/30/22 5932

## 2022-04-30 NOTE — ED PROVIDER NOTES
[facility]      Pt Name: Angela Field  MRN: [id number]  [place] 2/12/1929  Date of evaluation: 4/29/2022  Provider: ROCKY Barajas    This patient was seen and evaluated by the attending physician Dr. Charlene Mcgee. CHIEF COMPLAINT       Chief Complaint   Patient presents with    Fall       CRITICAL CARE TIME   I performed a total Critical Care time of 31 minutes, excluding separately reportable procedures. There was a high probability of clinically significant/life threatening deterioration in the patient's condition which required my urgent intervention. Not limited to multiple reexaminations, discussions with attending physician and consultants. HISTORY OF PRESENT ILLNESS  (Location/Symptom, Timing/Onset, Context/Setting, Quality, Duration, Modifying Factors, Severity.)   Angela Field is a 80 y.o. female who presents to the emergency department via EMS from home where she lives alone. She is accompanied by children at the bedside. She tells me that she was walking towards her TV when she fell. She tells me she did not lose consciousness but is unclear exactly why she fell. She has pain in her left elbow, left hip and a laceration to her right shin. Her tetanus is up-to-date. She does not take any blood thinners. She has history of hypertension, hyperlipidemia. Denies prior cardiac history. She denies chest pain shortness of breath or abdominal pain. No fevers, vomiting diarrhea or recent illness. Pain is 8 out of 10. Nursing Notes were reviewed and I agree. REVIEW OF SYSTEMS    (2-9 systems for level 4, 10 or more for level 5)     Review of Systems   Constitutional: Negative for fever. Respiratory: Negative for shortness of breath. Cardiovascular: Negative for chest pain. Gastrointestinal: Negative for abdominal pain, diarrhea and vomiting. Musculoskeletal: Positive for arthralgias and gait problem. Skin: Positive for wound. Neurological: Negative for weakness and numbness. Psychiatric/Behavioral: Negative for agitation, behavioral problems and confusion. Except as noted above the remainder of the review of systems was reviewed and negative. PAST MEDICAL HISTORY         Diagnosis Date    Arthritis     Endometrial thickening on ultrasound     Glaucoma     Hyperlipidemia     Hypertension     Pneumonia     Post-menopausal bleeding     TIA (transient ischemic attack)        SURGICAL HISTORY           Procedure Laterality Date    BREAST SURGERY      left lumpectomy    HIP ARTHROPLASTY Left 08/26/2016    hemiarthroplasty left hip    THYROIDECTOMY, PARTIAL      VARICOSE VEIN SURGERY         CURRENT MEDICATIONS       Previous Medications    AMLODIPINE (NORVASC) 5 MG TABLET    Take 1 tablet by mouth daily    ASPIRIN 81 MG TABLET    Take 81 mg by mouth daily. BENAZEPRIL (LOTENSIN) 20 MG TABLET    Take 20 mg by mouth daily    CLOBETASOL (TEMOVATE) 0.05 % OINTMENT    Apply topically 2 times daily. DORZOLAMIDE-TIMOLOL (COSOPT) 22.3-6.8 MG/ML OPHTHALMIC SOLUTION    Place 1 drop into both eyes 2 times daily    LEVOTHYROXINE (SYNTHROID) 75 MCG TABLET    Take 75 mcg by mouth Daily    LOVASTATIN (MEVACOR) 40 MG TABLET    Take 40 mg by mouth nightly. LUMIGAN 0.01 % SOLN OPHTHALMIC DROPS    Place 1 drop into both eyes every evening    OMEPRAZOLE (PRILOSEC) 20 MG CAPSULE    Take 20 mg by mouth daily. ALLERGIES     Sulfa antibiotics    FAMILY HISTORY     No family history on file. No family status information on file. SOCIAL HISTORY      reports that she has never smoked. She has never used smokeless tobacco. She reports current alcohol use. She reports that she does not use drugs.     PHYSICAL EXAM    (up to 7 for level 4, 8 or more for level 5)     ED Triage Vitals [04/29/22 2208]   BP Temp Temp src Pulse Resp SpO2 Height Weight   (!) 154/71 97.8 °F (36.6 °C) -- 84 14 97 % 5' (1.524 m) 100 lb 9.6 oz (45.6 kg)       Physical Exam  Vitals and nursing note reviewed. Constitutional:       Appearance: Normal appearance. HENT:      Head: Normocephalic and atraumatic. Mouth/Throat:      Mouth: Mucous membranes are moist.   Eyes:      Pupils: Pupils are equal, round, and reactive to light. Cardiovascular:      Rate and Rhythm: Normal rate. Pulmonary:      Effort: Pulmonary effort is normal. No respiratory distress. Abdominal:      Tenderness: There is no abdominal tenderness. There is no guarding or rebound. Musculoskeletal:      Left elbow: Swelling present. Decreased range of motion. Tenderness present. Cervical back: Normal range of motion. Left hip: Tenderness present. Decreased range of motion. Legs:    Skin:     General: Skin is warm. Neurological:      General: No focal deficit present. Mental Status: She is alert. Psychiatric:         Mood and Affect: Mood normal.         Behavior: Behavior normal.         DIAGNOSTIC RESULTS     EKG: All EKG's are interpreted by ROCKY Gray in the absence of a cardiologist.    EKG interpreted by myself - please refer to attending physician's note for complete EKG interpretation:    No evidence of acute ischemia or injury. RADIOLOGY:   Non-plain film images such as CT, Ultrasound and MRI are read by the radiologist. Plain radiographic images are visualized and preliminarily interpreted by ROCKY Gray with the below findings:    Reviewed radiologist's interpretation. Interpretation per the Radiologist below, if available at the time of this note:    XR CHEST 1 VIEW   Final Result   No radiographic evidence of acute cardiopulmonary disease. XR TIBIA FIBULA RIGHT (2 VIEWS)   Final Result   No acute fracture of the right tibia or fibula.          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 Postop changes right globe         XR KNEE LEFT (1-2 VIEWS)   Final Result   Severe osteopenia limiting the exam.      Moderate osteoarthritic changes medially in the knee which is unchanged with   no acute bony abnormality. Ill-defined sclerotic areas throughout the diametaphyseal region of the   distal femur which is more prominent and could be due to bone infarcts. Suggest MRI for further characterization, if indicated. Small suprapatellar effusion         XR ELBOW LEFT (MIN 3 VIEWS)   Final Result   Mildly displaced intra-articular fracture involving the lateral epicondyle of   the proximal humerus. XR HIP 2-3 VW W PELVIS LEFT   Final Result   1. There is a periprosthetic fracture of the left proximal femur with a   displaced lesser trochanter is well as fracture extension through the   proximal diaphysis. The femoral component appears well seated within the   acetabular cup.   2. Suspected nondisplaced fractures through the left superior pubic ramus. 3. Severe osteopenia limits detection for nondisplaced fractures.                LABS:  Labs Reviewed   CBC WITH AUTO DIFFERENTIAL - Abnormal; Notable for the following components:       Result Value    WBC 11.4 (*)     RBC 3.46 (*)     Hemoglobin 9.9 (*)     Hematocrit 30.8 (*)     RDW 15.8 (*)     Neutrophils Absolute 8.2 (*)     All other components within normal limits   BASIC METABOLIC PANEL W/ REFLEX TO MG FOR LOW K - Abnormal; Notable for the following components:    Potassium reflex Magnesium 3.1 (*)     Glucose 136 (*)     GFR Non- 58 (*)     All other components within normal limits   URINALYSIS WITH REFLEX TO CULTURE - Abnormal; Notable for the following components:    Clarity, UA CLOUDY (*)     Protein, UA 30 (*)     Leukocyte Esterase, Urine LARGE (*)     All other components within normal limits   TROPONIN - Abnormal; Notable for the following components:    Troponin 0.04 (*)     All other components within normal limits   MICROSCOPIC URINALYSIS - Abnormal; Notable for the following components:    Bacteria, UA 4+ (*)     WBC,  (*)     RBC, UA 21 (*)     All other components within normal limits   CULTURE, URINE   PROTIME-INR   MAGNESIUM   TYPE AND SCREEN       All other labs were within normal range or not returned as of this dictation. EMERGENCY DEPARTMENT COURSE and DIFFERENTIAL DIAGNOSIS/MDM:   Vitals:    Vitals:    04/29/22 2338 04/29/22 2348 04/29/22 2358 04/30/22 0008   BP: (!) 151/70 (!) 161/66 (!) 171/82 (!) 152/84   Pulse: 85 75 90 80   Resp: 25 15 16 15   Temp:       SpO2:       Weight:       Height:         Patient's blood pressure elevated 457 systolic initially. She did have an episode where her blood pressure dropped here in the emergency department. This was prior to pain medication and she was awake during the episode and denying continued complaint of chest pain or shortness of breath or abdominal pain. She is not febrile. She does have evidence of UTI and was given IV antibiotics. Contacted orthopedic Dr. Bobby Edward advised he would see the patient tomorrow. Contacted hospitalist for admission. She was placed in a sling for her left humeral fracture. Dressing to right lower extremity laceration. CONSULTS:  [id number] TO HOSPITALIST  IP CONSULT TO ORTHOPEDIC SURGERY    PROCEDURES:  Procedures      FINAL IMPRESSION      1. Fall, initial encounter    2. Urinary tract infection without hematuria, site unspecified    3. Periprosthetic fracture of hip, initial encounter    4. Other closed displaced fracture of proximal end of left humerus, initial encounter    5. Hypokalemia          DISPOSITION/PLAN   DISPOSITION Admitted 04/30/2022 01:33:09 AM      PATIENT REFERRED TO:  No follow-up provider specified.     DISCHARGE MEDICATIONS:  New Prescriptions    No medications on file       (Please note that portions of this note were completed with a voice recognition program.  Efforts were made to edit the dictations but occasionally words are mis-transcribed.)    Christian Hinton [place]  04/30/22 6211

## 2022-04-30 NOTE — PROGRESS NOTES
Pt rounded on this morning Q2h, whiteboard updated, pt given ice water and needs assessed. This RN was called by Dr. Rosio Marquez and informed that pt would likely have no surgery this weekend and a L hip procedure on Monday. LUE splint applied by Dr. Rosio Marquez, Pt diet advanced to regular and tolerating PO, Pt having poor appetite, c/o L hip and L elbow pain. Pt given PRN analgesics (see eMAR). Pt has no further needs or concerns at this time. Call light within reach, pt verbalized understanding to call prior to ambulating. Will continue to monitor and reassess.    Electronically signed by Rosemarie Greene RN on 4/30/2022 at 5:29 PM

## 2022-04-30 NOTE — CONSULTS
Referring Physician: Dr. Zay Cowan  Reason for Consultation: \"Surgical clearance. Pt fell, unclear reason. \"  Chief Complaint: I broke my hip    Subjective:   History of Present Illness:  Fransisca Watkins is a 80 y.o. patient who presented to the hospital with complaints of left hip and elbow pain after sustaining a fall at home. After the fall, she was unable to stand and pressed her life alert button. She is uncertain how/why she fell but denies loss of consciousness, lightheadedness, palpitations, shortness of breath, or chest pains. In the emergency department she was found to have a periprosthetic fracture of the left proximal femur as well as a intra-articular fracture of the lateral epicondyle of the proximal humerus. The reason for consultation was preoperative risk assessment. Prior to her fall, the patient felt that she was in her usual state of health. She denies any prior MI, CAD, CHF, or diabetes. Urinalysis is suggestive of a UTI. Urine culture is pending. She currently denies chest pain, shortness of breath, PND, orthopnea, or lower extremity edema. Her primary complaint is pain issues. Her troponin is minimally elevated with a flat trend. She denies melena or hematochezia but her hemoglobin is 8.5 and she reports being anemic in the past.    Past Medical History:   has a past medical history of Acquired hypothyroidism, Arthritis, Endometrial thickening on ultrasound, Glaucoma, Hyperlipidemia, Hypertension, Pneumonia, Post-menopausal bleeding, and TIA (transient ischemic attack). Surgical History:   has a past surgical history that includes Thyroidectomy, partial; Varicose vein surgery; Breast surgery; and Hip Arthroplasty (Left, 08/26/2016). Social History:   reports that she has never smoked. She has never used smokeless tobacco. She reports current alcohol use. She reports that she does not use drugs. Family History:  Denies premature coronary atherosclerosis.     Home Medications:  Were reviewed and are listed in nursing record and/or below  Prior to Admission medications    Medication Sig Start Date End Date Taking? Authorizing Provider   dorzolamide-timolol (COSOPT) 22.3-6.8 MG/ML ophthalmic solution Place 1 drop into both eyes 2 times daily    Historical Provider, MD   clobetasol (TEMOVATE) 0.05 % ointment Apply topically 2 times daily. 2/4/21   Supa Chavez MD   LUMIGAN 0.01 % SOLN ophthalmic drops Place 1 drop into both eyes every evening 11/4/19   Historical Provider, MD   benazepril (LOTENSIN) 20 MG tablet Take 20 mg by mouth daily 11/7/19   Historical Provider, MD   amLODIPine (NORVASC) 5 MG tablet Take 1 tablet by mouth daily 8/28/16   Roselyn Kilpatrick MD   levothyroxine (SYNTHROID) 75 MCG tablet Take 75 mcg by mouth Daily    Historical Provider, MD   aspirin 81 MG tablet Take 81 mg by mouth daily. Historical Provider, MD   lovastatin (MEVACOR) 40 MG tablet Take 40 mg by mouth nightly. Historical Provider, MD   omeprazole (PRILOSEC) 20 MG capsule Take 20 mg by mouth daily.     Historical Provider, MD        CURRENT Medications:  pantoprazole (PROTONIX) tablet 40 mg, QAM AC  latanoprost (XALATAN) 0.005 % ophthalmic solution 1 drop, Nightly  atorvastatin (LIPITOR) tablet 20 mg, Nightly  levothyroxine (SYNTHROID) tablet 75 mcg, Daily  dorzolamide-timolol (COSOPT) 22.3-6.8 MG/ML ophthalmic solution 1 drop, BID  lisinopril (PRINIVIL;ZESTRIL) tablet 20 mg, Daily  aspirin chewable tablet 81 mg, Daily  amLODIPine (NORVASC) tablet 5 mg, Daily  sodium chloride flush 0.9 % injection 10 mL, 2 times per day  sodium chloride flush 0.9 % injection 10 mL, PRN  0.9 % sodium chloride infusion, PRN  ondansetron (ZOFRAN) injection 4 mg, Q4H PRN  polyethylene glycol (GLYCOLAX) packet 17 g, Daily PRN  acetaminophen (TYLENOL) tablet 650 mg, Q4H PRN   Or  acetaminophen (TYLENOL) suppository 650 mg, Q4H PRN  0.9 % sodium chloride infusion, Continuous  oxyCODONE (ROXICODONE) immediate release tablet 5 mg, Q4H PRN  cefTRIAXone (ROCEPHIN) 1000 mg IVPB in 50 mL D5W minibag, Q24H  morphine (PF) injection 1 mg, Q4H PRN  tetanus-diphth-acell pertussis (BOOSTRIX) injection 0.5 mL, Once    Allergies:  Sulfa antibiotics     Review of Systems:   · Constitutional: no unanticipated weight loss. There's been no change in energy level, sleep pattern, or activity level. No fevers, chills. · Eyes: No visual changes or diplopia. No scleral icterus. · ENT: No Headaches, hearing loss or vertigo. No mouth sores or sore throat. · Cardiovascular: as reviewed in HPI  · Respiratory: No cough or wheezing, no sputum production. No hemoptysis. · Gastrointestinal: No abdominal pain, appetite loss, blood in stools. No change in bowel or bladder habits. · Genitourinary: No dysuria, trouble voiding, or hematuria. · Musculoskeletal:  No gait disturbance, no joint complaints. · Integumentary: No rash or pruritis. · Neurological: No headache, diplopia, change in muscle strength, numbness or tingling. · Psychiatric: No anxiety or depression. · Endocrine: No temperature intolerance. No excessive thirst, fluid intake, or urination. No tremor. · Hematologic/Lymphatic: No abnormal bruising or bleeding, blood clots or swollen lymph nodes. · Allergic/Immunologic: No nasal congestion or hives. Objective:   PHYSICAL EXAM:    Vitals:    04/30/22 0730   BP: 103/61   Pulse: 144   Resp: 16   Temp: 98.2 °F (36.8 °C)   SpO2: 94%    Weight: 100 lb 8.5 oz (45.6 kg)       General Appearance:  Alert, cooperative, no distress, elderly, frail. Head:  Normocephalic, without obvious abnormality, atraumatic. Eyes:  Pupils equal and round. No scleral icterus. Mouth: Moist mucosa, no pharyngeal erythema. Nose: Nares normal. No drainage or sinus tenderness. Neck: Supple, symmetrical, trachea midline. No adenopathy. No tenderness/mass/nodules. No carotid bruit or elevated JVD.    Lungs:   Clear to auscultation bilaterally, respirations unlabored. No wheeze, rales, or rhonchi. Chest Wall:  No tenderness or deformity. Heart:  Regular rate. S1/S2 normal. No murmur, rub, or gallop. Abdomen:   Soft, non-tender, bowel sounds active. Musculoskeletal: No muscle wasting or digital clubbing. Extremities: Extremities normal, atraumatic. No cyanosis or edema. Pulses: 2+ radial and carotid pulses, symmetric. Skin: No rashes or lesions. Pysch: Normal mood and affect. Alert and oriented x 4. Neurologic:  Moves all extremities. Follows commands. Labs     CBC:   Lab Results   Component Value Date    WBC 7.7 04/30/2022    RBC 2.91 04/30/2022    HGB 8.5 04/30/2022    HCT 26.1 04/30/2022    MCV 89.8 04/30/2022    RDW 15.7 04/30/2022     04/30/2022     CMP:  Lab Results   Component Value Date     04/30/2022    K 4.2 04/30/2022     04/30/2022    CO2 23 04/30/2022    BUN 14 04/30/2022    CREATININE 0.8 04/30/2022    GFRAA >60 04/30/2022    GFRAA >60 12/05/2010    AGRATIO 1.4 11/16/2021    LABGLOM >60 04/30/2022    GLUCOSE 130 04/30/2022    PROT 5.5 11/16/2021    CALCIUM 7.5 04/30/2022    BILITOT <0.2 11/16/2021    ALKPHOS 64 11/16/2021    AST 12 11/16/2021    ALT 7 11/16/2021     PT/INR:  No results found for: PTINR  HgBA1c:No results found for: LABA1C  Lab Results   Component Value Date    TROPONINI 0.04 (H) 04/29/2022       Cardiac Data     EKG: Personally interpreted. 4/29/2022. Sinus rhythm with occasional premature ventricular complexes. Left axis deviation. Minimal voltage criteria for LVH. ST and T wave abnormalities. Prolonged QT. Assessment and Plan   1) Pre-operative risk assessment. Patient is at least intermediate risk cardiac risk based on RCRI score of 1 (TIA) and with advanced age/limited functional status. Patient's risk should not preclude her from proceeding with surgery. Suggest continuation of statin in the brittaney-operative period.   No need for additional cardiac testing prior to surgery. 2) Elevated troponin. History is not consistent with acute coronary syndrome. May be minimally elevated related to possible UTI. No plans for stress testing or angiography with advanced age but underlying CAD seems likely. 3) Essential hypertension. Liberalized blood pressure goal seems reasonable in a nonagenarian. Would target BP <150/90. Continue home medications. 4) Falls/periprosthetic proximal femur fracture/lateral epicondyle fracture. Will defer management to primary team and orthopedic surgery. 5) Protein calorie malnutrition. Prealbumin low when previously checked. Will defer nutritional recommendations to primary team.    6) UTI. Antibiotic management per primary team.    7) Anemia. Will defer work-up and treatment to primary team.    8) Osteoporosis. Management per primary team.    Overall, the problems requiring hospitalization are high in severity. Will sign off. Call with questions. Thank you for allowing us to participate in the care of Kerri BrambilaRamiro Vizcarra.  Aundrea Graves, [address] [place] [address]  4/30/2022 8:25 AM

## 2022-04-30 NOTE — PLAN OF CARE
Problem: Discharge Planning  Goal: Discharge to home or other facility with appropriate resources  Outcome: Progressing  Note: Patient educated on discharge plan and assessed to determine that needs are being met. Questions answered for patient. Patient encouraged to ask questions and voice concerns/comments in regards to barriers for discharge and any other questions about the plan of care. Problem: Safety - Adult  Goal: Free from fall injury  Outcome: Progressing  Note: Bed in lowest position, wheels locked, bed/chair exit alarm in place, call light within reach, and non skid footwear on. Walkway free of clutter. Pt alert and oriented and able to make needs known. Pt educated to use call light when needing to get up, and pt utilizes call light to make needs known. Will continue to monitor. Problem: ABCDS Injury Assessment  Goal: Absence of physical injury  Outcome: Progressing     Problem: Skin/Tissue Integrity  Goal: Absence of new skin breakdown  Description: 1. Monitor for areas of redness and/or skin breakdown  2. Assess vascular access sites hourly  3. Every 4-6 hours minimum:  Change oxygen saturation probe site  4. Every 4-6 hours:  If on nasal continuous positive airway pressure, respiratory therapy assess nares and determine need for appliance change or resting period.   Outcome: Progressing     Problem: Pain  Goal: Verbalizes/displays adequate comfort level or baseline comfort level  Outcome: Progressing

## 2022-04-30 NOTE — H&P
Hospital Medicine  History and Physical    PCP: Belksi Quiroz MD  Patient Name: Rae Swain    Date of Service: Pt seen/examined on 4/30/22 and admitted to Inpatient with expected LOS greater than two midnights due to medical therapy    CHIEF COMPLAINT:  Pt c/o fall at home, c/o pain in her left elbow and  left hip  HISTORY OF PRESENT ILLNESS: Pt is an 80y.o. year-old female with a history of hypertension, hyperlipidemia, hypothyroidism and a h/o a left displaced femoral neck fracture with a hemiarthroplasty left hip 8-. She lives at home alone. The emergency room for evaluation of left elbow and left hip pain status post fall. It is unclear if it was mechanical or if it was presyncopal. In the emergency room she was found to have a left periprosthetic hip fracture, a displaced left lateral epicondyle fracture and a laceration to her right shin. She is unsure of what caused her to fall. She states that she did not loose consciousness, but does not recall what caused her to fall. She reports severe left elbow pain. Her left hip has minimal pain. She was also found to have a UTI. She is being admitted for further evaluation and treatment. Pt denies head trauma, and associated signs and symptoms do not include chest pain, neck pain or stiffness, diplopia or other vision changes, difficulty swallowing, numbness in the arms or legs, or focal neurological symptoms.         Past Medical History:        Diagnosis Date    Acquired hypothyroidism 4/30/2022    Arthritis     Endometrial thickening on ultrasound     Glaucoma     Hyperlipidemia     Hypertension     Pneumonia     Post-menopausal bleeding     TIA (transient ischemic attack)        Past Surgical History:        Procedure Laterality Date    BREAST SURGERY      left lumpectomy    HIP ARTHROPLASTY Left 08/26/2016    hemiarthroplasty left hip    THYROIDECTOMY, PARTIAL      VARICOSE VEIN SURGERY         Allergies:  Sulfa antibiotics    Medications Prior to Admission:    Prior to Admission medications    Medication Sig Start Date End Date Taking? Authorizing Provider   dorzolamide-timolol (COSOPT) 22.3-6.8 MG/ML ophthalmic solution Place 1 drop into both eyes 2 times daily    Historical Provider, MD   clobetasol (TEMOVATE) 0.05 % ointment Apply topically 2 times daily. 2/4/21   MD PRECIOUS Brock 0.01 % SOLN ophthalmic drops Place 1 drop into both eyes every evening 11/4/19   Historical Provider, MD   benazepril (LOTENSIN) 20 MG tablet Take 20 mg by mouth daily 11/7/19   Historical Provider, MD   amLODIPine (NORVASC) 5 MG tablet Take 1 tablet by mouth daily 8/28/16   Alejo Harry MD   levothyroxine (SYNTHROID) 75 MCG tablet Take 75 mcg by mouth Daily    Historical Provider, MD   aspirin 81 MG tablet Take 81 mg by mouth daily. Historical Provider, MD   lovastatin (MEVACOR) 40 MG tablet Take 40 mg by mouth nightly. Historical Provider, MD   omeprazole (PRILOSEC) 20 MG capsule Take 20 mg by mouth daily. Historical Provider, MD       Family History:   Family history is negative for accelerated coronary artery disease, diabetes or malignancies. Social History:   TOBACCO:   reports that she has never smoked. She has never used smokeless tobacco.  ETOH:   reports current alcohol use. OCCUPATION:      REVIEW OF SYSTEMS:  A full review of systems was performed and is negative except for that which appears in the HPI    Physical Exam:    Vitals: /71   Pulse 86   Temp 98 °F (36.7 °C) (Oral)   Resp 16   Ht 5' (1.524 m)   Wt 100 lb 8.5 oz (45.6 kg)   SpO2 94%   BMI 19.63 kg/m²   General appearance: WD/WN 80y.o. year-old female who is alert, appears stated age and is cooperative  HEENT: Head: Normocephalic, no lesions, without obvious abnormality. Eye: Normal external eye, conjunctiva, lids cornea, PEERL. Ears: Normal external ears. Non-tender.   Nose: Normal external nose, mucus membranes and septum. Pharynx: Dental Hygiene adequate. Normal buccal mucosa. Normal pharynx. Neck: no adenopathy, no carotid bruit, no JVD, supple, symmetrical, trachea midline and thyroid not enlarged, symmetric, no tenderness/mass/nodules  Lungs: clear to auscultation bilaterally and no use of accessory muscles  Heart: regular rate and rhythm, S1, S2 normal, no murmur, click, rub or gallop and normal apical impulse  Abdomen: soft, non-tender; bowel sounds normal; no masses, no organomegaly  Extremities: Left elbow TTP. Left hip TTP. Homans sign is negative, no sign of DVT. Capillary Refill: Acceptable < 3 seconds   Peripheral Pulses: +3 easily felt, not easily obliterated with pressures   Skin: Laceration to her right shin. Otherwise skin color, texture, turgor normal. No rashes or lesions on exposed skin  Neurologic: Neurovascularly intact without any focal sensory/motor deficits. Cranial nerves: II-XII intact, grossly non-focal. Gait was not tested. Mental Status: Alert and oriented, thought content appropriate, normal insight        CBC:   Recent Labs     04/29/22 2309 04/30/22  0551   WBC 11.4* 7.7   HGB 9.9* 8.5*    147     BMP:    Recent Labs     04/29/22 2309 04/30/22  0551    144   K 3.1* 4.2    112*   CO2 23 23   BUN 16 14   CREATININE 0.9 0.8   GLUCOSE 136* 130*     Troponin:   Recent Labs     04/29/22 2309   TROPONINI 0.04*     PT/INR:  No results found for: PTINR  U/A:    Lab Results   Component Value Date    LEUKOCYTESUR LARGE 04/29/2022    RBCUA 21 04/29/2022    SPECGRAV 1.016 04/29/2022    UROBILINOGEN 1.0 04/29/2022    BILIRUBINUR Negative 04/29/2022    BLOODU Moderate 04/29/2022    GLUCOSEU Negative 04/29/2022    PROTEINU 30 04/29/2022         RAD:   I have independently reviewed and interpreted the imaging studies below and based my recommendations to the patient on those findings.     XR ELBOW LEFT (MIN 3 VIEWS)    Result Date: 4/29/2022  EXAMINATION: THREE XRAY VIEWS OF THE LEFT ELBOW 4/29/2022 10:04 pm COMPARISON: None. HISTORY: ORDERING SYSTEM PROVIDED HISTORY: injury, fall TECHNOLOGIST PROVIDED HISTORY: Reason for exam:->injury, fall Reason for Exam: fall  pain FINDINGS: Intra-articular fracture of the distal humerus involving the lateral epicondyle with mild lateral subluxation of the epicondyle. No definite radial head fracture or proximal ulnar fracture is identified. No radiopaque foreign body is identified. Mildly displaced intra-articular fracture involving the lateral epicondyle of the proximal humerus. XR KNEE LEFT (1-2 VIEWS)    Result Date: 4/29/2022  EXAMINATION: 2 XRAY VIEWS OF THE LEFT KNEE 4/29/2022 10:04 pm COMPARISON: 08/26/2016 HISTORY: ORDERING SYSTEM PROVIDED HISTORY: injury TECHNOLOGIST PROVIDED HISTORY: Reason for exam:->injury Reason for Exam: fall  pain FINDINGS: There is moderate joint space narrowing in the knee. The bones are severely osteopenic. No acute fracture or dislocation is seen. There is vague sclerosis along the distal femur which is unchanged the patella is intact there is a small suprapatellar effusion. Severe osteopenia limiting the exam. Moderate osteoarthritic changes medially in the knee which is unchanged with no acute bony abnormality. Ill-defined sclerotic areas throughout the diametaphyseal region of the distal femur which is more prominent and could be due to bone infarcts. Suggest MRI for further characterization, if indicated. Small suprapatellar effusion     XR TIBIA FIBULA RIGHT (2 VIEWS)    Result Date: 4/29/2022  EXAMINATION: 2 XRAY VIEWS OF THE RIGHT TIBIA AND FIBULA 4/29/2022 10:54 pm COMPARISON: None. HISTORY: ORDERING SYSTEM PROVIDED HISTORY: Trauma, R/O fx TECHNOLOGIST PROVIDED HISTORY: Reason for exam:->Trauma, R/O fx Reason for Exam: fall, laceration mid tibia FINDINGS: The right tibia and fibula are intact. No dislocation. Significant degenerative changes noted of the knee.   No acute soft tissue abnormality identified. No acute fracture of the right tibia or fibula. CT HEAD WO CONTRAST    Result Date: 4/29/2022  EXAMINATION: CT OF THE HEAD WITHOUT CONTRAST  4/29/2022 10:37 pm TECHNIQUE: CT of the head was performed without the administration of intravenous contrast. Dose modulation, iterative reconstruction, and/or weight based adjustment of the mA/kV was utilized to reduce the radiation dose to as low as reasonably achievable. COMPARISON: 02/24/2009 HISTORY: ORDERING SYSTEM PROVIDED HISTORY: head injury TECHNOLOGIST PROVIDED HISTORY: Has a \"code stroke\" or \"stroke alert\" been called? ->No Reason for exam:->head injury Reason for Exam: head injury FINDINGS: BRAIN/VENTRICLES: The ventricles are mildly enlarged there is diffuse mild prominence of the cortical sulci which is more prominent there is mild periventricular low density bilaterally which is more prominent there is a small old lacunar infarct in the left thalamus anteriorly which is more apparent no intracranial hemorrhage or edema is seen. There is no extra-axial fluid collection or mass. There is a small old cortical infarct along the left cerebellar hemisphere superiorly which is more apparent ORBITS: There is a radiopaque linear surgical device along the right orbit laterally which is more apparent. The visualized portion of the orbits demonstrate no acute abnormality. SINUSES: The visualized paranasal sinuses and mastoid air cells demonstrate no acute abnormality. SOFT TISSUES/SKULL:  No acute abnormality of the visualized skull or soft tissues. Mild atrophy and mild chronic microischemic disease scattered in the deep white matter which is more prominent with no acute abnormality seen Small old cortical infarct along the left cerebellar hemisphere which is more apparent.  Postop changes right globe     XR CHEST 1 VIEW    Result Date: 4/29/2022  EXAMINATION: ONE XRAY VIEW OF THE CHEST 4/29/2022 10:54 pm COMPARISON: 11/13/2021 HISTORY: ORDERING SYSTEM PROVIDED HISTORY: preop TECHNOLOGIST PROVIDED HISTORY: Reason for exam:->preop Reason for Exam: pre-op FINDINGS: Mild cardiomegaly appears unchanged. Calcifications redemonstrated of the aortic arch. Cardiomediastinal silhouette otherwise within normal limits. Lungs and costophrenic sulci appear clear. No pneumothorax or subdiaphragmatic free air. No acute osseous abnormality identified. Dextroconvex thoracic curvature redemonstrated. No radiographic evidence of acute cardiopulmonary disease. XR HIP 2-3 VW W PELVIS LEFT    Result Date: 4/29/2022  EXAMINATION: ONE XRAY VIEW OF THE PELVIS AND TWO XRAY VIEWS LEFT HIP 4/29/2022 10:04 pm COMPARISON: 04/21/2021 radiographs, CT pelvis on 11/13/2021 HISTORY: ORDERING SYSTEM PROVIDED HISTORY: fall, injury TECHNOLOGIST PROVIDED HISTORY: Reason for exam:->fall, injury Reason for Exam: fall  pain FINDINGS: Severe osteopenia. There is a new displaced fracture of the lesser trochanter and involving the proximal femoral diaphysis. There is also a suspected nondisplaced fracture involving the superior ramus of the left pubic bone. Degenerative changes are seen within the lower lumbar spine. Atherosclerosis seen within the aorta. Left hip arthroplasty appears in appropriate position. 1. There is a periprosthetic fracture of the left proximal femur with a displaced lesser trochanter is well as fracture extension through the proximal diaphysis. The femoral component appears well seated within the acetabular cup. 2. Suspected nondisplaced fractures through the left superior pubic ramus. 3. Severe osteopenia limits detection for nondisplaced fractures.        Assessment:   Principal Problem:    Hip fracture requiring operative repair, left, closed, initial encounter [facility])  Active Problems:    Acute cystitis with hematuria    Acquired hypothyroidism    Essential hypertension    Hyperlipidemia    Displaced fracture (avulsion) of lateral epicondyle of left humerus, initial encounter for closed fracture    Hypokalemia    Left displaced femoral neck fracture with a hemiarthroplasty left hip 8-  Resolved Problems:    * No resolved hospital problems. *      Plan:       Hip fracture requiring operative repair, left, closed, initial encounter (Periprosthetic fracture ), Displaced fracture (avulsion) of lateral epicondyle of left humerus, initial encounter for closed fracture - due to fall at home. It is unclear if the fall was mechanical or if it was presyncopal. Will order an echocardiogram and ask Cardiology to see her for surgical clearance. Overall she seems to be in very good condition. Will keep NPO pending further evaluation by Cardiology and Orthopedic Surgery  - H/o left displaced femoral neck fracture with a hemiarthroplasty left hip 8-    Acute cystitis with hematuria - patient was started on Rocephin empirically. Urine culture and sensitivities have been ordered, and antibiotic therapy will be adjusted as necessary based upon those results. Hypokalemia - replace Potassium and recheck in the am    Acquired hypothyroidism - stable; continue Levothyroxine    Essential (primary) hypertension - continue home meds and monitor blood pressure    Hyperlipidemia - No current evidence of Rhabdomyolysis or other adverse effects. Continue statin therapy while in the hospital             Code Status: DNR-CCA  Diet: Diet NPO Exceptions are: Ice Chips, Sips of Water with Meds  DVT Prophylaxis: SCDs    (Advanced care planning has been discussed with patient and/or responsible family member and is reflected in the code status.  Further orders associated with this have been entered if appropriate)    Disposition: Anticipate that patient will remain in the hospital for 2 or more midnights depending on further evaluation and clinical course     Please note that over 50 minutes was spent in evaluating the patient, review of records and results, discussion with staff/family, etc.      Estefany Hawthorne MD

## 2022-04-30 NOTE — CONSULTS
ORTHOPAEDIC CONSULTATION NOTE    Chief Complaint   Patient presents with   Alexander Rankin       HPI  4/30/22  80 y.o. female seen in consultation at the request of Caryle Louis MD for evaluation of left hip fracture, left elbow frature:   Onset yesterday  Injury/trauma - pulling curtains, ground level fall  History of symptoms - hx of left hip hemiarthroplasty for fracture in 2016 with Dr Marquis Chaney  Pain is located left elbow and left hip diffuse  Worse with any ROM  Better with rest, pain meds  Associated with N/A  Neck pain = no  Radicular symptoms = no  Numbness and/or tingling = no  Uses walker for ambulation  Lives alone  No DM  No CAD  Does not smoke    Review of Systems  Constitutional - denies fevers, weight loss  HEENT - no head trauma or LOC  Cardiovascular - denies chest pain, palpitations, peripheral edema, blood clots  Respiratory - denies SOB, cough  Gastrointestinal - denies abdominal pain, nausea, vomiting  Genitourinary - + UTI  Musculoskeletal - per HPI  Integumentary - wound to right leg from fall, otherwise denies rash, sores  Neurologic - denies numbness, tingling, paresthesias  Hematologic - denies abnormal bleeding, blood clots  Allergic/Immunologic - denies metal allergies, recurrent infections    Allergies   Allergen Reactions    Sulfa Antibiotics Nausea Only        Current Facility-Administered Medications   Medication Dose Route Frequency Provider Last Rate Last Admin    pantoprazole (PROTONIX) tablet 40 mg  40 mg Oral QAM AC Donna Gray MD   40 mg at 04/30/22 0648    latanoprost (XALATAN) 0.005 % ophthalmic solution 1 drop  1 drop Both Eyes Nightly Donna Gray MD        atorvastatin (LIPITOR) tablet 20 mg  20 mg Oral Nightly Donna Gray MD        levothyroxine (SYNTHROID) tablet 75 mcg  75 mcg Oral Daily Donna Gray MD   75 mcg at 04/30/22 0648    dorzolamide-timolol (COSOPT) 22.3-6.8 MG/ML ophthalmic solution 1 drop  1 drop Both Eyes BID Donna Gray MD   1 drop at 04/30/22 0824    lisinopril (PRINIVIL;ZESTRIL) tablet 20 mg  20 mg Oral Daily Norma Le MD   20 mg at 04/30/22 0845    aspirin chewable tablet 81 mg  81 mg Oral Daily Norma Le MD   81 mg at 04/30/22 0845    amLODIPine (NORVASC) tablet 5 mg  5 mg Oral Daily Norma Le MD   5 mg at 04/30/22 0845    sodium chloride flush 0.9 % injection 10 mL  10 mL IntraVENous 2 times per day Norma Le MD   10 mL at 04/30/22 0846    sodium chloride flush 0.9 % injection 10 mL  10 mL IntraVENous PRN Norma Le MD        0.9 % sodium chloride infusion   IntraVENous PRN Norma Le MD        ondansetron [facility]) injection 4 mg  4 mg IntraVENous Q4H PRN Norma Le MD        polyethylene glycol [facility]) packet 17 g  17 g Oral Daily PRN Norma Le MD        acetaminophen (TYLENOL) tablet 650 mg  650 mg Oral Q4H PRN Norma Le MD   650 mg at 04/30/22 0845    Or    acetaminophen (TYLENOL) suppository 650 mg  650 mg Rectal Q4H PRN Norma Le MD        0.9 % sodium chloride infusion   IntraVENous Continuous Norma Le MD 75 mL/hr at 04/30/22 0301 New Bag at 04/30/22 0301    oxyCODONE (ROXICODONE) immediate release tablet 5 mg  5 mg Oral Q4H PRN Norma Le MD   5 mg at 04/30/22 0845    cefTRIAXone (ROCEPHIN) 1000 mg IVPB in 50 mL D5W minibag  1,000 mg IntraVENous Q24H Norma Le MD        morphine (PF) injection 1 mg  1 mg IntraVENous Q4H PRN Shonna Washburn DO        tetanus-diphth-acell pertussis (BOOSTRIX) injection 0.5 mL  0.5 mL IntraMUSCular Once Norma Le MD           Past Medical History:   Diagnosis Date    Acquired hypothyroidism 4/30/2022    Arthritis     Endometrial thickening on ultrasound     Glaucoma     Hyperlipidemia     Hypertension     Osteoporosis with current pathological fracture, initial encounter 4/30/2022    Pneumonia     Post-menopausal bleeding     TIA (transient ischemic attack)         Past Surgical History:   Procedure Laterality Date    BREAST SURGERY      left lumpectomy    HIP ARTHROPLASTY Left 08/26/2016    hemiarthroplasty left hip    THYROIDECTOMY, PARTIAL      VARICOSE VEIN SURGERY         No family history on file. Social History     Socioeconomic History    Marital status:      Spouse name: Not on file    Number of children: Not on file    Years of education: Not on file    Highest education level: Not on file   Occupational History    Occupation: retired   Tobacco Use    Smoking status: Never Smoker    Smokeless tobacco: Never Used   Vaping Use    Vaping Use: Never used   Substance and Sexual Activity    Alcohol use: Yes     Comment: occ    Drug use: No    Sexual activity: Not Currently   Other Topics Concern    Not on file   Social History Narrative    Not on file     Social Determinants of Health     Financial Resource Strain:     Difficulty of Paying Living Expenses: Not on file   Food Insecurity:     Worried About [address] [employer] in the Last Year: Not on file    [address] [employer] in the Last Year: Not on file   Transportation Needs:     Lack of Transportation (Medical): Not on file    Lack of Transportation (Non-Medical):  Not on file   Physical Activity:     Days of Exercise per Week: Not on file    Minutes of Exercise per Session: Not on file   Stress:     Feeling of Stress : Not on file   Social Connections:     Frequency of Communication with Friends and Family: Not on file    Frequency of Social Gatherings with Friends and Family: Not on file    Attends Evangelical Services: Not on file    Active Member of Clubs or Organizations: Not on file    Attends Club or Organization Meetings: Not on file    Marital Status: Not on file   Intimate Partner Violence:     Fear of Current or Ex-Partner: Not on file    Emotionally Abused: Not on file    Physically Abused: Not on file    Sexually Abused: Not on file   Housing Stability:     Unable to Pay for Housing in the Last Year: Not on file    Number of Places Lived in the Last Year: Not on file    Unstable Housing in the Last Year: Not on file        Vitals:    04/30/22 0602 04/30/22 0645 04/30/22 0730 04/30/22 1215   BP:  103/71 103/61 (!) 92/58   Pulse:   144 99   Resp:   16 18   Temp:   98.2 °F (36.8 °C) 98.6 °F (37 °C)   TempSrc:   Oral Oral   SpO2:   94% 93%   Weight: 100 lb 8.5 oz (45.6 kg)      Height:           Physical Exam  Constitutional - well-groomed, well-nourished, Body mass index is 19.63 kg/m².   Psychiatric - pleasant, normal mood & affect  Family bedside  Cardiovascular - RRR, negative peripheral edema, dorsalis pedis pulse 2+  Respiratory - respirations unlabored, on room air; mask on  Gastrointestinal - abdomen soft NDNT  Skin - no rashes, wounds, or lesions seen on exposed skin  Neck - no radicular pain with Spurling's test.  moderately decreased cervical ROM, no TTP of spinous processes, no TTP of paraspinal musculature  Neurological - LUE SILT M/U/R/A/LABC nerve distributions; AIN/PIN/IO intact    LLE SILT SP/DP/T/sural/saphenous nerve distributions; EHL/FHL/TA/GS intact  RUE and RLE - no deformity/swelling/ecchymosis   No TTP    AROM major joints intact  LUE - sling intact   TTP lateral elbow   No TTP proximal humerus, wrist, hand  LLE - shortened and externally rotated   TTP hip   + log roll   No TTP distal thigh, knee, leg, ankle, foot      Imaging:  Images were personally reviewed by myself  Narrative   EXAMINATION:   ONE XRAY VIEW OF THE PELVIS AND TWO XRAY VIEWS LEFT HIP       4/29/2022 10:04 pm       COMPARISON:   04/21/2021 radiographs, CT pelvis on 11/13/2021       HISTORY:   ORDERING SYSTEM PROVIDED HISTORY: fall, injury   TECHNOLOGIST PROVIDED HISTORY:   Reason for exam:->fall, injury   Reason for Exam: fall  pain       FINDINGS:   Severe osteopenia. Pat Cheers is a new displaced fracture of the lesser   trochanter and involving the proximal femoral diaphysis. Pat Cheers is also a suspected nondisplaced fracture involving the superior ramus of the left   pubic bone.  Degenerative changes are seen within the lower lumbar spine. Atherosclerosis seen within the aorta.  Left hip arthroplasty appears in   appropriate position.           Impression   1. There is a periprosthetic fracture of the left proximal femur with a   displaced lesser trochanter is well as fracture extension through the   proximal diaphysis.  The femoral component appears well seated within the   acetabular cup.   2. Suspected nondisplaced fractures through the left superior pubic ramus. 3. Severe osteopenia limits detection for nondisplaced fractures. Narrative   EXAMINATION:   THREE XRAY VIEWS OF THE LEFT ELBOW       4/29/2022 10:04 pm       COMPARISON:   None.       HISTORY:   ORDERING SYSTEM PROVIDED HISTORY: injury, fall   TECHNOLOGIST PROVIDED HISTORY:   Reason for exam:->injury, fall   Reason for Exam: fall  pain       FINDINGS:   Intra-articular fracture of the distal humerus involving the lateral   epicondyle with mild lateral subluxation of the epicondyle.  No definite   radial head fracture or proximal ulnar fracture is identified.  No radiopaque   foreign body is identified.           Impression   Mildly displaced intra-articular fracture involving the lateral epicondyle of   the proximal humerus.      Narrative   EXAMINATION:   2 XRAY VIEWS OF THE LEFT KNEE       4/29/2022 10:04 pm       COMPARISON:   08/26/2016       HISTORY:   ORDERING SYSTEM PROVIDED HISTORY: injury   TECHNOLOGIST PROVIDED HISTORY:   Reason for exam:->injury   Reason for Exam: fall  pain       FINDINGS:   There is moderate joint space narrowing in the knee.  The bones are severely   osteopenic.  No acute fracture or dislocation is seen. Thomas Lava is vague   sclerosis along the distal femur which is unchanged the patella is intact   there is a small suprapatellar effusion.           Impression   Severe osteopenia limiting the exam.     Moderate osteoarthritic changes medially in the knee which is unchanged with   no acute bony abnormality.       Ill-defined sclerotic areas throughout the diametaphyseal region of the   distal femur which is more prominent and could be due to bone infarcts.    Suggest MRI for further characterization, if indicated.       Small suprapatellar effusion           Narrative   EXAMINATION:   2 XRAY VIEWS OF THE RIGHT TIBIA AND FIBULA       4/29/2022 10:54 pm       COMPARISON:   None.       HISTORY:   ORDERING SYSTEM PROVIDED HISTORY: Trauma, R/O fx   TECHNOLOGIST PROVIDED HISTORY:   Reason for exam:->Trauma, R/O fx   Reason for Exam: fall, laceration mid tibia       FINDINGS:   The right tibia and fibula are intact.  No dislocation.  Significant   degenerative changes noted of the knee.  No acute soft tissue abnormality   identified.           Impression   No acute fracture of the right tibia or fibula.           Labs:  Recent Labs     04/30/22  0551 04/29/22  2309   WBC 7.7 11.4*   HGB 8.5* 9.9*   HCT 26.1* 30.8*   MCV 89.8 88.8    204     Lab Results   Component Value Date     04/30/2022    K 4.2 04/30/2022     (H) 04/30/2022    CO2 23 04/30/2022    BUN 14 04/30/2022    CREATININE 0.8 04/30/2022    GLUCOSE 130 (H) 04/30/2022    CALCIUM 7.5 (L) 04/30/2022    PROT 5.5 (L) 11/16/2021    LABALBU 3.2 (L) 11/16/2021    BILITOT <0.2 11/16/2021    ALKPHOS 64 11/16/2021    AST 12 (L) 11/16/2021    ALT 7 (L) 11/16/2021    LABGLOM >60 04/30/2022    GFRAA >60 04/30/2022    AGRATIO 1.4 11/16/2021    GLOB 3.1 08/10/2017     Lab Results   Component Value Date    INR 1.00 04/29/2022    INR 0.89 08/26/2016     Lab Results   Component Value Date    APTT 26.6 08/26/2016     No results found for: LABA1C  No results found for: VITD25       Assessment & Plan:  80 y.o. female who presents with   Principal Problem:    Periprosthetic fracture around internal prosthetic left hip joint (HCC)  Active Problems:    Acute cystitis with hematuria    Acquired hypothyroidism    Essential hypertension    Hyperlipidemia    Displaced fracture (avulsion) of lateral epicondyle of left humerus, initial encounter for closed fracture    Hypokalemia    Minimally displaced fracture of lateral condyle of left humerus, initial encounter for closed fracture    Osteoporosis with current pathological fracture, initial encounter    Pre-operative examination    Fall    Elevated troponin    Mild protein-calorie malnutrition (HCC)    Left displaced femoral neck fracture with a hemiarthroplasty left hip 8-  Resolved Problems:    * No resolved hospital problems. *      I have reviewed the injuries/fractures and radiographs with the patient. I have discussed treatment options, both nonoperative and surgical.  I recommend closed treatment of the left elbow lateral condyle fracture as the alignment is acceptable currently   She only had a sling on from the ED   Applied well padded posterior splint today with Kasandra Choe RN    I have recommended open treatment of the left periprosthetic femur fracture with internal fixation versus revision arthroplasty   Possibility the wedged stem is loose   Will need instrumentation/implants available for both options, coordinating with staff and reps   Surgery likely Monday    Risks and benefits of surgery were discussed at length with the patient and her family and an opportunity for questions was afforded. Possible complications of this fracture and surgery include, but are not limited to, non-union, malunion, dislocation, leg-length discrepancy, loss of reduction, bleeding, infection, persistent pain, weakness, blood clots, hardware failure, periprosthetic fracture, painful hardware, neurovascular injury, numbness around the incision, and wound healing problems.   They demonstrated a good understanding of the procedure, anticipated outcomes, possible complications, the post-operative restrictions and therapy required (including possible stay at a rehabilitation facility/skilled nursing facility), and at this time voiced desire to proceed. The patient is at risk for osteoporosis and fragility fractures. Therefore, I have recommended treatment with bisphosphonates, and DEXA screening if not recently performed to establish baseline values. Calcium and vitamin D supplementation may be indicated as well. Will defer to PCP. Total time spent on initial encounter approximately 1.5 hours. This included history, exam, discussion with patient and her family, discussion with anesthesia and coordinating OR time and equipment/implants, chart completion/documentation, and splint application.     Sol Navarro MD

## 2022-04-30 NOTE — PROGRESS NOTES
Pt arrived to unit at 0230. Pt is alert and oriented X4. Pt oriented to unit and to room. Pt oriented to call light and to phone. White board updated. Pt denies any further needs at this time. Family at bedside. Will continue to monitor and assess.      Electronically signed by Pierre Rivera RN on 4/30/2022 at 2:34 AM

## 2022-05-01 PROBLEM — Z96.649 PERI-PROSTHETIC FRACTURE AROUND PROSTHETIC HIP: Status: ACTIVE | Noted: 2022-04-30

## 2022-05-01 PROBLEM — M97.8XXA PERI-PROSTHETIC FRACTURE AROUND PROSTHETIC HIP: Status: ACTIVE | Noted: 2022-04-30

## 2022-05-01 LAB
ANION GAP SERPL CALCULATED.3IONS-SCNC: 12 MMOL/L (ref 3–16)
APTT: 25.4 SEC (ref 26.2–38.6)
BASOPHILS ABSOLUTE: 0 K/UL (ref 0–0.2)
BASOPHILS RELATIVE PERCENT: 0.4 %
BUN BLDV-MCNC: 21 MG/DL (ref 7–20)
CALCIUM SERPL-MCNC: 8.2 MG/DL (ref 8.3–10.6)
CHLORIDE BLD-SCNC: 107 MMOL/L (ref 99–110)
CO2: 20 MMOL/L (ref 21–32)
CREAT SERPL-MCNC: 1 MG/DL (ref 0.6–1.2)
EOSINOPHILS ABSOLUTE: 0.1 K/UL (ref 0–0.6)
EOSINOPHILS RELATIVE PERCENT: 1.2 %
GFR AFRICAN AMERICAN: >60
GFR NON-AFRICAN AMERICAN: 52
GLUCOSE BLD-MCNC: 99 MG/DL (ref 70–99)
HCT VFR BLD CALC: 26 % (ref 36–48)
HEMOGLOBIN: 8.2 G/DL (ref 12–16)
LYMPHOCYTES ABSOLUTE: 1.5 K/UL (ref 1–5.1)
LYMPHOCYTES RELATIVE PERCENT: 19.4 %
MCH RBC QN AUTO: 29.4 PG (ref 26–34)
MCHC RBC AUTO-ENTMCNC: 31.4 G/DL (ref 31–36)
MCV RBC AUTO: 93.5 FL (ref 80–100)
MONOCYTES ABSOLUTE: 1 K/UL (ref 0–1.3)
MONOCYTES RELATIVE PERCENT: 12.4 %
NEUTROPHILS ABSOLUTE: 5.1 K/UL (ref 1.7–7.7)
NEUTROPHILS RELATIVE PERCENT: 66.6 %
PDW BLD-RTO: 17.4 % (ref 12.4–15.4)
PLATELET # BLD: 140 K/UL (ref 135–450)
PMV BLD AUTO: 7.6 FL (ref 5–10.5)
POTASSIUM REFLEX MAGNESIUM: 4.1 MMOL/L (ref 3.5–5.1)
RBC # BLD: 2.78 M/UL (ref 4–5.2)
SODIUM BLD-SCNC: 139 MMOL/L (ref 136–145)
WBC # BLD: 7.7 K/UL (ref 4–11)

## 2022-05-01 PROCEDURE — 1200000000 HC SEMI PRIVATE

## 2022-05-01 PROCEDURE — 85730 THROMBOPLASTIN TIME PARTIAL: CPT

## 2022-05-01 PROCEDURE — 2580000003 HC RX 258: Performed by: INTERNAL MEDICINE

## 2022-05-01 PROCEDURE — 80048 BASIC METABOLIC PNL TOTAL CA: CPT

## 2022-05-01 PROCEDURE — 85025 COMPLETE CBC W/AUTO DIFF WBC: CPT

## 2022-05-01 PROCEDURE — 99231 SBSQ HOSP IP/OBS SF/LOW 25: CPT | Performed by: ORTHOPAEDIC SURGERY

## 2022-05-01 PROCEDURE — 6370000000 HC RX 637 (ALT 250 FOR IP): Performed by: INTERNAL MEDICINE

## 2022-05-01 PROCEDURE — 6360000002 HC RX W HCPCS: Performed by: INTERNAL MEDICINE

## 2022-05-01 PROCEDURE — 6370000000 HC RX 637 (ALT 250 FOR IP): Performed by: ORTHOPAEDIC SURGERY

## 2022-05-01 PROCEDURE — 6360000002 HC RX W HCPCS: Performed by: ORTHOPAEDIC SURGERY

## 2022-05-01 PROCEDURE — 36415 COLL VENOUS BLD VENIPUNCTURE: CPT

## 2022-05-01 RX ORDER — OXYCODONE HYDROCHLORIDE 5 MG/1
5 TABLET ORAL EVERY 4 HOURS PRN
Status: DISCONTINUED | OUTPATIENT
Start: 2022-05-01 | End: 2022-05-02

## 2022-05-01 RX ORDER — OXYCODONE HYDROCHLORIDE 10 MG/1
10 TABLET ORAL EVERY 4 HOURS PRN
Status: DISCONTINUED | OUTPATIENT
Start: 2022-05-01 | End: 2022-05-02

## 2022-05-01 RX ORDER — SODIUM CHLORIDE 9 MG/ML
INJECTION, SOLUTION INTRAVENOUS PRN
Status: DISCONTINUED | OUTPATIENT
Start: 2022-05-01 | End: 2022-05-04 | Stop reason: HOSPADM

## 2022-05-01 RX ADMIN — DORZOLAMIDE HYDROCHLORIDE AND TIMOLOL MALEATE 1 DROP: 20; 5 SOLUTION/ DROPS OPHTHALMIC at 10:18

## 2022-05-01 RX ADMIN — OXYCODONE HYDROCHLORIDE 10 MG: 10 TABLET ORAL at 21:37

## 2022-05-01 RX ADMIN — AMLODIPINE BESYLATE 5 MG: 5 TABLET ORAL at 09:04

## 2022-05-01 RX ADMIN — CEFTRIAXONE 1000 MG: 1 INJECTION, POWDER, FOR SOLUTION INTRAMUSCULAR; INTRAVENOUS at 13:55

## 2022-05-01 RX ADMIN — LEVOTHYROXINE SODIUM 75 MCG: 0.07 TABLET ORAL at 06:13

## 2022-05-01 RX ADMIN — OXYCODONE 5 MG: 5 TABLET ORAL at 06:13

## 2022-05-01 RX ADMIN — ACETAMINOPHEN 650 MG: 325 TABLET ORAL at 14:07

## 2022-05-01 RX ADMIN — ACETAMINOPHEN 650 MG: 325 TABLET ORAL at 06:13

## 2022-05-01 RX ADMIN — ASPIRIN 81 MG 81 MG: 81 TABLET ORAL at 09:04

## 2022-05-01 RX ADMIN — SODIUM CHLORIDE, PRESERVATIVE FREE 10 ML: 5 INJECTION INTRAVENOUS at 09:06

## 2022-05-01 RX ADMIN — LATANOPROST 1 DROP: 50 SOLUTION OPHTHALMIC at 21:36

## 2022-05-01 RX ADMIN — ACETAMINOPHEN 650 MG: 325 TABLET ORAL at 21:37

## 2022-05-01 RX ADMIN — OXYCODONE HYDROCHLORIDE 10 MG: 10 TABLET ORAL at 14:07

## 2022-05-01 RX ADMIN — ATORVASTATIN CALCIUM 20 MG: 20 TABLET, FILM COATED ORAL at 21:37

## 2022-05-01 RX ADMIN — LISINOPRIL 20 MG: 20 TABLET ORAL at 09:04

## 2022-05-01 RX ADMIN — DORZOLAMIDE HYDROCHLORIDE AND TIMOLOL MALEATE 1 DROP: 20; 5 SOLUTION/ DROPS OPHTHALMIC at 21:36

## 2022-05-01 RX ADMIN — SODIUM CHLORIDE, PRESERVATIVE FREE 10 ML: 5 INJECTION INTRAVENOUS at 22:32

## 2022-05-01 RX ADMIN — PANTOPRAZOLE SODIUM 40 MG: 40 TABLET, DELAYED RELEASE ORAL at 06:13

## 2022-05-01 RX ADMIN — ENOXAPARIN SODIUM 30 MG: 100 INJECTION SUBCUTANEOUS at 09:04

## 2022-05-01 ASSESSMENT — PAIN DESCRIPTION - LOCATION
LOCATION: ARM;HIP

## 2022-05-01 ASSESSMENT — PAIN - FUNCTIONAL ASSESSMENT
PAIN_FUNCTIONAL_ASSESSMENT: ACTIVITIES ARE NOT PREVENTED
PAIN_FUNCTIONAL_ASSESSMENT: ACTIVITIES ARE NOT PREVENTED
PAIN_FUNCTIONAL_ASSESSMENT: PREVENTS OR INTERFERES SOME ACTIVE ACTIVITIES AND ADLS
PAIN_FUNCTIONAL_ASSESSMENT: PREVENTS OR INTERFERES SOME ACTIVE ACTIVITIES AND ADLS
PAIN_FUNCTIONAL_ASSESSMENT: ACTIVITIES ARE NOT PREVENTED

## 2022-05-01 ASSESSMENT — PAIN DESCRIPTION - FREQUENCY
FREQUENCY: CONTINUOUS

## 2022-05-01 ASSESSMENT — PAIN SCALES - GENERAL
PAINLEVEL_OUTOF10: 5
PAINLEVEL_OUTOF10: 7
PAINLEVEL_OUTOF10: 0
PAINLEVEL_OUTOF10: 7
PAINLEVEL_OUTOF10: 6
PAINLEVEL_OUTOF10: 7

## 2022-05-01 ASSESSMENT — PAIN DESCRIPTION - DESCRIPTORS
DESCRIPTORS: ACHING

## 2022-05-01 ASSESSMENT — PAIN DESCRIPTION - PAIN TYPE
TYPE: ACUTE PAIN

## 2022-05-01 ASSESSMENT — PAIN DESCRIPTION - ORIENTATION
ORIENTATION: LEFT

## 2022-05-01 ASSESSMENT — PAIN DESCRIPTION - ONSET
ONSET: ON-GOING

## 2022-05-01 NOTE — PLAN OF CARE
Problem: Discharge Planning  Goal: Discharge to home or other facility with appropriate resources  5/1/2022 0815 by Haroldo Sandhu RN  Outcome: Progressing   Pt planning to d/c to SNF after surgery. SW following for d/c planning. Surgery scheduled for Monday morning. Pt and family updated. Will continue to monitor. Electronically signed by Haroldo Sandhu RN on 5/1/2022 at 8:17 AM    Problem: Safety - Adult  Goal: Free from fall injury  5/1/2022 0815 by Harlodo Sadnhu RN  Outcome: Progressing   Fall risk assessment completed. Fall precautions in place. Call light within reach. Pt educated on calling for assistance before getting up. Walkway free of clutter. Will continue to monitor. Electronically signed by Haroldo Sandhu RN on 5/1/2022 at 8:17 AM    Problem: ABCDS Injury Assessment  Goal: Absence of physical injury  5/1/2022 0815 by Haroldo Sandhu RN  Outcome: Progressing   Pt is free of injury. No injury noted. Fall precautions in place. Call light within reach. Will monitor. Electronically signed by Haroldo Sandhu RN on 5/1/2022 at 8:17 AM    Problem: Skin/Tissue Integrity  Goal: Absence of new skin breakdown  Description: 1. Monitor for areas of redness and/or skin breakdown  2. Assess vascular access sites hourly  3. Every 4-6 hours minimum:  Change oxygen saturation probe site  4. Every 4-6 hours:  If on nasal continuous positive airway pressure, respiratory therapy assess nares and determine need for appliance change or resting period. 5/1/2022 0815 by Haroldo Sandhu RN  Outcome: Progressing   Skin assessment completed. No skin breakdown noted. Pt reminded to turn and reposition frequently. Will continue to monitor and reassess. Electronically signed by Haroldo Sandhu RN on 5/1/2022 at 8:17 AM    Problem: Pain  Goal: Verbalizes/displays adequate comfort level or baseline comfort level  5/1/2022 0815 by Haroldo Sandhu RN  Outcome: Progressing  Pt assessed for pain.  Pt in pain and assessed with 0-10 pain rating scale. Pt given prescribed analgesic for pain. (See eMar) Pt satisfied with pain relief thus far. Will reassess and continue to monitor.    Electronically signed by Jacquie Burgess RN on 5/1/2022 at 8:17 AM

## 2022-05-01 NOTE — PROGRESS NOTES
Physician Progress Note      PATIENT:               Mee Warren  CSN #:                  [id number]  :                       1929  ADMIT DATE:       2022 9:58 PM  [address] [place] DATE:  RESPONDING  PROVIDER #:        DEEP GLEASON DO          QUERY TEXT:    Pt admitted with Hip fracture  left, closed,  (Periprosthetic fracture ), . Pt   noted to have osteopenia. If possible, please document in progress notes and   discharge summary if you are evaluating and/or treating any of the following: The medical record reflects the following:  Risk Factors: 80year old female osteopenia  Clinical Indicators: X ray \"1. There is a periprosthetic fracture of the left   proximal femur with a displaced lesser trochanter is well as fracture   extension through the proximal diaphysis. ? The femoral component appears well   seated within the acetabular cup.  2. Suspected nondisplaced fractures through the left superior pubic ramus. 3. Severe osteopenia limits detection for nondisplaced fractures. \"  Treatment: Orthopedic consult  Options provided:  -- Pathological Hip fracture  left, closed,  (Periprosthetic fracture ) due to   osteopenia  -- Pathological Hip fracture  left, closed,  (Periprosthetic fracture ) due to   osteopenia following fall which would not usually break a normal, healthy   bone  -- Other - I will add my own diagnosis  -- Disagree - Not applicable / Not valid  -- Disagree - Clinically unable to determine / Unknown  -- Refer to Clinical Documentation Reviewer    PROVIDER RESPONSE TEXT:    This patient has a pathological Hip fracture left, closed, (Periprosthetic   fracture due to osteopenia.     Query created by: Yeni Yu on 2022 9:53 AM      Electronically signed by:  DEEP GLEASON DO 2022 7:01 AM

## 2022-05-01 NOTE — PROGRESS NOTES
Hospitalist Progress Note    CC: Periprosthetic fracture around internal prosthetic left hip joint ([address]      Admit date: 4/29/2022  Days in hospital:  1    Subjective/interval history: Pt S/E. hgb down to 6.8 today. She has been on ivf since admission. She just got out of surgery today. ROS:   Pertinent items are noted in HPI. Objective:    /73   Pulse 81   Temp 97.9 °F (36.6 °C) (Oral)   Resp 17   Ht 5' (1.524 m)   Wt 105 lb 2.6 oz (47.7 kg)   SpO2 94%   BMI 20.54 kg/m²     Gen: asleep  HEENT: NC/AT, moist mucous membranes, no oropharyngeal erythema or exudate  Neck: supple, trachea midline  Heart: Normal s1/s2, RRR, no murmurs, gallops, or rubs. Lungs: clear bilaterally, no wheezing, no rales, no rhonchi, no use of accessory muscles  Abd: bowel sounds +, soft, nondistended, no masses  Extrem: No clubbing, cyanosis, no edema  Skin: no rashes or lesions  Psych: Asleep, able to be awakened  Neuro: grossly intact, moves all four extremities spontaneously.   Cap refill: +2 sec    Medications:  Scheduled Meds:   pantoprazole  40 mg Oral QAM AC    latanoprost  1 drop Both Eyes Nightly    atorvastatin  20 mg Oral Nightly    levothyroxine  75 mcg Oral Daily    dorzolamide-timolol  1 drop Both Eyes BID    lisinopril  20 mg Oral Daily    aspirin  81 mg Oral Daily    amLODIPine  5 mg Oral Daily    sodium chloride flush  10 mL IntraVENous 2 times per day    cefTRIAXone (ROCEPHIN) IV  1,000 mg IntraVENous Q24H    enoxaparin Sodium  30 mg SubCUTAneous Daily    tetanus-diphth-acell pertussis  0.5 mL IntraMUSCular Once       PRN Meds:  sodium chloride flush, sodium chloride, ondansetron, polyethylene glycol, acetaminophen **OR** acetaminophen, oxyCODONE, morphine    IV:   sodium chloride      sodium chloride 75 mL/hr at 04/30/22 0301         Intake/Output Summary (Last 24 hours) at 5/1/2022 0701  Last data filed at 5/1/2022 0328  Gross per 24 hour   Intake 600 ml   Output 300 ml   Net 300 ml       Results:  CBC:   Recent Labs     04/29/22 2309 04/30/22  0551 05/01/22  0610   WBC 11.4* 7.7 7.7   HGB 9.9* 8.5* 8.2*   HCT 30.8* 26.1* 26.0*   MCV 88.8 89.8 93.5    147 140     BMP:   Recent Labs     04/29/22 2309 04/30/22  0551 05/01/22  0610    144 139   K 3.1* 4.2 4.1    112* 107   CO2 23 23 20*   BUN 16 14 21*   CREATININE 0.9 0.8 1.0     Mag: No results for input(s): MAG in the last 72 hours. Phos:   Lab Results   Component Value Date    PHOS 3.0 07/15/2015     No results found for: GLU    LIVER PROFILE: No results for input(s): AST, ALT, LIPASE, BILIDIR, BILITOT, ALKPHOS in the last 72 hours. Invalid input(s): AMYLASE,  ALB  PT/INR:   Recent Labs     04/29/22 2309   PROTIME 11.3   INR 1.00     APTT: No results for input(s): APTT in the last 72 hours.   UA:  Recent Labs     04/29/22 2309   COLORU Yellow   PHUR 7.0   WBCUA 144*   RBCUA 21*   BACTERIA 4+*   CLARITYU CLOUDY*   SPECGRAV 1.016   LEUKOCYTESUR LARGE*   UROBILINOGEN 1.0   BILIRUBINUR Negative   BLOODU Moderate   GLUCOSEU Negative       Invalid input(s): ABG  Lab Results   Component Value Date    CALCIUM 8.2 (L) 05/01/2022    PHOS 3.0 07/15/2015       Assessment:    Principal Problem:    Periprosthetic fracture around internal prosthetic left hip joint (HCC)  Active Problems:    Acute cystitis with hematuria    Acquired hypothyroidism    Essential hypertension    Hyperlipidemia    Displaced fracture (avulsion) of lateral epicondyle of left humerus, initial encounter for closed fracture    Hypokalemia    Minimally displaced fracture of lateral condyle of left humerus, initial encounter for closed fracture    Osteoporosis with current pathological fracture, initial encounter    Pre-operative examination    Fall    Elevated troponin    Mild protein-calorie malnutrition (HCC)    Left displaced femoral neck fracture with a hemiarthroplasty left hip 8-  Resolved Problems:    * No resolved hospital problems. [facility] [place] [facility] course: 80y.o. year-old female with a history of hypertension, hyperlipidemia, hypothyroidism and a h/o a left displaced femoral neck fracture with a hemiarthroplasty left hip 8-. She lives at home alone. She fell casing increased pain from her left hip. In the emergency room she was found to have a left periprosthetic hip fracture, a displaced left lateral epicondyle fracture and a laceration to her right shin. She is unsure of what caused her to fall. She states that she did not loose consciousness, but does not recall what caused her to fall. She was also found to have a uti. Plan:  Hip fracture  - pain control  - pt/ot after surgery  - osteoporosis  -Vitamin D, oscal, out pt dexascan.  Consider bisphosphonates   - consult ortho    UTI  - UCx with citrobacter  - rocephin day 3    Anemia, eligio  Chronic with acute worsening due to dilutional effects.  - getting 1 unit prbcs today, may need another unit after surgery     Chronic conditions - continue home meds unless otherwise stated  htn    Code status:  full  DVT prophylaxis: [x] Lovenox  [] SQ Heparin  [] SCDs because of  [] warfarin/oral direct thrombin inhibitor [] Encourage ambulation      Disposition:  [] Home [] Rehab [] Psych [] SNF  [] LTAC  [] Transfer to ICU  [] Transfer to PCU [] Other: in pt    Electronically signed by Sushant Ulloa DO on 5/1/2022 at 7:01 AM

## 2022-05-01 NOTE — PLAN OF CARE
Problem: Discharge Planning  Goal: Discharge to home or other facility with appropriate resources  Outcome: Progressing  Note: Assessed patients knowledge of discharge. Will continue to work with patient on discharge planning and answer patient questions. Will consult case management and  as necessary. Problem: Safety - Adult  Goal: Free from fall injury  Outcome: Progressing  Note: Fall risk assessment completed . Fall precautions in place, bed/ chair alarm on, side rails 2/4 up, call light in reach, educated pt on calling for assistance when needed, room clear of clutter. Pt verbalized understanding. Problem: ABCDS Injury Assessment  Goal: Absence of physical injury  Outcome: Progressing  Note: No falls noted this shift. Patient ambulates with x1 staff assistance without difficulty. Family member at bedside, spent the night. Bed kept in low position. Safe environment maintained. Bedside table & call light in reach. Uses call light appropriately when needing assistance. Problem: Skin/Tissue Integrity  Goal: Absence of new skin breakdown  Description: 1. Monitor for areas of redness and/or skin breakdown  2. Assess vascular access sites hourly  3. Every 4-6 hours minimum:  Change oxygen saturation probe site  4. Every 4-6 hours:  If on nasal continuous positive airway pressure, respiratory therapy assess nares and determine need for appliance change or resting period. Outcome: Progressing  Note: Will monitor skin and mucous members. Will turn patient every 2 hours, monitor for friction and sheering, and change dressings as needed. Will preform skin assessment every shift. Problem: Pain  Goal: Verbalizes/displays adequate comfort level or baseline comfort level  Outcome: Progressing  Note: ADL's completed by patient to best of ability. Will continue to encourage patient to complete ADL's and offer and provide assistance when needed.

## 2022-05-01 NOTE — PROGRESS NOTES
Pt rounded on this morning Q2h, whiteboard updated, pt given ice water and needs assessed. Pt sitting up in bed, poor appetite, c/o severe pain in the L elbow. Pt states the pain is worse than the L hip pain. MD notified, and orders placed for moderate/severe panel for PRN analgesic (see eMAR). Pt given PRN analgesic and resting comfortably. Pt scheduled for surgery tomorrow w/ Dr. Negro Cool, consent signed and in chart. Family at bedside, Pt has no questions or concerns at this time. Call light within reach, pt verbalized understanding to call prior to ambulating. Will continue to monitor and reassess.    Electronically signed by Geronimo Mayorga RN on 5/1/2022 at 11:16 AM

## 2022-05-01 NOTE — PROGRESS NOTES
ORTHOPAEDIC PROGRESS NOTE    Chief Complaint   Patient presents with    Fall       HPI  No major events overnight   Pain the same  No N/T      Past Medical History:   Diagnosis Date    Acquired hypothyroidism 4/30/2022    Arthritis     Endometrial thickening on ultrasound     Glaucoma     Hyperlipidemia     Hypertension     Osteoporosis with current pathological fracture, initial encounter 4/30/2022    Pneumonia     Post-menopausal bleeding     TIA (transient ischemic attack)        Past Surgical History:   Procedure Laterality Date    BREAST SURGERY      left lumpectomy    HIP ARTHROPLASTY Left 08/26/2016    hemiarthroplasty left hip    THYROIDECTOMY, PARTIAL      VARICOSE VEIN SURGERY         Allergies   Allergen Reactions    Sulfa Antibiotics Nausea Only       Current Outpatient Medications   Medication Instructions    amLODIPine (NORVASC) 5 mg, Oral, DAILY    aspirin 81 mg, DAILY    benazepril (LOTENSIN) 20 mg, Oral, DAILY    clobetasol (TEMOVATE) 0.05 % ointment Apply topically 2 times daily.  dorzolamide-timolol (COSOPT) 22.3-6.8 MG/ML ophthalmic solution 1 drop, Both Eyes, 2 TIMES DAILY    levothyroxine (SYNTHROID) 75 mcg, Oral, DAILY    lovastatin (MEVACOR) 40 mg, NIGHTLY    LUMIGAN 0.01 % SOLN ophthalmic drops 1 drop, Both Eyes, EVERY EVENING    omeprazole (PRILOSEC) 20 mg, DAILY       Vitals:    04/30/22 2351 05/01/22 0428 05/01/22 0647 05/01/22 0756   BP: 103/63 136/73  138/71   Pulse: 84 81  80   Resp: 17 17  16   Temp: 97.6 °F (36.4 °C) 97.9 °F (36.6 °C)  98 °F (36.7 °C)   TempSrc: Oral Oral  Oral   SpO2: 94% 94%  95%   Weight:   105 lb 2.6 oz (47.7 kg)    Height:           Physical Exam:  Body mass index is 20.54 kg/m².   NAD, family bedside  MARK HURLEY M/U/R/A nerve distributions; AIN/PIN/IO intact   Long arm splint c/d/i  LLE SILT SP/DP/T nerve distributions; EHL/FHL/TA/GS intact  CV - RRR, left DP pulse intact      Labs:  Recent Labs     05/01/22  0610 04/30/22  0561 04/29/22  2309   WBC 7.7 7.7 11.4*   HGB 8.2* 8.5* 9.9*   HCT 26.0* 26.1* 30.8*   MCV 93.5 89.8 88.8    147 204     Lab Results   Component Value Date     05/01/2022    K 4.1 05/01/2022     05/01/2022    CO2 20 (L) 05/01/2022    BUN 21 (H) 05/01/2022    CREATININE 1.0 05/01/2022    GLUCOSE 99 05/01/2022    CALCIUM 8.2 (L) 05/01/2022    PROT 5.5 (L) 11/16/2021    LABALBU 3.2 (L) 11/16/2021    BILITOT <0.2 11/16/2021    ALKPHOS 64 11/16/2021    AST 12 (L) 11/16/2021    ALT 7 (L) 11/16/2021    LABGLOM 52 (A) 05/01/2022    GFRAA >60 05/01/2022    AGRATIO 1.4 11/16/2021    GLOB 3.1 08/10/2017     Lab Results   Component Value Date    INR 1.00 04/29/2022    INR 0.89 08/26/2016     Lab Results   Component Value Date    APTT 25.4 (L) 05/01/2022    APTT 26.6 08/26/2016     No results found for: LABA1C  No results found for: VITD25       Imaging:  Prior images were reviewed  No new imaging overnight      Assessment & Plan:  80 y.o. 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  *      Plan for surgery tomorrow - open treatment of the left periprosthetic femur fracture with internal fixation versus revision arthroplasty, indicated procedures  Will have PRBC on call to OR given her anemia  NPO @ midnight  opportunity for questions afforded  Bedrest for now  SCDs  Lovenox and ASA given today, will resume postop    Continue nonop tx of left elbow lateral condyle fx for now  Long arm splint, MARK PEREZ  Finger ROM exercises      Baron Rushing MD no

## 2022-05-02 ENCOUNTER — ANESTHESIA (OUTPATIENT)
Dept: OPERATING ROOM | Age: 87
DRG: 481 | End: 2022-05-02
Payer: MEDICARE

## 2022-05-02 ENCOUNTER — APPOINTMENT (OUTPATIENT)
Dept: GENERAL RADIOLOGY | Age: 87
DRG: 481 | End: 2022-05-02
Payer: MEDICARE

## 2022-05-02 ENCOUNTER — ANESTHESIA EVENT (OUTPATIENT)
Dept: OPERATING ROOM | Age: 87
DRG: 481 | End: 2022-05-02
Payer: MEDICARE

## 2022-05-02 VITALS
TEMPERATURE: 97.5 F | DIASTOLIC BLOOD PRESSURE: 77 MMHG | SYSTOLIC BLOOD PRESSURE: 133 MMHG | RESPIRATION RATE: 5 BRPM | OXYGEN SATURATION: 100 %

## 2022-05-02 LAB
ANION GAP SERPL CALCULATED.3IONS-SCNC: 13 MMOL/L (ref 3–16)
ANION GAP SERPL CALCULATED.3IONS-SCNC: 9 MMOL/L (ref 3–16)
BASOPHILS ABSOLUTE: 0 K/UL (ref 0–0.2)
BASOPHILS RELATIVE PERCENT: 0.6 %
BLOOD BANK DISPENSE STATUS: NORMAL
BLOOD BANK DISPENSE STATUS: NORMAL
BLOOD BANK PRODUCT CODE: NORMAL
BLOOD BANK PRODUCT CODE: NORMAL
BPU ID: NORMAL
BPU ID: NORMAL
BUN BLDV-MCNC: 19 MG/DL (ref 7–20)
BUN BLDV-MCNC: 22 MG/DL (ref 7–20)
CALCIUM SERPL-MCNC: 7.9 MG/DL (ref 8.3–10.6)
CALCIUM SERPL-MCNC: 7.9 MG/DL (ref 8.3–10.6)
CHLORIDE BLD-SCNC: 106 MMOL/L (ref 99–110)
CHLORIDE BLD-SCNC: 108 MMOL/L (ref 99–110)
CO2: 18 MMOL/L (ref 21–32)
CO2: 22 MMOL/L (ref 21–32)
CREAT SERPL-MCNC: 0.7 MG/DL (ref 0.6–1.2)
CREAT SERPL-MCNC: 0.8 MG/DL (ref 0.6–1.2)
DESCRIPTION BLOOD BANK: NORMAL
DESCRIPTION BLOOD BANK: NORMAL
EOSINOPHILS ABSOLUTE: 0.1 K/UL (ref 0–0.6)
EOSINOPHILS RELATIVE PERCENT: 2 %
GFR AFRICAN AMERICAN: >60
GFR AFRICAN AMERICAN: >60
GFR NON-AFRICAN AMERICAN: >60
GFR NON-AFRICAN AMERICAN: >60
GLUCOSE BLD-MCNC: 87 MG/DL (ref 70–99)
GLUCOSE BLD-MCNC: 92 MG/DL (ref 70–99)
HCT VFR BLD CALC: 20.5 % (ref 36–48)
HCT VFR BLD CALC: 33.3 % (ref 36–48)
HEMOGLOBIN: 10.9 G/DL (ref 12–16)
HEMOGLOBIN: 6.8 G/DL (ref 12–16)
LV EF: 58 %
LVEF MODALITY: NORMAL
LYMPHOCYTES ABSOLUTE: 1.4 K/UL (ref 1–5.1)
LYMPHOCYTES RELATIVE PERCENT: 24.6 %
MCH RBC QN AUTO: 29.6 PG (ref 26–34)
MCHC RBC AUTO-ENTMCNC: 33.3 G/DL (ref 31–36)
MCV RBC AUTO: 88.8 FL (ref 80–100)
MONOCYTES ABSOLUTE: 0.7 K/UL (ref 0–1.3)
MONOCYTES RELATIVE PERCENT: 11.9 %
NEUTROPHILS ABSOLUTE: 3.6 K/UL (ref 1.7–7.7)
NEUTROPHILS RELATIVE PERCENT: 60.9 %
ORGANISM: ABNORMAL
PDW BLD-RTO: 16.1 % (ref 12.4–15.4)
PLATELET # BLD: 127 K/UL (ref 135–450)
PMV BLD AUTO: 7.5 FL (ref 5–10.5)
POTASSIUM REFLEX MAGNESIUM: 4 MMOL/L (ref 3.5–5.1)
POTASSIUM REFLEX MAGNESIUM: 4.1 MMOL/L (ref 3.5–5.1)
RBC # BLD: 2.3 M/UL (ref 4–5.2)
SODIUM BLD-SCNC: 137 MMOL/L (ref 136–145)
SODIUM BLD-SCNC: 139 MMOL/L (ref 136–145)
URINE CULTURE, ROUTINE: ABNORMAL
VITAMIN D 25-HYDROXY: 6 NG/ML
WBC # BLD: 5.9 K/UL (ref 4–11)

## 2022-05-02 PROCEDURE — 6360000002 HC RX W HCPCS: Performed by: ORTHOPAEDIC SURGERY

## 2022-05-02 PROCEDURE — 6370000000 HC RX 637 (ALT 250 FOR IP): Performed by: INTERNAL MEDICINE

## 2022-05-02 PROCEDURE — 82306 VITAMIN D 25 HYDROXY: CPT

## 2022-05-02 PROCEDURE — 2709999900 HC NON-CHARGEABLE SUPPLY: Performed by: ORTHOPAEDIC SURGERY

## 2022-05-02 PROCEDURE — 93306 TTE W/DOPPLER COMPLETE: CPT

## 2022-05-02 PROCEDURE — 36415 COLL VENOUS BLD VENIPUNCTURE: CPT

## 2022-05-02 PROCEDURE — 7100000000 HC PACU RECOVERY - FIRST 15 MIN: Performed by: ORTHOPAEDIC SURGERY

## 2022-05-02 PROCEDURE — 2500000003 HC RX 250 WO HCPCS: Performed by: NURSE ANESTHETIST, CERTIFIED REGISTERED

## 2022-05-02 PROCEDURE — 7100000001 HC PACU RECOVERY - ADDTL 15 MIN: Performed by: ORTHOPAEDIC SURGERY

## 2022-05-02 PROCEDURE — C1713 ANCHOR/SCREW BN/BN,TIS/BN: HCPCS | Performed by: ORTHOPAEDIC SURGERY

## 2022-05-02 PROCEDURE — C1776 JOINT DEVICE (IMPLANTABLE): HCPCS | Performed by: ORTHOPAEDIC SURGERY

## 2022-05-02 PROCEDURE — 85018 HEMOGLOBIN: CPT

## 2022-05-02 PROCEDURE — 3700000001 HC ADD 15 MINUTES (ANESTHESIA): Performed by: ORTHOPAEDIC SURGERY

## 2022-05-02 PROCEDURE — A4217 STERILE WATER/SALINE, 500 ML: HCPCS | Performed by: ORTHOPAEDIC SURGERY

## 2022-05-02 PROCEDURE — 2500000003 HC RX 250 WO HCPCS: Performed by: ORTHOPAEDIC SURGERY

## 2022-05-02 PROCEDURE — 2580000003 HC RX 258: Performed by: ORTHOPAEDIC SURGERY

## 2022-05-02 PROCEDURE — 3600000004 HC SURGERY LEVEL 4 BASE: Performed by: ORTHOPAEDIC SURGERY

## 2022-05-02 PROCEDURE — 85025 COMPLETE CBC W/AUTO DIFF WBC: CPT

## 2022-05-02 PROCEDURE — 0QS704Z REPOSITION LEFT UPPER FEMUR WITH INTERNAL FIXATION DEVICE, OPEN APPROACH: ICD-10-PCS | Performed by: ORTHOPAEDIC SURGERY

## 2022-05-02 PROCEDURE — 85014 HEMATOCRIT: CPT

## 2022-05-02 PROCEDURE — 1200000000 HC SEMI PRIVATE

## 2022-05-02 PROCEDURE — 27244 TREAT THIGH FRACTURE: CPT | Performed by: ORTHOPAEDIC SURGERY

## 2022-05-02 PROCEDURE — 80048 BASIC METABOLIC PNL TOTAL CA: CPT

## 2022-05-02 PROCEDURE — 6360000002 HC RX W HCPCS: Performed by: NURSE ANESTHETIST, CERTIFIED REGISTERED

## 2022-05-02 PROCEDURE — 3209999900 FLUORO FOR SURGICAL PROCEDURES

## 2022-05-02 PROCEDURE — C9359 IMPLNT,BON VOID FILLER-PUTTY: HCPCS | Performed by: ORTHOPAEDIC SURGERY

## 2022-05-02 PROCEDURE — 3700000000 HC ANESTHESIA ATTENDED CARE: Performed by: ORTHOPAEDIC SURGERY

## 2022-05-02 PROCEDURE — 6370000000 HC RX 637 (ALT 250 FOR IP): Performed by: ORTHOPAEDIC SURGERY

## 2022-05-02 PROCEDURE — 36430 TRANSFUSION BLD/BLD COMPNT: CPT

## 2022-05-02 PROCEDURE — 73501 X-RAY EXAM HIP UNI 1 VIEW: CPT

## 2022-05-02 PROCEDURE — 3600000014 HC SURGERY LEVEL 4 ADDTL 15MIN: Performed by: ORTHOPAEDIC SURGERY

## 2022-05-02 PROCEDURE — 2580000003 HC RX 258: Performed by: INTERNAL MEDICINE

## 2022-05-02 PROCEDURE — 2580000003 HC RX 258: Performed by: NURSE ANESTHETIST, CERTIFIED REGISTERED

## 2022-05-02 DEVICE — DBX PUTTY, 1CC
Type: IMPLANTABLE DEVICE | Site: HIP | Status: FUNCTIONAL
Brand: DBX®

## 2022-05-02 DEVICE — CABLE SURG DIA1.7MM S STL HA CERCLAGE W/ CRMP 29880101S] DEPUY SYNTHES USA]: Type: IMPLANTABLE DEVICE | Site: HIP | Status: FUNCTIONAL

## 2022-05-02 RX ORDER — SODIUM CHLORIDE 9 MG/ML
INJECTION, SOLUTION INTRAVENOUS PRN
Status: COMPLETED | OUTPATIENT
Start: 2022-05-02 | End: 2022-05-02

## 2022-05-02 RX ORDER — ENOXAPARIN SODIUM 100 MG/ML
30 INJECTION SUBCUTANEOUS DAILY
Status: DISCONTINUED | OUTPATIENT
Start: 2022-05-03 | End: 2022-05-04 | Stop reason: HOSPADM

## 2022-05-02 RX ORDER — PHENYLEPHRINE HCL IN 0.9% NACL 1 MG/10 ML
SYRINGE (ML) INTRAVENOUS PRN
Status: DISCONTINUED | OUTPATIENT
Start: 2022-05-02 | End: 2022-05-02 | Stop reason: SDUPTHER

## 2022-05-02 RX ORDER — CEFTRIAXONE 1 G/1
INJECTION, POWDER, FOR SOLUTION INTRAMUSCULAR; INTRAVENOUS PRN
Status: DISCONTINUED | OUTPATIENT
Start: 2022-05-02 | End: 2022-05-02 | Stop reason: SDUPTHER

## 2022-05-02 RX ORDER — FENTANYL CITRATE 50 UG/ML
INJECTION, SOLUTION INTRAMUSCULAR; INTRAVENOUS PRN
Status: DISCONTINUED | OUTPATIENT
Start: 2022-05-02 | End: 2022-05-02 | Stop reason: SDUPTHER

## 2022-05-02 RX ORDER — SODIUM CHLORIDE 0.9 % (FLUSH) 0.9 %
5-40 SYRINGE (ML) INJECTION PRN
Status: DISCONTINUED | OUTPATIENT
Start: 2022-05-02 | End: 2022-05-02

## 2022-05-02 RX ORDER — TRAMADOL HYDROCHLORIDE 50 MG/1
50 TABLET ORAL EVERY 6 HOURS PRN
Qty: 20 TABLET | Refills: 0 | Status: SHIPPED | OUTPATIENT
Start: 2022-05-02 | End: 2022-05-07

## 2022-05-02 RX ORDER — DEXAMETHASONE SODIUM PHOSPHATE 4 MG/ML
INJECTION, SOLUTION INTRA-ARTICULAR; INTRALESIONAL; INTRAMUSCULAR; INTRAVENOUS; SOFT TISSUE PRN
Status: DISCONTINUED | OUTPATIENT
Start: 2022-05-02 | End: 2022-05-02 | Stop reason: SDUPTHER

## 2022-05-02 RX ORDER — TRAMADOL HYDROCHLORIDE 50 MG/1
50 TABLET ORAL EVERY 6 HOURS PRN
Status: DISCONTINUED | OUTPATIENT
Start: 2022-05-02 | End: 2022-05-04 | Stop reason: HOSPADM

## 2022-05-02 RX ORDER — VANCOMYCIN HYDROCHLORIDE 1 G/20ML
INJECTION, POWDER, LYOPHILIZED, FOR SOLUTION INTRAVENOUS
Status: COMPLETED | OUTPATIENT
Start: 2022-05-02 | End: 2022-05-02

## 2022-05-02 RX ORDER — ONDANSETRON 2 MG/ML
4 INJECTION INTRAMUSCULAR; INTRAVENOUS
Status: DISCONTINUED | OUTPATIENT
Start: 2022-05-02 | End: 2022-05-02

## 2022-05-02 RX ORDER — PROPOFOL 10 MG/ML
INJECTION, EMULSION INTRAVENOUS PRN
Status: DISCONTINUED | OUTPATIENT
Start: 2022-05-02 | End: 2022-05-02 | Stop reason: SDUPTHER

## 2022-05-02 RX ORDER — SODIUM CHLORIDE 0.9 % (FLUSH) 0.9 %
5-40 SYRINGE (ML) INJECTION EVERY 12 HOURS SCHEDULED
Status: DISCONTINUED | OUTPATIENT
Start: 2022-05-02 | End: 2022-05-02

## 2022-05-02 RX ORDER — LIDOCAINE HYDROCHLORIDE 20 MG/ML
INJECTION, SOLUTION EPIDURAL; INFILTRATION; INTRACAUDAL; PERINEURAL PRN
Status: DISCONTINUED | OUTPATIENT
Start: 2022-05-02 | End: 2022-05-02 | Stop reason: SDUPTHER

## 2022-05-02 RX ORDER — ASPIRIN 81 MG/1
81 TABLET, CHEWABLE ORAL DAILY
Status: DISCONTINUED | OUTPATIENT
Start: 2022-05-04 | End: 2022-05-04 | Stop reason: HOSPADM

## 2022-05-02 RX ORDER — SODIUM CHLORIDE 9 MG/ML
INJECTION, SOLUTION INTRAVENOUS PRN
Status: DISCONTINUED | OUTPATIENT
Start: 2022-05-02 | End: 2022-05-04 | Stop reason: HOSPADM

## 2022-05-02 RX ORDER — SODIUM CHLORIDE 9 MG/ML
INJECTION, SOLUTION INTRAVENOUS PRN
Status: DISCONTINUED | OUTPATIENT
Start: 2022-05-02 | End: 2022-05-02

## 2022-05-02 RX ORDER — SODIUM CHLORIDE, SODIUM LACTATE, POTASSIUM CHLORIDE, CALCIUM CHLORIDE 600; 310; 30; 20 MG/100ML; MG/100ML; MG/100ML; MG/100ML
INJECTION, SOLUTION INTRAVENOUS CONTINUOUS PRN
Status: DISCONTINUED | OUTPATIENT
Start: 2022-05-02 | End: 2022-05-02 | Stop reason: SDUPTHER

## 2022-05-02 RX ORDER — TRANEXAMIC ACID 100 MG/ML
INJECTION, SOLUTION INTRAVENOUS
Status: COMPLETED | OUTPATIENT
Start: 2022-05-02 | End: 2022-05-02

## 2022-05-02 RX ORDER — BUPIVACAINE HYDROCHLORIDE 5 MG/ML
INJECTION, SOLUTION EPIDURAL; INTRACAUDAL
Status: COMPLETED | OUTPATIENT
Start: 2022-05-02 | End: 2022-05-02

## 2022-05-02 RX ORDER — ONDANSETRON 2 MG/ML
INJECTION INTRAMUSCULAR; INTRAVENOUS PRN
Status: DISCONTINUED | OUTPATIENT
Start: 2022-05-02 | End: 2022-05-02 | Stop reason: SDUPTHER

## 2022-05-02 RX ADMIN — FENTANYL CITRATE 25 MCG: 50 INJECTION INTRAMUSCULAR; INTRAVENOUS at 14:24

## 2022-05-02 RX ADMIN — LATANOPROST 1 DROP: 50 SOLUTION OPHTHALMIC at 21:26

## 2022-05-02 RX ADMIN — SODIUM CHLORIDE: 9 INJECTION, SOLUTION INTRAVENOUS at 12:56

## 2022-05-02 RX ADMIN — DORZOLAMIDE HYDROCHLORIDE AND TIMOLOL MALEATE 1 DROP: 20; 5 SOLUTION/ DROPS OPHTHALMIC at 21:17

## 2022-05-02 RX ADMIN — DEXAMETHASONE SODIUM PHOSPHATE 8 MG: 4 INJECTION, SOLUTION INTRAMUSCULAR; INTRAVENOUS at 13:13

## 2022-05-02 RX ADMIN — SODIUM CHLORIDE: 9 INJECTION, SOLUTION INTRAVENOUS at 21:23

## 2022-05-02 RX ADMIN — Medication 100 MCG: at 13:21

## 2022-05-02 RX ADMIN — SODIUM CHLORIDE, SODIUM LACTATE, POTASSIUM CHLORIDE, AND CALCIUM CHLORIDE: .6; .31; .03; .02 INJECTION, SOLUTION INTRAVENOUS at 15:11

## 2022-05-02 RX ADMIN — ONDANSETRON 4 MG: 2 INJECTION INTRAMUSCULAR; INTRAVENOUS at 14:48

## 2022-05-02 RX ADMIN — SUGAMMADEX 100 MG: 100 INJECTION, SOLUTION INTRAVENOUS at 14:40

## 2022-05-02 RX ADMIN — DORZOLAMIDE HYDROCHLORIDE AND TIMOLOL MALEATE 1 DROP: 20; 5 SOLUTION/ DROPS OPHTHALMIC at 10:44

## 2022-05-02 RX ADMIN — LISINOPRIL 20 MG: 20 TABLET ORAL at 10:17

## 2022-05-02 RX ADMIN — CEFAZOLIN SODIUM 2000 MG: 10 INJECTION, POWDER, FOR SOLUTION INTRAVENOUS at 21:24

## 2022-05-02 RX ADMIN — LEVOTHYROXINE SODIUM 75 MCG: 0.07 TABLET ORAL at 05:42

## 2022-05-02 RX ADMIN — ACETAMINOPHEN 650 MG: 325 TABLET ORAL at 05:42

## 2022-05-02 RX ADMIN — Medication 5000 UNITS: at 21:17

## 2022-05-02 RX ADMIN — SODIUM CHLORIDE, PRESERVATIVE FREE 10 ML: 5 INJECTION INTRAVENOUS at 10:44

## 2022-05-02 RX ADMIN — Medication 100 MCG: at 13:16

## 2022-05-02 RX ADMIN — FENTANYL CITRATE 25 MCG: 50 INJECTION INTRAMUSCULAR; INTRAVENOUS at 15:03

## 2022-05-02 RX ADMIN — CEFTRIAXONE 1 G: 1 INJECTION, POWDER, FOR SOLUTION INTRAMUSCULAR; INTRAVENOUS at 13:08

## 2022-05-02 RX ADMIN — OXYCODONE HYDROCHLORIDE 10 MG: 10 TABLET ORAL at 05:42

## 2022-05-02 RX ADMIN — CEFAZOLIN 2000 MG: 10 INJECTION, POWDER, FOR SOLUTION INTRAVENOUS at 12:51

## 2022-05-02 RX ADMIN — PROPOFOL 80 MG: 10 INJECTION, EMULSION INTRAVENOUS at 13:01

## 2022-05-02 RX ADMIN — ACETAMINOPHEN 650 MG: 325 TABLET ORAL at 21:17

## 2022-05-02 RX ADMIN — FENTANYL CITRATE 25 MCG: 50 INJECTION INTRAMUSCULAR; INTRAVENOUS at 14:53

## 2022-05-02 RX ADMIN — Medication 200 MCG: at 14:32

## 2022-05-02 RX ADMIN — SUGAMMADEX 100 MG: 100 INJECTION, SOLUTION INTRAVENOUS at 14:35

## 2022-05-02 RX ADMIN — PANTOPRAZOLE SODIUM 40 MG: 40 TABLET, DELAYED RELEASE ORAL at 05:43

## 2022-05-02 RX ADMIN — AMLODIPINE BESYLATE 5 MG: 5 TABLET ORAL at 10:17

## 2022-05-02 RX ADMIN — ATORVASTATIN CALCIUM 20 MG: 20 TABLET, FILM COATED ORAL at 21:17

## 2022-05-02 RX ADMIN — LIDOCAINE HYDROCHLORIDE 50 MG: 20 INJECTION, SOLUTION EPIDURAL; INFILTRATION; INTRACAUDAL; PERINEURAL at 13:01

## 2022-05-02 RX ADMIN — FENTANYL CITRATE 25 MCG: 50 INJECTION INTRAMUSCULAR; INTRAVENOUS at 14:15

## 2022-05-02 RX ADMIN — Medication 200 MCG: at 14:03

## 2022-05-02 ASSESSMENT — PULMONARY FUNCTION TESTS
PIF_VALUE: 2
PIF_VALUE: 15
PIF_VALUE: 8
PIF_VALUE: 17
PIF_VALUE: 15
PIF_VALUE: 2
PIF_VALUE: 14
PIF_VALUE: 15
PIF_VALUE: 17
PIF_VALUE: 29
PIF_VALUE: 17
PIF_VALUE: 17
PIF_VALUE: 2
PIF_VALUE: 16
PIF_VALUE: 14
PIF_VALUE: 15
PIF_VALUE: 14
PIF_VALUE: 14
PIF_VALUE: 15
PIF_VALUE: 14
PIF_VALUE: 15
PIF_VALUE: 6
PIF_VALUE: 15
PIF_VALUE: 16
PIF_VALUE: 14
PIF_VALUE: 2
PIF_VALUE: 15
PIF_VALUE: 13
PIF_VALUE: 2
PIF_VALUE: 2
PIF_VALUE: 15
PIF_VALUE: 14
PIF_VALUE: 15
PIF_VALUE: 4
PIF_VALUE: 15
PIF_VALUE: 2
PIF_VALUE: 15
PIF_VALUE: 5
PIF_VALUE: 15
PIF_VALUE: 15
PIF_VALUE: 14
PIF_VALUE: 16
PIF_VALUE: 14
PIF_VALUE: 2
PIF_VALUE: 14
PIF_VALUE: 15
PIF_VALUE: 0
PIF_VALUE: 2
PIF_VALUE: 15
PIF_VALUE: 15
PIF_VALUE: 2
PIF_VALUE: 2
PIF_VALUE: 3
PIF_VALUE: 17
PIF_VALUE: 16
PIF_VALUE: 2
PIF_VALUE: 14
PIF_VALUE: 15
PIF_VALUE: 2
PIF_VALUE: 15
PIF_VALUE: 14
PIF_VALUE: 16
PIF_VALUE: 16
PIF_VALUE: 2
PIF_VALUE: 14
PIF_VALUE: 15
PIF_VALUE: 2
PIF_VALUE: 15
PIF_VALUE: 14
PIF_VALUE: 2
PIF_VALUE: 6
PIF_VALUE: 15
PIF_VALUE: 2
PIF_VALUE: 24
PIF_VALUE: 2
PIF_VALUE: 2
PIF_VALUE: 3
PIF_VALUE: 15
PIF_VALUE: 0
PIF_VALUE: 15
PIF_VALUE: 16
PIF_VALUE: 15
PIF_VALUE: 15
PIF_VALUE: 18
PIF_VALUE: 2
PIF_VALUE: 15
PIF_VALUE: 18
PIF_VALUE: 15
PIF_VALUE: 1
PIF_VALUE: 14
PIF_VALUE: 15
PIF_VALUE: 28
PIF_VALUE: 1
PIF_VALUE: 2
PIF_VALUE: 2
PIF_VALUE: 14
PIF_VALUE: 3
PIF_VALUE: 14
PIF_VALUE: 14
PIF_VALUE: 2
PIF_VALUE: 15
PIF_VALUE: 2
PIF_VALUE: 16
PIF_VALUE: 16
PIF_VALUE: 14
PIF_VALUE: 2
PIF_VALUE: 16
PIF_VALUE: 14
PIF_VALUE: 15
PIF_VALUE: 0
PIF_VALUE: 14
PIF_VALUE: 15
PIF_VALUE: 0
PIF_VALUE: 15
PIF_VALUE: 15
PIF_VALUE: 14
PIF_VALUE: 16
PIF_VALUE: 15
PIF_VALUE: 16
PIF_VALUE: 2
PIF_VALUE: 16
PIF_VALUE: 14
PIF_VALUE: 2
PIF_VALUE: 5
PIF_VALUE: 2
PIF_VALUE: 14

## 2022-05-02 ASSESSMENT — PAIN DESCRIPTION - LOCATION
LOCATION: ARM;LEG
LOCATION: ARM;HIP

## 2022-05-02 ASSESSMENT — PAIN SCALES - GENERAL
PAINLEVEL_OUTOF10: 7
PAINLEVEL_OUTOF10: 4
PAINLEVEL_OUTOF10: 7
PAINLEVEL_OUTOF10: 6

## 2022-05-02 ASSESSMENT — PAIN DESCRIPTION - FREQUENCY
FREQUENCY: CONTINUOUS

## 2022-05-02 ASSESSMENT — LIFESTYLE VARIABLES: SMOKING_STATUS: 0

## 2022-05-02 ASSESSMENT — PAIN DESCRIPTION - ORIENTATION
ORIENTATION: LEFT

## 2022-05-02 ASSESSMENT — PAIN - FUNCTIONAL ASSESSMENT
PAIN_FUNCTIONAL_ASSESSMENT: PREVENTS OR INTERFERES WITH MANY ACTIVE NOT PASSIVE ACTIVITIES
PAIN_FUNCTIONAL_ASSESSMENT: PREVENTS OR INTERFERES SOME ACTIVE ACTIVITIES AND ADLS
PAIN_FUNCTIONAL_ASSESSMENT: ACTIVITIES ARE NOT PREVENTED
PAIN_FUNCTIONAL_ASSESSMENT: 0-10
PAIN_FUNCTIONAL_ASSESSMENT: ACTIVITIES ARE NOT PREVENTED
PAIN_FUNCTIONAL_ASSESSMENT: PREVENTS OR INTERFERES WITH MANY ACTIVE NOT PASSIVE ACTIVITIES

## 2022-05-02 ASSESSMENT — PAIN DESCRIPTION - PAIN TYPE
TYPE: ACUTE PAIN
TYPE: SURGICAL PAIN;ACUTE PAIN
TYPE: ACUTE PAIN
TYPE: ACUTE PAIN

## 2022-05-02 ASSESSMENT — PAIN DESCRIPTION - ONSET
ONSET: ON-GOING

## 2022-05-02 ASSESSMENT — PAIN DESCRIPTION - DESCRIPTORS
DESCRIPTORS: ACHING
DESCRIPTORS: ACHING;DISCOMFORT
DESCRIPTORS: ACHING
DESCRIPTORS: ACHING;DISCOMFORT;SORE
DESCRIPTORS: ACHING

## 2022-05-02 ASSESSMENT — PAIN SCALES - WONG BAKER: WONGBAKER_NUMERICALRESPONSE: 0

## 2022-05-02 NOTE — PROGRESS NOTES
Patient resting in bed. VS showed increased BP this AM. Medications given as ordered. Tolerated morning medications without complaint with small sips of water as patient is NPO for surgery. Patient to receive 1 unit of PBRC's prior to surgery. IV noted to be leaking. Removed PIV from Right AC and will initiate new access prior to blood administration. Standard safety precautions in place. Will monitor.  Electronically signed by Peter Stafford RN on 5/2/2022 at 10:57 AM

## 2022-05-02 NOTE — PLAN OF CARE
Problem: Discharge Planning  Goal: Discharge to home or other facility with appropriate resources  Flowsheets (Taken 5/2/2022 1907)  Discharge to home or other facility with appropriate resources:   Identify barriers to discharge with patient and caregiver   Arrange for needed discharge resources and transportation as appropriate   Identify discharge learning needs (meds, wound care, etc)  Note: Patient barriers to discharge will be established after assessment from interdisciplinary team.      Problem: Skin/Tissue Integrity  Goal: Absence of new skin breakdown  Description: 1. Monitor for areas of redness and/or skin breakdown  2. Assess vascular access sites hourly  3. Every 4-6 hours minimum:  Change oxygen saturation probe site  4. Every 4-6 hours:  If on nasal continuous positive airway pressure, respiratory therapy assess nares and determine need for appliance change or resting period. Note: Patient skin condition and mucus membrane integrity remain unchanged during this shift. Skin breakdown prevention interventions are in place. Will continue to monitor and assess. Problem: Pain  Goal: Verbalizes/displays adequate comfort level or baseline comfort level  Flowsheets (Taken 5/2/2022 1907)  Verbalizes/displays adequate comfort level or baseline comfort level:   Encourage patient to monitor pain and request assistance   Assess pain using appropriate pain scale   Administer analgesics based on type and severity of pain and evaluate response  Note: Pain managed with pharmacologic and non-pharmacologic interventions during this shift. Will continue to monitor and assess for needs and change in patient condition.

## 2022-05-02 NOTE — ANESTHESIA POSTPROCEDURE EVALUATION
Department of Anesthesiology  Postprocedure Note    Patient: Wing Spivey  MRN: [id number]  [place]: 2/12/1929  Date of evaluation: 5/2/2022  Time:  3:41 PM     Procedure Summary     Date: 05/02/22 Room / Location: [address] [place] [address]    Anesthesia Start: 2796 Anesthesia Stop: 6560    Procedure: OPEN TREATMENT LEFT PERIPROSTHETIC FEMUR FRACTURE WITH INTERNAL FIXATION (Left Hip) Diagnosis: (UNKNOWN)    Surgeons: Jaime Molina MD Responsible Provider: Claudia Meeks MD    Anesthesia Type: general ASA Status: 3          Anesthesia Type: general    Oriana Phase I: Oriana Score: 8    Oriana Phase II:      Last vitals: Reviewed and per EMR flowsheets. Anesthesia Post Evaluation    Patient location during evaluation: PACU  Patient participation: complete - patient participated  Level of consciousness: awake and alert  Airway patency: patent  Nausea & Vomiting: no nausea and no vomiting  Complications: no  Cardiovascular status: hemodynamically stable, hypertensive and blood pressure returned to baseline (Frequent PVCs noted on monitor, BMP ordered)  Respiratory status: nonlabored ventilation and nasal cannula  Hydration status: hypervolemic (Swelling / excess fluid noted most prominently in left upper extremity)  Comments: Ms. Xiomara Hood resting comfortably in PACU. H&H and BMP to be drawn. Anticipate return to floor when room is ready.        Claudia Meeks MD

## 2022-05-02 NOTE — CARE COORDINATION
Attempted to see patient for skin tear to R hip. Pt currently in surgery. Will attempt to see patient at a later time.    Electronically signed by Maryann Campoverde RN CWOCN on 5/2/2022 at 12:59 PM

## 2022-05-02 NOTE — DISCHARGE INSTR - COC
[facility]) Continuity of Care Form    Patient Name:  Martín Keyes  : 1929    MRN:  [id number]    Admit date:  2022  Discharge date:  22    Code Status Order: DNR-CCA  Advance Directives: Yes    Admitting Physician: John Roberto MD  PCP: Gerardo Bates MD    Discharging Nurse: [facility] Unit/Room#: N6K-7407/3119-01  Discharging Unit Phone Number: [phone]    Emergency Contact:        Past Surgical History:  Past Surgical History:   Procedure Laterality Date    BREAST SURGERY      left lumpectomy    HIP ARTHROPLASTY Left 2016    hemiarthroplasty left hip    THYROIDECTOMY, PARTIAL      VARICOSE VEIN SURGERY         Immunization History:   Immunization History   Administered Date(s) Administered    COVID-19, Pfizer Purple top, DILUTE for use, 12+ yrs, 30mcg/0.3mL dose 2021, 2021, 10/02/2021    Influenza, High Dose (Fluzone 65 yrs and older) 10/16/2019    Influenza, Quadv, IM, PF (6 mo and older Fluzone, Flulaval, Fluarix, and 3 yrs and older Afluria) 2021    Tdap (Boostrix, Adacel) 2016       Active Problems:  Principal Problem:    Priscilla-prosthetic fracture around prosthetic hip  Active Problems:    Acute cystitis with hematuria    Acquired hypothyroidism    Essential hypertension    Hyperlipidemia    Hypokalemia    Minimally displaced fracture of lateral condyle of left humerus, initial encounter for closed fracture    Osteoporosis with current pathological fracture, initial encounter    Pre-operative examination    Fall    Elevated troponin    Mild protein-calorie malnutrition ([address]    Anemia    Left displaced femoral neck fracture with a hemiarthroplasty left hip 2016  Resolved Problems:    * No resolved hospital problems.  *      Isolation/Infection:       Nurse Assessment:  Last Vital Signs:BP (!) 145/75   Pulse 84   Temp 97.4 °F (36.3 °C) (Oral)   Resp 15   Ht 5' (1.524 m)   Wt 105 lb 6.1 oz (47.8 kg)   SpO2 97%   BMI 20.58 kg/m²   Last documented pain score (0-10 scale): Pain Level: 7  Last Weight:   Wt Readings from Last 1 Encounters:   05/02/22 105 lb 6.1 oz (47.8 kg)     Mental Status:  oriented and alert     IV Access:  - None    Nursing Mobility/ADLs:  Walking   Dependent  Transfer  Dependent  Bathing  Dependent  Dressing  Dependent  Toileting  Dependent  Feeding  Assisted  Med Admin  Assisted  Med Delivery   whole    Wound Care Documentation and Therapy:  Keep tan Mepilex dressing clean and intact for 7 days at left hip after surgery then remove and leave wound to air. Mepilex is waterproof for showering. Wound 01/02/20 Pretibial Right lower skin tear (Active)   Number of days: 850       Wound 01/02/20 Pretibial Right large bruise with abrasion  (Active)   Number of days: 850        Elimination:  Urinary Catheter: None and Removal Date 5/4/22    Colostomy/Ileostomy: No  Continence: Bowel: Yes  Bladder: Yes  Date of Last BM: 4/29/22    Intake/Output Summary (Last 24 hours)     Intake/Output Summary (Last 24 hours) at 5/2/2022 1813  Last data filed at 5/2/2022 1703  Gross per 24 hour   Intake 2421.25 ml   Output 1825 ml   Net 596.25 ml     Safety Concerns:     History of Falls (last 30 days) and At Risk for Falls    Impairments/Disabilities:      None    Nutrition Therapy:  Current Nutrition Therapy: ADULT DIET; Regular; 3 carb choices (45 gm/meal)  Routes of Feeding: Oral  Liquids: Thin Liquids  Daily Fluid Restriction: no  Last Modified Barium Swallow with Video (Video Swallowing Test): not done    Treatments at the Time of Hospital Discharge:   Respiratory Treatments:   Oxygen Therapy:  is not on home oxygen therapy.   Ventilator:    - No ventilator support    Lab orders for discharge:        Rehab Therapies: Physical Therapy, Occupational Therapy and nursing care  Weight Bearing Status/Restrictions: 50% WB left leg     NWB left arm  Other Medical Equipment (for information only, NOT a DME order): [address]  Other Treatments:.Keep left arm splint and ACE clean and dry. DO NOT remove. ,    Knee hi PANKAJ  hose on for 2 weeks during the day and off at night to prevent blood clots. Patient's personal belongings (please select all that are sent with patient):  Glasses    RN SIGNATURE:  Electronically signed by Tavares Schafer RN on 5/4/22 at 11:32 AM EDT    PHYSICIAN SECTION    Prognosis: Good    Condition at Discharge: Stable    Rehab Potential (if transferring to Rehab): Good    Physician Certification: I certify the above orders, information, and transfer of Amparo Alexander is necessary for the continuing treatment of the diagnosis listed and that she requires [place] for less 30 days. Update Admission H&P: No change in H&P    PHYSICIAN SIGNATURE:  Electronically signed by JOSE F Freedman CNP on 5/2/22 at 6:15 PM EDT/ Dr Alton Castro Status Date: 4/30/22    [facility] Readmission Risk Assessment Score:    Discharging to Facility/ Agency   Name: [facility]  Address:  [address] [place] [address], [place] [address]   Phone:  [phone]  Fax:  [phone]    / signature: Electronically signed by Davida Perez on 5/4/22 at 11:16 AM EDT    .      Followup Dr Meryle Christen in 2 weeks   [facility] [address] [facility], [address] [facility], [address] [place] [address]. [place] [address], [phone]

## 2022-05-02 NOTE — PROGRESS NOTES
Pt arouse on arrival to PACU. Denies pain at present. Pt feels a little light-headed. VSS. Placed call for stat h/h. Dr. Jolly Leyden to PACU. Noted frequent PVCs on tele.

## 2022-05-02 NOTE — PROGRESS NOTES
AAO4, resting with eyes closed and call light in reach. No falls noted this shift. Bed kept in low position. Safe environment maintained. Bedside table & call light in reach. Uses call light appropriately when needing assistance. Vitals signs are stable.   Intake and output are being recorded, will continue to monitor

## 2022-05-02 NOTE — PROGRESS NOTES
Pt resting. No results from bloodwork. Spoke to lab processing. Labs were not received until 1619. Requested for lab to run stat. Dr. Darylene Sneddon to see pt and stated that if the h/h is stable, pt can be transferred to her room. No increased in light-headedness. Pt denies pain.

## 2022-05-02 NOTE — BRIEF OP NOTE
Brief Postoperative Note      Patient: Isael Arevalo  YOB: 1929  MRN: [id number]    Date of Procedure: 5/2/2022    Pre-Op Diagnosis: left periprosthetic proximal femur fracture    Post-Op Diagnosis: Same       Procedure(s):  OPEN TREATMENT LEFT PERIPROSTHETIC FRMUR FRACTURE WITH INTERNAL FIXATION    Surgeon(s):  Kathleen Astudillo MD    Assistant:  Surgical Assistant: Nirmal John    Anesthesia: General    Estimated Blood Loss (mL): 034     Complications: None    Specimens:   * No specimens in log *    Implants:  Implant Name Type Inv. Item Serial No.  Lot No. LRB No. Used Action   CABLE SURG DIA1. 7MM S STL HA CERCLAGE W/ CRMP [[id number]] [[employer] [facility]] - [id number]  CABLE SURG DIA1. 7MM S STL HA CERCLAGE W/ CRMP [[id number]] [[employer]]  [employer] [place] [id number] Left 1 Implanted   CABLE SURG DIA1. 7MM S STL HA CERCLAGE W/ CRMP [[id number]] [[employer]] - [id number]  CABLE SURG DIA1. 7MM S STL HA CERCLAGE W/ CRMP [[id number]] [[employer]]  [employer] [id number] Left 1 Implanted   CABLE SURG DIA1. 7MM S STL HA CERCLAGE W/ CRMP [[id number]] [[employer]] - [id number]  CABLE SURG DIA1. 7MM S STL HA CERCLAGE W/ CRMP [[id number]] [[employer] [facility]]  [employer] [id number] Left 1 Implanted   GRAFT BNE SUB 1CC DEMIN BNE MTRX PTTY OSTEOINDUCTIVE INJ - [id number]  GRAFT BNE SUB 1CC DEMIN BNE MTRX PTTY OSTEOINDUCTIVE INJ [id number] [facility]  Left 1 Implanted         Drains:   Urinary Catheter (Active)   $ Urethral catheter insertion Inserted for procedure 05/01/22 1438   Catheter Indications Perioperative use for selected surgical procedures 05/01/22 2335   Site Assessment [place] 05/01/22 2335   Urine Color Yellow 05/02/22 0621   Urine Appearance Clear 05/02/22 0621   Urine Odor Other (Comment) 05/02/22 0621   Collection Container Standard 05/01/22 6103   Securement Method Securing device (Describe) 05/01/22 2335   Catheter Care Completed Yes 05/01/22 0916   Catheter Best Practices  Catheter secured to thigh;Lack of dependent loop in tubing 05/01/22 2335   Status Draining 05/01/22 2335   Manual Irrigation Volume Input (mL) 0 mL 05/01/22 2335   Output (mL) 250 mL 05/02/22 0621   Discontinuation Reason Per provider order 05/01/22 2335       Findings: stable stem    Electronically signed by Aidan Payan MD on 5/2/2022 at 3:06 PM

## 2022-05-02 NOTE — ANESTHESIA POSTPROCEDURE EVALUATION
Department of Anesthesiology  Postprocedure Note    Patient: Wing Spivey  MRN: [id number]  [place]: 2/12/1929  Date of evaluation: 5/2/2022  Time:  4:37 PM     Procedure Summary     Date: 05/02/22 Room / Location: [address] [place] [address]    Anesthesia Start: 2405 Anesthesia Stop: 2440    Procedure: OPEN TREATMENT LEFT PERIPROSTHETIC FEMUR FRACTURE WITH INTERNAL FIXATION (Left Hip) Diagnosis: (UNKNOWN)    Surgeons: Jaime Molina MD Responsible Provider: Claudia Meeks MD    Anesthesia Type: general ASA Status: 3          Anesthesia Type: general    Oriana Phase I:      Oriana Phase II:      Last vitals: Reviewed and per EMR flowsheets. Anesthesia Post Evaluation    Patient location during evaluation: PACU  Patient participation: complete - patient participated  Level of consciousness: awake and alert  Airway patency: patent  Nausea & Vomiting: no nausea and no vomiting  Complications: no  Cardiovascular status: hemodynamically stable, hypertensive and blood pressure returned to baseline  Respiratory status: nonlabored ventilation and nasal cannula  Hydration status: hypervolemic (Swelling / excess fluid noted most prominently in left upper extremity)  Comments: Ms. Xiomara Hood continues to rest comfortably in PACU. Awaiting results of labs drawn on arrival to PACU. Otherwise appropriate for return to floor.        Claudia Meeks MD

## 2022-05-02 NOTE — PROGRESS NOTES
Risks and benefits of surgery (ORIF +/- revision arthroplasty) were discussed at length with the patient and her family and an opportunity for questions was afforded. Possible complications of this surgery include, but are not limited to, dislocation, leg-length discrepancy, infection, stiffness, persistent pain, weakness, blood clots, component loosening and wear, periprosthetic fracture, nonunion and malunion, neurovascular injury, bleeding, heterotopic ossification, wound healing problems. They demonstrated a good understanding of the procedure, anticipated outcomes, possible complications, the post-operative restrictions and possible therapy required, and at this time voiced desire to proceed. An informed consent form was signed and they will proceed with surgery today.     Labs:  Recent Labs     05/02/22  0513 05/01/22  0610 04/30/22  0551   WBC 5.9 7.7 7.7   HGB 6.8* 8.2* 8.5*   HCT 20.5* 26.0* 26.1*   MCV 88.8 93.5 89.8   * 140 147     Lab Results   Component Value Date     05/02/2022    K 4.1 05/02/2022     05/02/2022    CO2 22 05/02/2022    BUN 22 (H) 05/02/2022    CREATININE 0.8 05/02/2022    GLUCOSE 87 05/02/2022    CALCIUM 7.9 (L) 05/02/2022    PROT 5.5 (L) 11/16/2021    LABALBU 3.2 (L) 11/16/2021    BILITOT <0.2 11/16/2021    ALKPHOS 64 11/16/2021    AST 12 (L) 11/16/2021    ALT 7 (L) 11/16/2021    LABGLOM >60 05/02/2022    GFRAA >60 05/02/2022    AGRATIO 1.4 11/16/2021    GLOB 3.1 08/10/2017     Lab Results   Component Value Date    INR 1.00 04/29/2022    INR 0.89 08/26/2016     Lab Results   Component Value Date    APTT 25.4 (L) 05/01/2022    APTT 26.6 08/26/2016     No results found for: LABA1C  Lab Results   Component Value Date    VITD25 6.0 (L) 05/02/2022        Receiving 2nd unit of PRBC preop    LLE SILT SP/DP/T/LFC nerve distributions; EHL/FHL/TA/GS intact  Left PT pulse intact, RRR    Philemon Felty, MD

## 2022-05-02 NOTE — ANESTHESIA PRE PROCEDURE
Department of Anesthesiology  Preprocedure Note       Name:  Ludin Rosales   Age:  80 y.o.  :  1929                                          MRN:  [id number]         Date:  2022      Surgeon: Lynda Yancey):  Alton Mariee MD    Procedure: Procedure(s):  OPEN TREATMENT LEFT PERIPROSTHETIC FRMUR FRACTURE WITH INTERNAL FIXATION ARTHROPLASTY    Medications prior to admission:   Prior to Admission medications    Medication Sig Start Date End Date Taking? Authorizing Provider   dorzolamide-timolol (COSOPT) 22.3-6.8 MG/ML ophthalmic solution Place 1 drop into both eyes 2 times daily    Historical Provider, MD   clobetasol (TEMOVATE) 0.05 % ointment Apply topically 2 times daily. 21   Rachel Lanier MD   LUMIGAN 0.01 % SOLN ophthalmic drops Place 1 drop into both eyes every evening 19   Historical Provider, MD   benazepril (LOTENSIN) 20 MG tablet Take 20 mg by mouth daily 19   Historical Provider, MD   amLODIPine (NORVASC) 5 MG tablet Take 1 tablet by mouth daily 16   Trevon Martinez MD   levothyroxine (SYNTHROID) 75 MCG tablet Take 75 mcg by mouth Daily    Historical Provider, MD   aspirin 81 MG tablet Take 81 mg by mouth daily. Historical Provider, MD   lovastatin (MEVACOR) 40 MG tablet Take 40 mg by mouth nightly. Historical Provider, MD   omeprazole (PRILOSEC) 20 MG capsule Take 20 mg by mouth daily.     Historical Provider, MD       Current medications:    Current Facility-Administered Medications   Medication Dose Route Frequency Provider Last Rate Last Admin    0.9 % sodium chloride infusion   IntraVENous PRN Vasquez Torres MD        ceFAZolin (ANCEF) 2000 mg in dextrose 5 % 100 mL IVPB  2,000 mg IntraVENous On Call to [id number] Jimmy Patel MD        vitamin D (CHOLECALCIFEROL) capsule 5,000 Units  5,000 Units Oral Daily Alton Mariee MD        0.9 % sodium chloride infusion   IntraVENous PRN Alton Mariee MD        oxyCODONE (ROXICODONE) immediate release tablet 5 mg  5 mg Oral Q4H PRN MD Hasmukh Pedroza oxyCODONE HCl (OXY-IR) immediate release tablet 10 mg  10 mg Oral Q4H PRN Jatin Liao MD   10 mg at 05/02/22 0542    0.9 % sodium chloride infusion   IntraVENous PRN Jatin Liao MD        pantoprazole (PROTONIX) tablet 40 mg  40 mg Oral QAM AC Duong Olea MD   40 mg at 05/02/22 0543    latanoprost (XALATAN) 0.005 % ophthalmic solution 1 drop  1 drop Both Eyes Nightly Duong Olea MD   1 drop at 05/01/22 2136    atorvastatin (LIPITOR) tablet 20 mg  20 mg Oral Nightly Duong Olea MD   20 mg at 05/01/22 2137    levothyroxine (SYNTHROID) tablet 75 mcg  75 mcg Oral Daily Duong Olea MD   75 mcg at 05/02/22 0542    dorzolamide-timolol (COSOPT) 22.3-6.8 MG/ML ophthalmic solution 1 drop  1 drop Both Eyes BID Duong Olea MD   1 drop at 05/02/22 1044    lisinopril (PRINIVIL;ZESTRIL) tablet 20 mg  20 mg Oral Daily Duong Olea MD   20 mg at 05/02/22 1017    [Held by provider] aspirin chewable tablet 81 mg  81 mg Oral Daily Duong Olea MD   81 mg at 05/01/22 0904    amLODIPine (NORVASC) tablet 5 mg  5 mg Oral Daily Duong Olea MD   5 mg at 05/02/22 1017    sodium chloride flush 0.9 % injection 10 mL  10 mL IntraVENous 2 times per day Duong Olea MD   10 mL at 05/02/22 1044    sodium chloride flush 0.9 % injection 10 mL  10 mL IntraVENous PRN Duong Olea MD        0.9 % sodium chloride infusion   IntraVENous PRN Duong Olea MD        ondansetron [facility]) injection 4 mg  4 mg IntraVENous Q4H PRN Duong Olea MD        polyethylene glycol [facility]) packet 17 g  17 g Oral Daily PRN Duong Olea MD        acetaminophen (TYLENOL) tablet 650 mg  650 mg Oral Q4H PRN Duong Olea MD   650 mg at 05/02/22 0542    Or    acetaminophen (TYLENOL) suppository 650 mg  650 mg Rectal Q4H PRN Duong Olea MD        0.9 % sodium chloride infusion   IntraVENous Continuous Duong Olea MD 75 mL/hr at 04/30/22 0308 New Bag at 04/30/22 0301    cefTRIAXone (ROCEPHIN) 1000 mg IVPB in 50 mL D5W minibag  1,000 mg IntraVENous Q24H Nely Villalba MD   Stopped at 05/01/22 1438    morphine (PF) injection 1 mg  1 mg IntraVENous Q4H PRN Shonna Washburn DO        [Held by provider] enoxaparin Sodium (LOVENOX) injection 30 mg  30 mg SubCUTAneous Daily Chris Vaughn MD   30 mg at 05/01/22 9775    tetanus-diphth-acell pertussis (BOOSTRIX) injection 0.5 mL  0.5 mL IntraMUSCular Once Nely Villalba MD           Allergies: Allergies   Allergen Reactions    Sulfa Antibiotics Nausea Only       Problem List:    Patient Active Problem List   Diagnosis Code    Urinary tract infection with hematuria N39.0, R31.9    Hyponatremia E87.1    Nausea & vomiting R11.2    Left displaced femoral neck fracture with a hemiarthroplasty left hip 8- S72.002A    Community acquired pneumonia J18.9    Acute respiratory failure with hypoxia ([address] J96.01    High anion gap metabolic acidosis A69.9    Hypoxia R09.02    Rhinitis J31.0    Pneumatocele of lung J98.4    Sciatica of left side M54.32    Low back pain M54.50    Scoliosis of lumbosacral region due to degenerative disease of spine in adult M41.87    History of left hip hemiarthroplasty V69.400    Debility R53.81    Priscilla-prosthetic fracture around prosthetic hip M97. 8XXA, [id number]    Acute cystitis with hematuria N30.01    Acquired hypothyroidism E03.9    Essential hypertension I10    Hyperlipidemia E78.5    Hypokalemia E87.6    Minimally displaced fracture of lateral condyle of left humerus, initial encounter for closed fracture S42.452A    Osteoporosis with current pathological fracture, initial encounter M80.00XA    Pre-operative examination Z01.818    Fall W19. XXXA    Elevated troponin R77.8    Mild protein-calorie malnutrition (HCC) E44.1    Anemia D64.9       Past Medical History:        Diagnosis Date    Acquired hypothyroidism 4/30/2022    Arthritis     Endometrial thickening on ultrasound     Glaucoma     Hyperlipidemia     Hypertension     Osteoporosis with current pathological fracture, initial encounter 4/30/2022    Pneumonia     Post-menopausal bleeding     TIA (transient ischemic attack)        Past Surgical History:        Procedure Laterality Date    BREAST SURGERY      left lumpectomy    HIP ARTHROPLASTY Left 08/26/2016    hemiarthroplasty left hip    THYROIDECTOMY, PARTIAL      VARICOSE VEIN SURGERY         Social History:    Social History     Tobacco Use    Smoking status: Never Smoker    Smokeless tobacco: Never Used   Substance Use Topics    Alcohol use: Yes     Comment: occ                                Counseling given: Not Answered      Vital Signs (Current):   Vitals:    05/02/22 0719 05/02/22 0736 05/02/22 1123 05/02/22 1133   BP: 137/73 (!) 150/71 (!) 154/87 (!) 164/81   Pulse: 61 75 86 87   Resp: 18 18 18 18   Temp: 98 °F (36.7 °C) 98.3 °F (36.8 °C) 99.4 °F (37.4 °C) 98.6 °F (37 °C)   TempSrc: Oral Oral Temporal Temporal   SpO2: 94% 94% 94% 93%   Weight:       Height:                                                  BP Readings from Last 3 Encounters:   05/02/22 (!) 164/81   11/24/21 (!) 141/76   11/16/21 122/68       NPO Status: Time of last liquid consumption: 2300                        Time of last solid consumption: 2300                        Date of last liquid consumption: 05/01/22                        Date of last solid food consumption: 05/01/22    BMI:   Wt Readings from Last 3 Encounters:   05/02/22 105 lb 6.1 oz (47.8 kg)   11/24/21 101 lb 6.6 oz (46 kg)   11/16/21 98 lb 1.7 oz (44.5 kg)     Body mass index is 20.58 kg/m².     CBC:   Lab Results   Component Value Date    WBC 5.9 05/02/2022    RBC 2.30 05/02/2022    HGB 6.8 05/02/2022    HCT 20.5 05/02/2022    MCV 88.8 05/02/2022    RDW 16.1 05/02/2022     05/02/2022       CMP:   Lab Results   Component Value Date     05/02/2022    K 4.1 05/02/2022    CL 108 05/02/2022    CO2 22 05/02/2022    BUN 22 05/02/2022    CREATININE 0.8 05/02/2022    GFRAA >60 05/02/2022    GFRAA >60 12/05/2010    AGRATIO 1.4 11/16/2021    LABGLOM >60 05/02/2022    GLUCOSE 87 05/02/2022    PROT 5.5 11/16/2021    CALCIUM 7.9 05/02/2022    BILITOT <0.2 11/16/2021    ALKPHOS 64 11/16/2021    AST 12 11/16/2021    ALT 7 11/16/2021       POC Tests: No results for input(s): POCGLU, POCNA, POCK, POCCL, POCBUN, POCHEMO, POCHCT in the last 72 hours. Coags:   Lab Results   Component Value Date    PROTIME 11.3 04/29/2022    INR 1.00 04/29/2022    APTT 25.4 05/01/2022       HCG (If Applicable): No results found for: PREGTESTUR, PREGSERUM, HCG, HCGQUANT     ABGs: No results found for: PHART, PO2ART, TNA7NHB, RXC3OPN, BEART, F4XBAWLH     Type & Screen (If Applicable):  No results found for: LABABO, LABRH    Drug/Infectious Status (If Applicable):  No results found for: HIV, HEPCAB    COVID-19 Screening (If Applicable): No results found for: COVID19        Anesthesia Evaluation   no history of anesthetic complications:   Airway: Mallampati: II  TM distance: >3 FB     Mouth opening: > = 3 FB Dental:      Comment: Fair dentition, denies loose teeth    Pulmonary:   (+) decreased breath sounds,      (-) COPD, asthma, sleep apnea, wheezes, rales and not a current smoker                           Cardiovascular:  Exercise tolerance: good (>4 METS),   (+) hypertension:, hyperlipidemia    (-) orthopnea,  HERMOSILLO, murmur, weak pulses and  friction rubCHF: BNP: 661.       Rhythm: regular  Rate: normal                 ROS comment: Echocardiogram completed this morning, unable to reach reading physician    Troponin 0.04, likely demand ischemia in setting of anemia     Neuro/Psych:   (+) TIA,    (-) seizures and CVA            ROS comment: Chronic back pain with sciatica    CT Head:  Mild atrophy and mild chronic microischemic disease scattered in the deep white matter which is more prominent with no acute abnormality seen. 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 Postop changes right globe     GI/Hepatic/Renal:   (+) GERD:,      (-) liver disease, no renal disease and no morbid obesity       Endo/Other:    (+) hypothyroidism, blood dyscrasia (s/p 2u pRBC): anemia:., .    (-) no electrolyte abnormalities                ROS comment: History of left hip hemiarthroplasty    XR:  Severe osteopenia limiting the exam. Moderate osteoarthritic changes medially in the knee which is unchanged with no acute bony abnormality. Ill-defined sclerotic areas throughout the diametaphyseal region of the distal femur which is more prominent and could be due to bone infarcts. Small suprapatellar effusion     Abdominal:         (-) obese Abdomen: soft. Vascular: Other Findings: Cardiology called with preliminary normal read on echocardiogram.  Awake, alert, pleasant. Motor and sensory grossly intact in bilateral feet. Anesthesia Plan      general     ASA 3     (NPO appropriate; denies active nausea / reflux. DNR suspended for perioperative period. Second unit of pRBC infusing.)  Induction: intravenous. MIPS: Postoperative opioids intended and Prophylactic antiemetics administered. Anesthetic plan and risks discussed with patient. Use of blood products discussed with patient whom. Plan discussed with CRNA. This pre-anesthesia assessment may be used as a history and physical.    DOS STAFF ADDENDUM:    Pt seen and examined, chart reviewed (including anesthesia, drug and allergy history). No interval changes to history and physical examination. Anesthetic plan, risks, benefits, alternatives, and personnel involved discussed with patient. Patient verbalized an understanding and agrees to proceed.       Juhi Villegas MD  May 2, 2022  11:43 AM

## 2022-05-02 NOTE — PROGRESS NOTES
Patient in bed following surgery. A&O X 4. She denies any dizziness or pain at this time. IV Clean, dry, intact and infusing without issue. Patient family at bedside. Dinner ordered and patient notified of dietary restrictions. Standard safety precautions in place. Will monitor.  Electronically signed by Maame Grimm RN on 5/2/2022 at 7:10 PM

## 2022-05-03 LAB
ANION GAP SERPL CALCULATED.3IONS-SCNC: 10 MMOL/L (ref 3–16)
BASOPHILS ABSOLUTE: 0 K/UL (ref 0–0.2)
BASOPHILS RELATIVE PERCENT: 0 %
BUN BLDV-MCNC: 20 MG/DL (ref 7–20)
CALCIUM SERPL-MCNC: 7.5 MG/DL (ref 8.3–10.6)
CHLORIDE BLD-SCNC: 106 MMOL/L (ref 99–110)
CO2: 20 MMOL/L (ref 21–32)
CREAT SERPL-MCNC: 0.6 MG/DL (ref 0.6–1.2)
EOSINOPHILS ABSOLUTE: 0 K/UL (ref 0–0.6)
EOSINOPHILS RELATIVE PERCENT: 0 %
GFR AFRICAN AMERICAN: >60
GFR NON-AFRICAN AMERICAN: >60
GLUCOSE BLD-MCNC: 138 MG/DL (ref 70–99)
HCT VFR BLD CALC: 27.6 % (ref 36–48)
HEMOGLOBIN: 9.2 G/DL (ref 12–16)
LYMPHOCYTES ABSOLUTE: 0.8 K/UL (ref 1–5.1)
LYMPHOCYTES RELATIVE PERCENT: 13.3 %
MCH RBC QN AUTO: 29 PG (ref 26–34)
MCHC RBC AUTO-ENTMCNC: 33.4 G/DL (ref 31–36)
MCV RBC AUTO: 86.8 FL (ref 80–100)
MONOCYTES ABSOLUTE: 0.5 K/UL (ref 0–1.3)
MONOCYTES RELATIVE PERCENT: 8 %
NEUTROPHILS ABSOLUTE: 4.6 K/UL (ref 1.7–7.7)
NEUTROPHILS RELATIVE PERCENT: 78.7 %
PDW BLD-RTO: 16.3 % (ref 12.4–15.4)
PLATELET # BLD: 135 K/UL (ref 135–450)
PMV BLD AUTO: 7.8 FL (ref 5–10.5)
POTASSIUM REFLEX MAGNESIUM: 4 MMOL/L (ref 3.5–5.1)
RBC # BLD: 3.18 M/UL (ref 4–5.2)
SODIUM BLD-SCNC: 136 MMOL/L (ref 136–145)
WBC # BLD: 5.8 K/UL (ref 4–11)

## 2022-05-03 PROCEDURE — U0003 INFECTIOUS AGENT DETECTION BY NUCLEIC ACID (DNA OR RNA); SEVERE ACUTE RESPIRATORY SYNDROME CORONAVIRUS 2 (SARS-COV-2) (CORONAVIRUS DISEASE [COVID-19]), AMPLIFIED PROBE TECHNIQUE, MAKING USE OF HIGH THROUGHPUT TECHNOLOGIES AS DESCRIBED BY CMS-2020-01-R: HCPCS

## 2022-05-03 PROCEDURE — 97530 THERAPEUTIC ACTIVITIES: CPT

## 2022-05-03 PROCEDURE — 6360000002 HC RX W HCPCS: Performed by: ORTHOPAEDIC SURGERY

## 2022-05-03 PROCEDURE — 2580000003 HC RX 258: Performed by: ORTHOPAEDIC SURGERY

## 2022-05-03 PROCEDURE — 80048 BASIC METABOLIC PNL TOTAL CA: CPT

## 2022-05-03 PROCEDURE — 36415 COLL VENOUS BLD VENIPUNCTURE: CPT

## 2022-05-03 PROCEDURE — 6370000000 HC RX 637 (ALT 250 FOR IP): Performed by: NURSE PRACTITIONER

## 2022-05-03 PROCEDURE — 97162 PT EVAL MOD COMPLEX 30 MIN: CPT

## 2022-05-03 PROCEDURE — 6370000000 HC RX 637 (ALT 250 FOR IP): Performed by: ORTHOPAEDIC SURGERY

## 2022-05-03 PROCEDURE — U0005 INFEC AGEN DETEC AMPLI PROBE: HCPCS

## 2022-05-03 PROCEDURE — 94760 N-INVAS EAR/PLS OXIMETRY 1: CPT

## 2022-05-03 PROCEDURE — 97535 SELF CARE MNGMENT TRAINING: CPT

## 2022-05-03 PROCEDURE — 1200000000 HC SEMI PRIVATE

## 2022-05-03 PROCEDURE — 87635 SARS-COV-2 COVID-19 AMP PRB: CPT

## 2022-05-03 PROCEDURE — 97166 OT EVAL MOD COMPLEX 45 MIN: CPT

## 2022-05-03 PROCEDURE — 85025 COMPLETE CBC W/AUTO DIFF WBC: CPT

## 2022-05-03 RX ORDER — ACETAMINOPHEN 325 MG/1
650 TABLET ORAL EVERY 8 HOURS SCHEDULED
Status: DISCONTINUED | OUTPATIENT
Start: 2022-05-03 | End: 2022-05-04 | Stop reason: HOSPADM

## 2022-05-03 RX ADMIN — TRAMADOL HYDROCHLORIDE 50 MG: 50 TABLET, COATED ORAL at 09:33

## 2022-05-03 RX ADMIN — AMLODIPINE BESYLATE 5 MG: 5 TABLET ORAL at 08:33

## 2022-05-03 RX ADMIN — PANTOPRAZOLE SODIUM 40 MG: 40 TABLET, DELAYED RELEASE ORAL at 05:54

## 2022-05-03 RX ADMIN — SODIUM CHLORIDE 25 ML: 9 INJECTION, SOLUTION INTRAVENOUS at 13:50

## 2022-05-03 RX ADMIN — DORZOLAMIDE HYDROCHLORIDE AND TIMOLOL MALEATE 1 DROP: 20; 5 SOLUTION/ DROPS OPHTHALMIC at 20:41

## 2022-05-03 RX ADMIN — ATORVASTATIN CALCIUM 20 MG: 20 TABLET, FILM COATED ORAL at 21:01

## 2022-05-03 RX ADMIN — ACETAMINOPHEN 650 MG: 325 TABLET ORAL at 20:41

## 2022-05-03 RX ADMIN — LATANOPROST 1 DROP: 50 SOLUTION OPHTHALMIC at 20:41

## 2022-05-03 RX ADMIN — LEVOTHYROXINE SODIUM 75 MCG: 0.07 TABLET ORAL at 05:54

## 2022-05-03 RX ADMIN — ACETAMINOPHEN 650 MG: 325 TABLET ORAL at 03:50

## 2022-05-03 RX ADMIN — SODIUM CHLORIDE, PRESERVATIVE FREE 10 ML: 5 INJECTION INTRAVENOUS at 20:58

## 2022-05-03 RX ADMIN — DORZOLAMIDE HYDROCHLORIDE AND TIMOLOL MALEATE 1 DROP: 20; 5 SOLUTION/ DROPS OPHTHALMIC at 08:33

## 2022-05-03 RX ADMIN — ACETAMINOPHEN 650 MG: 325 TABLET ORAL at 13:41

## 2022-05-03 RX ADMIN — LISINOPRIL 20 MG: 20 TABLET ORAL at 08:33

## 2022-05-03 RX ADMIN — Medication 5000 UNITS: at 08:33

## 2022-05-03 RX ADMIN — ENOXAPARIN SODIUM 30 MG: 100 INJECTION SUBCUTANEOUS at 08:33

## 2022-05-03 RX ADMIN — CEFTRIAXONE 1000 MG: 1 INJECTION, POWDER, FOR SOLUTION INTRAMUSCULAR; INTRAVENOUS at 13:51

## 2022-05-03 ASSESSMENT — PAIN DESCRIPTION - ONSET
ONSET: ON-GOING

## 2022-05-03 ASSESSMENT — PAIN DESCRIPTION - ORIENTATION
ORIENTATION: LEFT

## 2022-05-03 ASSESSMENT — PAIN DESCRIPTION - LOCATION
LOCATION: ARM;LEG
LOCATION: ELBOW;HIP
LOCATION: ARM;HIP

## 2022-05-03 ASSESSMENT — PAIN SCALES - GENERAL
PAINLEVEL_OUTOF10: 6
PAINLEVEL_OUTOF10: 4
PAINLEVEL_OUTOF10: 4
PAINLEVEL_OUTOF10: 0
PAINLEVEL_OUTOF10: 5
PAINLEVEL_OUTOF10: 5
PAINLEVEL_OUTOF10: 4

## 2022-05-03 ASSESSMENT — PAIN DESCRIPTION - FREQUENCY
FREQUENCY: CONTINUOUS

## 2022-05-03 ASSESSMENT — PAIN DESCRIPTION - DESCRIPTORS
DESCRIPTORS: ACHING;DISCOMFORT
DESCRIPTORS: ACHING

## 2022-05-03 ASSESSMENT — PAIN DESCRIPTION - PAIN TYPE
TYPE: ACUTE PAIN;SURGICAL PAIN
TYPE: ACUTE PAIN;SURGICAL PAIN
TYPE: SURGICAL PAIN;ACUTE PAIN
TYPE: ACUTE PAIN;SURGICAL PAIN
TYPE: SURGICAL PAIN
TYPE: ACUTE PAIN;SURGICAL PAIN

## 2022-05-03 ASSESSMENT — PAIN SCALES - WONG BAKER
WONGBAKER_NUMERICALRESPONSE: 2
WONGBAKER_NUMERICALRESPONSE: 0

## 2022-05-03 NOTE — PROGRESS NOTES
Physical Therapy  Facility/Department: [address] ORTHOPEDICS  Physical Therapy Initial Assessment  This note serves as patient discharge summary if pt discharges prior to next PT visit    Name: Isael Arevalo  : 1929  MRN: [id number]  Date of Service: 5/3/2022    Discharge Recommendations:  24 hour supervision or assist,Therapy recommended at discharge (3-5x /week)   PT Equipment Recommendations  Equipment Needed: No   Isael Arevalo scored a 6/24 on the AM-PAC short mobility form. Current research shows that an AM-PAC score of 17 or less is typically not associated with a discharge to the patient's home setting. Based on the patient's AM-PAC score and their current functional mobility deficits, it is recommended that the patient have 3-5 sessions per week of Physical Therapy at d/c to increase the patient's independence. Please see assessment section for further patient specific details. Patient Diagnosis(es): The primary encounter diagnosis was Fall, initial encounter. Diagnoses of Urinary tract infection without hematuria, site unspecified, Periprosthetic fracture of hip, initial encounter, Other closed displaced fracture of proximal end of left humerus, initial encounter, and Hypokalemia were also pertinent to this visit. Past Medical History:  has a past medical history of Acquired hypothyroidism, Arthritis, Endometrial thickening on ultrasound, Glaucoma, Hyperlipidemia, Hypertension, Osteoporosis with current pathological fracture, initial encounter, Pneumonia, Post-menopausal bleeding, and TIA (transient ischemic attack). Past Surgical History:  has a past surgical history that includes Thyroidectomy, partial; Varicose vein surgery; Breast surgery; and Hip Arthroplasty (Left, 2016). Assessment   Body Structures, Functions, Activity Limitations Requiring Skilled Therapeutic Intervention: Decreased functional mobility ; Decreased ADL status; Decreased ROM; Decreased strength;Decreased endurance;Decreased balance; Increased pain;Decreased posture  Assessment: Pt is a 81 yo. female who presents to hospital following a fall on 4/29/22 where she obtained left periprosthetic hip fracture, a displaced left lateral epicondyle fracture and a laceration to her right shin, as well as L elbow frx. ORIF repair of L hip completed on 5/02/22 by Dr. Lillian Koroma. Additional PMHx includes HTN, HLD, Hypothyroidism, Previous TIA, and L displaced femoral neck frx with hemiarthroplasty of L hip in 2016. PTA pt reports being independent with ADLs like bathing, dressing and light cooking; pt's family helps with laundry, cleaning and buying groceries. Status 5/03/22: Pt was dependent for bed mobility to move to sitting EOB, requiring Total Ax2. Pt initially Max A for sitting balance but progressed to Min A and brief CGA towards end of session. Attempted standing to STEDY with pt, pt unable due to high pain. Pt maxi-moved to BS chair. Recommending pt receive ongoing therapy following discharge 3-5x/week at skilled facility.   Therapy Prognosis: Fair  Decision Making: Medium Complexity  Clinical Presentation: Stable  Requires PT Follow-Up: Yes  Activity Tolerance  Activity Tolerance: Patient tolerated evaluation without incident;Patient limited by pain  Activity Tolerance Comments: High levels of pain and fear     Plan   Plan  Plan: 3-5 times per week  Current Treatment Recommendations: Strengthening,ROM,Balance training,Functional mobility training,Transfer training,Safety education & training  Safety Devices  Type of Devices: Patient at risk for falls,Nurse notified,Call light within reach,Chair alarm in place,Left in chair,Gait belt     Restrictions  Restrictions/Precautions  Restrictions/Precautions: Weight Bearing,Fall Risk  Lower Extremity Weight Bearing Restrictions  Left Lower Extremity Weight Bearing: Partial Weight Bearing  Partial Weight Bearing Percentage Or Pounds: 50%  Upper Extremity Weight Bearing Restrictions  Left Upper Extremity Weight Bearing: Non Weight Bearing (splint)     Subjective   General  Chart Reviewed: Yes  Patient assessed for rehabilitation services?: Yes  Additional Pertinent Hx: Pt is a 81 yo. female who presents to hospital following a fall on 4/29/22 where she obtained left periprosthetic hip fracture, a displaced left lateral epicondyle fracture and a laceration to her right shin, as well as L elbow frx. ORIF repair made on L hip on 5/2/22 by Dr. Tyesha Li. Additional PMHx includes HTN, HLD, Hypothyroidism, Previous TIA, and L displaced femoral neck frx with hemiarthroplasty of L hip in 2016. Response To Previous Treatment: Not applicable  Family / Caregiver Present: Yes (Dtr)  Referring Practitioner: Nguyen Ann MD  Referral Date : 05/02/22  Subjective  Subjective: Pt reports being in 10/10 pain during movement of both L UE and LE. Social/Functional History  Social/Functional History  Lives With: Alone  Type of Home: House  Home Layout: Two level  Home Access: Stairs to enter without rails (Holds onto door handle)  Entrance Stairs - Number of Steps: 1  Bathroom Shower/Tub: Tub/Shower unit,Walk-in shower (both)  Bathroom Equipment: Shower chair,Grab bars in shower (bathrooms both on 2nd floor)  Bathroom Accessibility: Walker accessible  Home Equipment: Walker, rolling,Cane,Long-handled shoehorn,Alert Button,Walker, 4 wheeled (transport chair)  Has the patient had two or more falls in the past year or any fall with injury in the past year?: No (Pt reports no other falls than the one that caused current episode of care)  Receives Help From: Family  ADL Assistance: Independent  Homemaking Assistance: Needs assistance (family gets groceries, does laundry, cleans. pt does light cooking)  Ambulation Assistance: Independent (RW in home.  w/c in community (family pushes))  Transfer Assistance: Independent  Active : No  Leisure & Hobbies: children (6)     Vision/Hearing  Vision Exceptions: Wears glasses for reading (glaucoma)  Hearing: Within functional limits      Cognition   Orientation  Overall Orientation Status: Within Functional Limits  Cognition  Overall Cognitive Status: Exceptions  Following Commands: Follows one step commands with increased time; Follows one step commands with repetition  Attention Span: Attends with cues to redirect  Safety Judgement: Decreased awareness of need for assistance  Insights: Decreased awareness of deficits  Initiation: Requires cues for some  Sequencing: Requires cues for some     Objective   AROM RLE (degrees)  RLE AROM: WFL  AROM RUE (degrees)  RUE AROM : WFL  Strength Other  Other: MMTs not assessed due to high levels of pain. Will assess at later session. Balance  Sitting:  (seated EOB with BLE on floor x25 min. Max to [address] A d/t R lean (expected d/t L hip pain). Brief periods of CGA.  Corrects lean with verbal cues briefly)  Bed mobility  Supine to Sit: Dependent/Total;2 Person assistance (HOB elevated some)  Scooting: Maximal assistance (to EOB)  Transfers  Sit to Stand:  (Attempted at [facility], pt reported too high of pain to complete.)  Bed to Chair: Dependent/Total (MAXI MOVE)   Balance  Posture: Poor  Sitting - Static: Fair (Pt sits with exaggerated R lean of trunk and head)  Sitting - Dynamic: Fair     AM-PAC Score  AM-PAC Inpatient Mobility Raw Score : 6 (05/03/22 1026)  AM-PAC Inpatient T-Scale Score : 23.55 (05/03/22 1026)  Mobility Inpatient CMS 0-100% Score: 100 (05/03/22 1026)  Mobility Inpatient CMS G-Code Modifier : CN (05/03/22 1026)      Goals  Short Term Goals  Time Frame for Short term goals: 1-3 days  Short term goal 1: Pt will complete bed mobility with Min Ax2  Short term goal 2: Pt will stand to STEDY with Min-Mod Ax2  Short term goal 3: Pt will demonstrate verbal understanding of WB precautions of both L UE and LE  Patient Goals   Patient goals : Decrease pain, go home     Therapy Time   Individual Concurrent Group Co-treatment   Time In 0900         Time Out 1010         Minutes 79             Eval 15, TA [address] [facility], Student Physical Therapist  I attest that I was present for and made a skilled & mindful clinical judgement during the evaluation and/or treatment of this patient on 5/3/2022  Electronically signed by Cal Saeed, [address] [place] [address] Drive [phone])  on 5/3/2022 at 1:16 PM

## 2022-05-03 NOTE — PROGRESS NOTES
Occupational Therapy  Facility/Department: [address] ORTHOPEDICS  Occupational Therapy Initial Assessment    Name: Asael Ricks  : 1929  MRN: [id number]  Date of Service: 5/3/2022    Discharge Recommendations:  3-5 sessions per week,Patient would benefit from continued therapy after discharge  OT Equipment Recommendations  Equipment Needed: No     Asael Ricks scored a  on the AM-PAC ADL Inpatient form. Current research shows that an AM-PAC score of 17 or less is typically not associated with a discharge to the patient's home setting. Based on the patient's AM-PAC score and their current ADL deficits, it is recommended that the patient have 3-5 sessions per week of Occupational Therapy at d/c to increase the patient's independence. Please see assessment section for further patient specific details. If patient discharges prior to next session this note will serve as a discharge summary. Please see below for the latest assessment towards goals. Patient Diagnosis(es): The primary encounter diagnosis was Fall, initial encounter. Diagnoses of Urinary tract infection without hematuria, site unspecified, Periprosthetic fracture of hip, initial encounter, Other closed displaced fracture of proximal end of left humerus, initial encounter, and Hypokalemia were also pertinent to this visit. Past Medical History:  has a past medical history of Acquired hypothyroidism, Arthritis, Endometrial thickening on ultrasound, Glaucoma, Hyperlipidemia, Hypertension, Osteoporosis with current pathological fracture, initial encounter, Pneumonia, Post-menopausal bleeding, and TIA (transient ischemic attack). Past Surgical History:  has a past surgical history that includes Thyroidectomy, partial; Varicose vein surgery; Breast surgery; and Hip Arthroplasty (Left, 2016). Treatment Diagnosis: impaired ADL/fxl mobility    Assessment   Performance deficits / Impairments: Decreased functional mobility ; Decreased safe awareness;Decreased balance;Decreased ADL status; Decreased posture;Decreased ROM; Decreased endurance;Decreased high-level IADLs;Decreased strength;Decreased fine motor control  Assessment: 79 yo female admitted 4/30 for a fall with UTI resulting in LLE periprosthetic femur fracture and lateral condyle of left humerus (NWB). 5/2 s/p LLE femur ORIF (50% WB). PMH: hemiarthroplasty left hip (Aug 2016), HTN, Glaucoma, TIA. PTA, pt lives alone with assist for IADL, otherwise, IND ADL and fxl mobiltiy in [facility]. Today, pt functioning below baseline most limited by LUE NWB, LLE 50% WB, expected pain, and decreased strength. Pt required Total A x2 bed mobility, Total A LB dressing and toileting, and set up supported seated grooming. Pt dependent EOB>recliner wtih son d/t inability to complete sit>stand within stedy from elevated EOB with A x2. Pt tolerates ~25 min sitting EOB varying from Max to Min A with R lean. LUE digit swelling, RUE WFL. COnt acute OT to address above deficits. Pt is a high fall risk with low AMPAC score not associated with safe return home.  Rec slow pace of OT 3-5x/week until weight bearing progresses  Treatment Diagnosis: impaired ADL/fxl mobility  Prognosis: Fair  Decision Making: Medium Complexity  REQUIRES OT FOLLOW-UP: Yes  Activity Tolerance  Activity Tolerance: Patient limited by fatigue;Patient limited by pain  Activity Tolerance: expected LLE hip and elbow pain        Plan   Plan  Times per Week: 3-5  Current Treatment Recommendations: Strengthening,ROM,Balance training,Functional mobility training,Endurance training,Pain management,Safety education & training,Patient/Caregiver education & training,Modalities,Positioning,Self-Care / ADL     Restrictions  Restrictions/Precautions  Restrictions/Precautions: Weight Bearing,Fall Risk  Lower Extremity Weight Bearing Restrictions  Left Lower Extremity Weight Bearing: Partial Weight Bearing  Partial Weight Bearing Percentage Or Pounds: 50%  Upper Extremity Weight Bearing Restrictions  Left Upper Extremity Weight Bearing: Non Weight Bearing (splint)    Subjective   General  Chart Reviewed: Yes  Patient assessed for rehabilitation services?: Yes  Additional Pertinent Hx: 81 yo female admitted 4/30 for a fall with UTI resulting in LLE periprosthetic femur fracture and lateral condyle of left humerus (NWB). 5/2 s/p LLE femur ORIF. PMH: hemiarthroplasty left hip (Aug 2016), HTN, Glaucoma, TIA  Family / Caregiver Present: Yes (dtr)  Referring Practitioner: Dulce Maria Mccullough MD  Diagnosis: Fall  Subjective  Subjective: Pt resting in bed upon arrival and agreeable to OT/PT eval. Pt reporting mild pain in L elbow at rest, significant in L elbow and hip with bed mobility (RN present and administers medication)  General Comment  Comments: RN ok to see     Social/Functional History  Social/Functional History  Lives With: Alone  Type of Home: House  Home Layout: Two level  Home Access: Stairs to enter without rails (Holds onto door handle)  Entrance Stairs - Number of Steps: 1  Bathroom Shower/Tub: Tub/Shower unit,Walk-in shower (both)  Bathroom Equipment: Shower chair,Grab bars in shower (bathrooms both on 2nd floor)  Bathroom Accessibility: Walker accessible  Home Equipment: Donnamae Trevon, rolling,Cane,Long-handled [facility], 4 wheeled (transport chair)  Has the patient had two or more falls in the past year or any fall with injury in the past year?: No (Pt reports no other falls than the one that caused current episode of care)  Receives Help From: Family  ADL Assistance: Independent  Homemaking Assistance: Needs assistance (family gets groceries, does laundry, cleans. pt does light cooking)  Ambulation Assistance: Independent (RW in home.  w/c in community (family pushes))  Transfer Assistance: Independent  Active : No  Leisure & Hobbies: children (6)       Objective   Vision Exceptions: Wears glasses for reading (Glaucoma)  Hearing: Within functional limits       Observation/Palpation  Observation: LUE hard splint with ace wrap (significant to moderate digits swelling). LLE lateral hip dressing (dry)  Safety Devices  Type of Devices: Patient at risk for falls;Nurse notified;Call light within reach; Chair alarm in place; Left in chair;Gait belt  Balance  Sitting:  (seated EOB with BLE on floor x25 min. Max to [employer] A d/t R lean (expected d/t L hip pain). Brief periods of CGA. Corrects lean with verbal cues briefly)  Gait  Overall Level of Assistance: Total assistance (EOB>recliner with use of lift equipment (son) d/t inability to complete tx)     AROM:  (see above)  Strength: Generally decreased, functional  Coordination: Within functional limits (LUE swelling however, WFL coordination)  Tone: Normal  ADL  Grooming: Setup (seated face washing)  LE Dressing: Dependent/Total (donning socks)  Toileting: Dependent/Total (eddy)     Activity Tolerance  Activity Tolerance: Patient tolerated evaluation without incident;Patient limited by pain  Activity Tolerance Comments: High levels of pain and fear  Bed mobility  Supine to Sit: Dependent/Total;2 Person assistance (HOB elevated some)  Scooting: Maximal assistance (to EOB)  Transfers  Transfer Comments: attempted sit>stand within stedy from elevated EOB with assist x2 and 2nd person assessing LLE 50% WB- unsuccessful (pt pain very high, fearful, and decreased strength)     Cognition  Overall Cognitive Status: Exceptions  Following Commands: Follows one step commands with increased time; Follows one step commands with repetition  Attention Span: Attends with cues to redirect  Safety Judgement: Decreased awareness of need for assistance  Insights: Decreased awareness of deficits  Initiation: Requires cues for some  Sequencing: Requires cues for some                  Education Given To: Patient; Family  Education Provided: Role of Therapy;Plan of Care;Transfer Training;Precautions  Education Provided Comments: MARK and LLE WB restrictions. importance of LUE digit AROM throughout day. Education Method: Verbal;Demonstration  Education Outcome: Verbalized understanding  LUE AROM (degrees)  LUE General AROM: digits WFL. wrist/elbow/forearm in hard splint. shoulder limited d/t heavy splint/pain  RUE AROM (degrees)  RUE AROM : WFL                AM-PAC Score        AM-PAC Inpatient Daily Activity Raw Score: 11 (05/03/22 1027)  AM-PAC Inpatient ADL T-Scale Score : 29.04 (05/03/22 1027)  ADL Inpatient CMS 0-100% Score: 70.42 (05/03/22 1027)  ADL Inpatient CMS G-Code Modifier : CL (05/03/22 1027)    Goals  Short Term Goals  Time Frame for Short term goals: prior to d/c  Short Term Goal 1: bed mobility Mod A x2  Short Term Goal 2: tolerate 5 min static sitting with UE support with CGA in midline  Short Term Goal 3: tolerate 3 min UB ADL seated with Min A  Short Term Goal 4: sit><stand Mod/Max A x2  Short Term Goal 5: verbalize LUE and LLE percautions  Patient Goals   Patient goals : improve pain       Therapy Time   Individual Concurrent Group Co-treatment   Time In 0900         Time Out 1010         Minutes 70         Timed Code Treatment Minutes: 55 Minutes (15 eval. 40 TA.  15 ADL)       ALANNA Sanchez, OTR/L

## 2022-05-03 NOTE — PLAN OF CARE
Problem: Discharge Planning  Goal: Discharge to home or other facility with appropriate resources  5/2/2022 2318 by Karen Vásquez RN  Note: Patient will be discharged with appropriate resources   5/2/2022 1907 by Tavo Ludwig RN  Flowsheets (Taken 5/2/2022 1907)  Discharge to home or other facility with appropriate resources:   Identify barriers to discharge with patient and caregiver   Arrange for needed discharge resources and transportation as appropriate   Identify discharge learning needs (meds, wound care, etc)  Note: Patient barriers to discharge will be established after assessment from interdisciplinary team.      Problem: Safety - Adult  Goal: Free from fall injury  Note: Patient will be free from falls     Problem: ABCDS Injury Assessment  Goal: Absence of physical injury  Note: Patient will be absent of physical injury     Problem: Skin/Tissue Integrity  Goal: Absence of new skin breakdown  Description: 1. Monitor for areas of redness and/or skin breakdown  2. Assess vascular access sites hourly  3. Every 4-6 hours minimum:  Change oxygen saturation probe site  4. Every 4-6 hours:  If on nasal continuous positive airway pressure, respiratory therapy assess nares and determine need for appliance change or resting period. 5/2/2022 2318 by Karen Vásquez RN  Note: Patient will be absent of new skin breakdown  5/2/2022 1907 by Tavo Ludwig RN  Note: Patient skin condition and mucus membrane integrity remain unchanged during this shift. Skin breakdown prevention interventions are in place. Will continue to monitor and assess.          Problem: Pain  Goal: Verbalizes/displays adequate comfort level or baseline comfort level  5/2/2022 2318 by Karen Vásquez RN  Note: Patients comfort will be at baseline  5/2/2022 1907 by Tavo Ludwig RN  Flowsheets (Taken 5/2/2022 1907)  Verbalizes/displays adequate comfort level or baseline comfort level:   Encourage patient to monitor pain and request assistance   Assess pain using appropriate pain scale   Administer analgesics based on type and severity of pain and evaluate response  Note: Pain managed with pharmacologic and non-pharmacologic interventions during this shift. Will continue to monitor and assess for needs and change in patient condition.

## 2022-05-03 NOTE — PROGRESS NOTES
Patient resting in bed upon assessment, A/Ox4 and vital signs stable, on 2L post op, complaining of pain 4 out of 10 and requesting Tylenol, medication given. IV fluids infusing, eddy draining clear, yellow urine, dressing to left hip clean, dry and intact and new ice pack applied. DVT boots on, neuro checks WDL, bed alarm on and all needs met at this time.

## 2022-05-03 NOTE — CARE COORDINATION
Per bedside RN pt has small skin tear to R shin. Skin approximated with foam dressing. No wound care needs at this time. Will not continue to follow. Please re-consult if needed.   Electronically signed by Erlinda Perry RN CWOCN on 5/3/2022 at 11:48 AM

## 2022-05-03 NOTE — PROGRESS NOTES
Pt A&O in the bed. Pt tolerating PO intake well. AM medications administered without complications. Pt has complaints of pain with movement. Declines any intervention at this time. Ice bags in place with pillow support. Dressing to left hip is clean, dry and intact. No signs of infection noted. Splint and ACE wrap to left arm is clean, dry and intact. Hand continues to be swollen. Pulse is palpable and hand is free of numbness or tingling. Call light within reach. Able to make needs known. Fall preucations in place. Will monitor.  Electronically signed by Tj Sahu RN on 5/3/2022 at 11:09 AM

## 2022-05-03 NOTE — PROGRESS NOTES
Patient continues to rest in bed this shift, now on room air, vital signs remain stable. Requesting Tylenol for pain, medication given. All needs met at this time.

## 2022-05-03 NOTE — PROGRESS NOTES
[facility] Orthopedic Surgery   Progress Note      S/P :  SUBJECTIVE  Alert and oriented. Family at bedside. . Pain is   described in left hip and left elbow and with the intensity of moderate at rest and severe with activity. Pain is described as aching, shooting. OBJECTIVE              Physical                      VITALS:  BP (!) 148/78   Pulse 73   Temp 98.3 °F (36.8 °C) (Oral)   Resp 15   Ht 5' (1.524 m)   Wt 107 lb 12.9 oz (48.9 kg)   SpO2 96%   BMI 21.05 kg/m²                     MUSCULOSKELETAL:  left foot NVI. Wiggles toes to command. Able to plantarflex and dorsiflex ankle Pedal pulses are palpable. Left wrist with intact flex/ext and hand grasp and extension and thumbs up Left hand with notable swelling. NEUROLOGIC:                                  Sensory:  Touch:  Left Lower and UpperExtremity:  normal                                                 Surgical wound appears clean and dry left hip with gauze and tape dressing. Left elbow in posterior splint with ACE.      Data       CBC:   Lab Results   Component Value Date    WBC 5.8 05/03/2022    RBC 3.18 05/03/2022    HGB 9.2 05/03/2022    HCT 27.6 05/03/2022    MCV 86.8 05/03/2022    MCH 29.0 05/03/2022    MCHC 33.4 05/03/2022    RDW 16.3 05/03/2022     05/03/2022    MPV 7.8 05/03/2022        WBC:    Lab Results   Component Value Date    WBC 5.8 05/03/2022        Hemoglobin/Hematocrit:    Lab Results   Component Value Date    HGB 9.2 05/03/2022    HCT 27.6 05/03/2022        PT/INR:    Lab Results   Component Value Date    PROTIME 11.3 04/29/2022    INR 1.00 04/29/2022              Current Inpatient Medications             Current Facility-Administered Medications: vitamin D (CHOLECALCIFEROL) capsule 5,000 Units, 5,000 Units, Oral, Daily  0.9 % sodium chloride infusion, , IntraVENous, PRN  [START ON 5/4/2022] aspirin chewable tablet 81 mg, 81 mg, Oral, Daily  enoxaparin Sodium (LOVENOX) injection 30 mg, 30 mg, SubCUTAneous, Daily  traMADol (ULTRAM) tablet 50 mg, 50 mg, Oral, Q6H PRN  0.9 % sodium chloride infusion, , IntraVENous, PRN  pantoprazole (PROTONIX) tablet 40 mg, 40 mg, Oral, QAM AC  latanoprost (XALATAN) 0.005 % ophthalmic solution 1 drop, 1 drop, Both Eyes, Nightly  atorvastatin (LIPITOR) tablet 20 mg, 20 mg, Oral, Nightly  levothyroxine (SYNTHROID) tablet 75 mcg, 75 mcg, Oral, Daily  dorzolamide-timolol (COSOPT) 22.3-6.8 MG/ML ophthalmic solution 1 drop, 1 drop, Both Eyes, BID  lisinopril (PRINIVIL;ZESTRIL) tablet 20 mg, 20 mg, Oral, Daily  amLODIPine (NORVASC) tablet 5 mg, 5 mg, Oral, Daily  sodium chloride flush 0.9 % injection 10 mL, 10 mL, IntraVENous, 2 times per day  sodium chloride flush 0.9 % injection 10 mL, 10 mL, IntraVENous, PRN  0.9 % sodium chloride infusion, , IntraVENous, PRN  ondansetron (ZOFRAN) injection 4 mg, 4 mg, IntraVENous, Q4H PRN  polyethylene glycol (GLYCOLAX) packet 17 g, 17 g, Oral, Daily PRN  acetaminophen (TYLENOL) tablet 650 mg, 650 mg, Oral, Q4H PRN **OR** acetaminophen (TYLENOL) suppository 650 mg, 650 mg, Rectal, Q4H PRN  cefTRIAXone (ROCEPHIN) 1000 mg IVPB in 50 mL D5W minibag, 1,000 mg, IntraVENous, Q24H  morphine (PF) injection 1 mg, 1 mg, IntraVENous, Q4H PRN  tetanus-diphth-acell pertussis (BOOSTRIX) injection 0.5 mL, 0.5 mL, IntraMUSCular, Once    ASSESSMENT AND PLAN      Fall at home  Anemia, post transfusion with improvement and stable. Will observe  Left proximal femur periprosthetic fracture. Prior hemiarthroplasty. Post ORIF with plate and cables yesterday per Dr Tyesha Li, stable exam  Left elbow condylar fx, conservative treatment in splint, NWB  PT OT following  Lovenox as inpt then switch to Eliquis 2.5mg PO BID x 30 days for DVT prophylaxis  Pain control with po meds. Add tylenol ATC as well. SS for DC planning  Remove eddy, use purewick. Change left hip dressing tomorrow or if DC today then just prior to DC using Mepilex AG. Candy Rea RN notified.      Radha Quintero JOSE F Keys - CNP  5/3/2022  11:33 AM     SY addendum - changed discharge DVT prophylaxis regimen recs

## 2022-05-03 NOTE — PROGRESS NOTES
Hospitalist Progress Note      PCP: Em Hinton MD    Date of Admission: 4/29/2022    Subjective: pain uncontrolled this am, better now    Medications:  Reviewed    Infusion Medications    sodium chloride      sodium chloride      sodium chloride 25 mL (05/03/22 1350)     Scheduled Medications    acetaminophen  650 mg Oral 3 times per day    vitamin D  5,000 Units Oral Daily    [START ON 5/4/2022] aspirin  81 mg Oral Daily    enoxaparin Sodium  30 mg SubCUTAneous Daily    pantoprazole  40 mg Oral QAM AC    latanoprost  1 drop Both Eyes Nightly    atorvastatin  20 mg Oral Nightly    levothyroxine  75 mcg Oral Daily    dorzolamide-timolol  1 drop Both Eyes BID    lisinopril  20 mg Oral Daily    amLODIPine  5 mg Oral Daily    sodium chloride flush  10 mL IntraVENous 2 times per day    cefTRIAXone (ROCEPHIN) IV  1,000 mg IntraVENous Q24H    tetanus-diphth-acell pertussis  0.5 mL IntraMUSCular Once     PRN Meds: sodium chloride, traMADol, sodium chloride, sodium chloride flush, sodium chloride, ondansetron, polyethylene glycol, acetaminophen **OR** acetaminophen, morphine      Intake/Output Summary (Last 24 hours) at 5/3/2022 1749  Last data filed at 5/3/2022 1231  Gross per 24 hour   Intake 1425 ml   Output 475 ml   Net 950 ml       Physical Exam Performed:    /71   Pulse 75   Temp 97.7 °F (36.5 °C) (Oral)   Resp 15   Ht 5' (1.524 m)   Wt 107 lb 12.9 oz (48.9 kg)   SpO2 96%   BMI 21.05 kg/m²       Gen: NAD  HEENT: NC/AT, moist mucous membranes, no oropharyngeal erythema or exudate  Neck: supple, trachea midline  Heart: Normal s1/s2, RRR, no murmurs, gallops, or rubs.    Lungs: clear bilaterally, no wheezing, no rales, no rhonchi, no use of accessory muscles  Abd: bowel sounds +, soft, nondistended, no masses  Extrem: No clubbing, cyanosis, no edema  Skin: no rashes or lesions  Psych: Asleep, able to be awakened  Neuro: grossly intact, moves all four extremities spontaneously. Cap refill: +2 sec    Labs:   Recent Labs     05/01/22  0610 05/01/22  0610 05/02/22  0513 05/02/22  1554 05/03/22  0649   WBC 7.7  --  5.9  --  5.8   HGB 8.2*   < > 6.8* 10.9* 9.2*   HCT 26.0*   < > 20.5* 33.3* 27.6*     --  127*  --  135    < > = values in this interval not displayed. Recent Labs     05/02/22  0513 05/02/22  1554 05/03/22  0649    137 136   K 4.1 4.0 4.0    106 106   CO2 22 18* 20*   BUN 22* 19 20   CREATININE 0.8 0.7 0.6   CALCIUM 7.9* 7.9* 7.5*     No results for input(s): AST, ALT, BILIDIR, BILITOT, ALKPHOS in the last 72 hours. No results for input(s): INR in the last 72 hours. No results for input(s): Shameka Breeze in the last 72 hours. Urinalysis:      Lab Results   Component Value Date    NITRU Negative 04/29/2022    WBCUA 144 04/29/2022    BACTERIA 4+ 04/29/2022    RBCUA 21 04/29/2022    BLOODU Moderate 04/29/2022    SPECGRAV 1.016 04/29/2022    GLUCOSEU Negative 04/29/2022       Radiology:  [facility] FOR SURGICAL PROCEDURES   Final Result      XR HIP 1 VW W PELVIS LEFT   Final Result      XR CHEST 1 VIEW   Final Result   No radiographic evidence of acute cardiopulmonary disease. XR TIBIA FIBULA RIGHT (2 VIEWS)   Final Result   No acute fracture of the right tibia or fibula. 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 Postop changes right globe         XR KNEE LEFT (1-2 VIEWS)   Final Result   Severe osteopenia limiting the exam.      Moderate osteoarthritic changes medially in the knee which is unchanged with   no acute bony abnormality. Ill-defined sclerotic areas throughout the diametaphyseal region of the   distal femur which is more prominent and could be due to bone infarcts.    Suggest MRI for further characterization, if indicated. Small suprapatellar effusion         XR ELBOW LEFT (MIN 3 VIEWS)   Final Result   Mildly displaced intra-articular fracture involving the lateral epicondyle of   the proximal humerus. XR HIP 2-3 VW W PELVIS LEFT   Final Result   1. There is a periprosthetic fracture of the left proximal femur with a   displaced lesser trochanter is well as fracture extension through the   proximal diaphysis. The femoral component appears well seated within the   acetabular cup.   2. Suspected nondisplaced fractures through the left superior pubic ramus. 3. Severe osteopenia limits detection for nondisplaced fractures. Assessment/Plan:    Active Hospital Problems    Diagnosis     Anemia [D64.9]      Priority: Medium    Priscilla-prosthetic fracture around prosthetic hip [M97. 8XXA, Z96.649]      Priority: Medium    Acute cystitis with hematuria [N30.01]      Priority: Medium    Acquired hypothyroidism [E03.9]      Priority: Medium    Essential hypertension [I10]      Priority: Medium    Hyperlipidemia [E78.5]      Priority: Medium    Hypokalemia [E87.6]      Priority: Medium    Minimally displaced fracture of lateral condyle of left humerus, initial encounter for closed fracture [S42.452A]      Priority: Medium    Osteoporosis with current pathological fracture, initial encounter [M80.00XA]      Priority: Medium    Pre-operative examination [Z01.818]      Priority: Medium    Fall [W19. XXXA]      Priority: Medium    Elevated troponin [R77.8]      Priority: Medium    Mild protein-calorie malnutrition (HCC) [E44.1]      Priority: Medium    Left displaced femoral neck fracture with a hemiarthroplasty left hip 8- [facility] course: 80 y. o. year-old female with a history of hypertension, hyperlipidemia, hypothyroidism and a h/o a left displaced femoral neck fracture with a hemiarthroplasty left hip 8-.  She lives at home alone.   She fell casing increased pain from her left hip. In the emergency room she was found to have a left periprosthetic hip fracture, a displaced left lateral epicondyle fracture and a laceration to her right shin. She is unsure of what caused her to fall. She states that she did not loose consciousness, but does not recall what caused her to fall.   She was also found to have a uti.     Plan:  Hip fracture  - pain control  - pt/ot after surgery  - osteoporosis  -Vitamin D, oscal, out pt dexascan. Consider bisphosphonates   - consulted ortho     UTI  - UCx with citrobacter  - rocephin day 4     Anemia, eligio  Chronic with acute worsening due to dilutional effects.  - getting 1 unit prbcs today, may need another unit after surgery      Chronic conditions - continue home meds unless otherwise stated  htn      Diet: ADULT DIET;  Regular  Code Status: DNR-CCA    PT/OT Eval Status: ordered    Dispo - cont care, joanne campos in am to SNF    Randalyn Gilford, MD

## 2022-05-03 NOTE — CARE COORDINATION
INITIAL CASE MANAGEMENT ASSESSMENT    Reviewed chart, met with patient to assess possible discharge needs. Explained Case Management role/services. Living Situation: lives at home alone in a 2 level home- bedroom and both bathrooms are on the second floor    ADLs: independent- family takes turns stopping by on  and the weekends and assist w/ cleaning and groceries and laundry     DME: uses [employer]- has one on each floor of her home  Also has shower chair-grab bars in the shower-cane- long handled shoe horn- Alert button- transport chair    PT/OT Recs:     81 yo female admitted  for a fall with UTI resulting in LLE periprosthetic femur fracture and lateral condyle of left humerus (NWB).  s/p LLE femur ORIF (50% WB). PMH: hemiarthroplasty left hip (Aug 2016), HTN, Glaucoma, TIA. PTA, pt lives alone with assist for IADL, otherwise, IND ADL and fxl mobiltiy in [facility]. Today, pt functioning below baseline most limited by LUE NWB, LLE 50% WB, expected pain, and decreased strength. Pt required Total A x2 bed mobility, Total A LB dressing and toileting, and set up supported seated grooming. Pt dependent EOB>recliner wtih son d/t inability to complete sit>stand within stedy from elevated EOB with A x2. Pt tolerates ~25 min sitting EOB varying from Max to Min A with R lean. LUE digit swelling, RUE WFL. COnt acute OT to address above deficits. Pt is a high fall risk with low AMPAC score not associated with safe return home. Rec slow pace of OT 3-5x/week until weight bearing progresses    Active Services: none     Transportation: will need medical transport at dc     Medications: no issues    PCP: Belkis Quiroz      HD/PD: n/a    PLAN/COMMENTS: spoke w/ patient and her daughter Shelia Look at bedside to discuss PT/OT recommendations for continued therapy in a skilled facility . SNF list provided for review and they [facility] [address]  [facility] is related to the following treatment goals: SNF    The Patient and/or patient representative  was provided with a choice of provider and agrees   with the discharge plan. [x] Yes [] No    Freedom of choice list was provided with basic dialogue that supports the patient's individualized plan of care/goals, treatment preferences and shares the quality data associated with the providers. [x] Yes [] No    SW/CM provided contact information for patient or family to call with any questions. SW/CM will follow and assist as needed.     Electronically signed by Magno Esposito on 5/3/2022 at 2:02 PM  #[phone]

## 2022-05-03 NOTE — PLAN OF CARE
Problem: Discharge Planning  Goal: Discharge to home or other facility with appropriate resources  5/3/2022 1110 by Audrey Zuniga RN  Outcome: Progressing  Note: Pt will be discharged to a SNF. SW and medical team are following. Will monitor. 5/2/2022 2318 by Ene Lee RN  Note: Patient will be discharged with appropriate resources      Problem: Safety - Adult  Goal: Free from fall injury  5/3/2022 1110 by Audrey Zuniga RN  Outcome: Progressing  Note: Pt is free of injury. No injury noted. Fall precautions in place. Call light within reach. Will monitor. 5/2/2022 2318 by Ene Lee RN  Note: Patient will be free from falls     Problem: ABCDS Injury Assessment  Goal: Absence of physical injury  5/3/2022 1110 by Audrey Zuniga RN  Outcome: Progressing  Note: Pt is free of injury. No injury noted. Fall precautions in place. Call light within reach. Will monitor. 5/2/2022 2318 by Ene Lee RN  Note: Patient will be absent of physical injury     Problem: Skin/Tissue Integrity  Goal: Absence of new skin breakdown  Description: 1. Monitor for areas of redness and/or skin breakdown  2. Assess vascular access sites hourly  3. Every 4-6 hours minimum:  Change oxygen saturation probe site  4. Every 4-6 hours:  If on nasal continuous positive airway pressure, respiratory therapy assess nares and determine need for appliance change or resting period. 5/3/2022 1110 by Audrye Zuniga RN  Outcome: Progressing  Note: Pt is free of skin integrity. Will monitor and encourage patient to turn and reposition every two hours. 5/2/2022 2318 by Ene Lee RN  Note: Patient will be absent of new skin breakdown     Problem: Pain  Goal: Verbalizes/displays adequate comfort level or baseline comfort level  5/3/2022 1110 by Audrey Zuniga RN  Outcome: Progressing  Note: Pt verbalizing comfort level. PRN medications administered as needed. Will monitor.    5/2/2022 2318 by Nydia Red Jagdish Huber RN  Note: Patients comfort will be at baseline

## 2022-05-03 NOTE — OP NOTE
[address]                                OPERATIVE REPORT    PATIENT NAME: Phoenix Rodriguez                     :        1929  MED REC NO:   [id number]                          ROOM:       3119  ACCOUNT NO:   [de-identified]                           ADMIT DATE: 2022  PROVIDER:     Wilian Anderson MD      DATE OF PROCEDURE:  2022    PREOPERATIVE DIAGNOSES:  1. Left periprosthetic proximal femur fracture. 2.  Acute on chronic normocytic anemia. POSTOPERATIVE DIAGNOSES:  1. Left periprosthetic proximal femur fracture. 2.  Acute on chronic normocytic anemia. OPERATION PERFORMED:  Open treatment of the left periprosthetic proximal  femur fracture with internal fixation. SURGEON:  Wilian Anderson MD    FIRST ASSISTANT:  Peng Altamirano. ANESTHESIA:  General endotracheal.    BLOOD LOSS:  Approximately 300 mL. COMPLICATIONS:  None immediately apparent. OPERATIVE FINDINGS:  The left [place] Accolade femoral stem appeared  grossly intact intraoperatively. The hip/stem was stressed under live  fluoroscopy and was found to be stable. The fracture was localized   around the peritrochanteric region, no extension distally. Therefore,   the decision was made to proceed with internal fixation and not revision   arthroplasty. IMPLANTS:  Synthes five-hole periprosthetic proximal femur hook plate. INDICATIONS:  The patient is a pleasant 80-year-old female who lives at  home independently and uses a walker for ambulation who had a fall 3  days ago. She sustained a left periprosthetic proximal femur fracture  as well as a left lateral condyle elbow fracture. The elbow will be  treated in a closed fashion; however, the left periprosthetic femur  fracture did require surgical intervention.   I had a lengthy discussion  with the patient and her family preoperatively regarding details of this  injury and recommended treatment, either operative fixation versus  revision arthroplasty based on the stability of the femoral stem. An  opportunity for questions was afforded and all questions were answered  appropriately. The patient and her family wished to proceed. The  patient's preoperative lab work showed she was anemic at baseline,  however, hemoglobin dropped to 6.8 this morning. She was given 2 units  of packed red blood cells this morning prior to surgery. Additionally,   her vitals remained stable throughout. A vitamin D level was checked  this morning and was found to be very low. She was started on vitamin  D3 supplementation. OPERATIVE PROCEDURE:  The patient was brought back to the OR and  transferred onto the operating room table. General anesthesia was  administered. The patient brought to the right lateral decubitus  position with the left side up. A right axillary roll was placed and  pressure points were well padded including of the peroneal nerve. The  patient was supported in the lateral position using the pegboard system. The entire left lower extremity up to the flank was subsequently prepped  and draped in the usual sterile fashion. A full time-out was performed  with all parties in agreement. The patient did receive Ancef for  preoperative IV antibiotics. A direct lateral incision was made over  the greater trochanter region and this incision was carried down past  the subcutaneous fat. More proximally in the wound, there was defect of  the iliotibial band/fascia fidelina. More distally, the fascia was relatively  thinned out. This was split inline with the skin incision and the  self-retainers were placed. The gluteus medius was overall intact and  prior nonabsorbable sutures were seen from her previous left  hemiarthroplasty from 2016. The vastus lateralis was visualized and  grossly intact as well.   The greater trochanter fracture was more or  less reduced appropriately and was relatively stable. The vastus  lateralis was elevated from posterior to anterior while partially  releasing its origin at the vastus lateralis ridge to expose the  fracture site. The bone was very poor quality with comminution more  posteriorly. One Hohmann retractor was placed around the anterior  cortex and a Wang elevator was used to develop a plane along the lateral  proximal femoral cortex. More distally, vastus lateralis split was   used to expose the femur. I then selected the smallest periprosthetic  proximal femur plate from the Synthes tray, and this was positioned  appropriately. The proximal hooks engaged the greater trochanter  fracture fragment nicely and secured it adequately. The greater  trochanter fracture was also well apposed to the proximal femoral shaft. Once the plate was positioned appropriately and confirmed on  fluoroscopy, I elected to place three 1.7 mm cerclage cables, two of  which were placed through cerclage positioning pins. I elected  against placing screws into the shaft in order to avoid disruption of  the femoral stem and to prevent iatrogenic loosening. The proximal  hooks were again checked after securing with three cables and again this  adequately captured and secured the greater trochanter fracture  fragment. Once the cables were appropriately tensioned, they were  crimped to secure the cables and then the excess cables were cut. Final fluoroscopic spot images were obtained and saved. Live  fluoroscopy was again utilized to ensure that the reduction and fixation  was adequate as well as to ensure that the stem remained well fixed and  this appeared to be the case. The surgical wound was thoroughly  irrigated with normal saline and 1 gm of topical vancomycin powder was  applied.   1 mL of demineralized bone matrix putty was injected over the  posterolateral aspect of the fracture site to promote fracture healing  and then the vastus lateralis split and origin were repaired using #1  Vicryl sutures. Next, the fascia/iliotibial band was repaired and a  watertight closure was obtained. Finally, the skin was closed with  inverted Vicryls and 3-0 nylon. The incision was cleaned with wet and  dries and then dressed with water-occlusive dressing. The drapes were  removed. The patient was transferred back onto the hospital bed where  she was extubated and moved to the PACU.         Cordell Collins MD    D: 05/02/2022 21:10:09       T: 05/03/2022 2:42:00     [id number]  Job#: [id number]     Doc#: [id number]    CC:

## 2022-05-03 NOTE — PROGRESS NOTES
Pt working with therapy. Complains of pain. PRN medication administered. Will monitor.  Electronically signed by Young Lane RN on 5/3/2022 at 11:10 AM

## 2022-05-04 VITALS
OXYGEN SATURATION: 97 % | HEIGHT: 60 IN | HEART RATE: 76 BPM | WEIGHT: 109.35 LBS | BODY MASS INDEX: 21.47 KG/M2 | TEMPERATURE: 98.4 F | DIASTOLIC BLOOD PRESSURE: 83 MMHG | SYSTOLIC BLOOD PRESSURE: 138 MMHG | RESPIRATION RATE: 16 BRPM

## 2022-05-04 LAB
ANION GAP SERPL CALCULATED.3IONS-SCNC: 11 MMOL/L (ref 3–16)
BASOPHILS ABSOLUTE: 0 K/UL (ref 0–0.2)
BASOPHILS RELATIVE PERCENT: 0.1 %
BUN BLDV-MCNC: 18 MG/DL (ref 7–20)
CALCIUM SERPL-MCNC: 7.8 MG/DL (ref 8.3–10.6)
CHLORIDE BLD-SCNC: 106 MMOL/L (ref 99–110)
CO2: 22 MMOL/L (ref 21–32)
CREAT SERPL-MCNC: 0.6 MG/DL (ref 0.6–1.2)
EOSINOPHILS ABSOLUTE: 0 K/UL (ref 0–0.6)
EOSINOPHILS RELATIVE PERCENT: 0.3 %
GFR AFRICAN AMERICAN: >60
GFR NON-AFRICAN AMERICAN: >60
GLUCOSE BLD-MCNC: 94 MG/DL (ref 70–99)
HCT VFR BLD CALC: 27.8 % (ref 36–48)
HEMOGLOBIN: 9.3 G/DL (ref 12–16)
LYMPHOCYTES ABSOLUTE: 1.5 K/UL (ref 1–5.1)
LYMPHOCYTES RELATIVE PERCENT: 21.3 %
MCH RBC QN AUTO: 29 PG (ref 26–34)
MCHC RBC AUTO-ENTMCNC: 33.4 G/DL (ref 31–36)
MCV RBC AUTO: 86.9 FL (ref 80–100)
MONOCYTES ABSOLUTE: 0.8 K/UL (ref 0–1.3)
MONOCYTES RELATIVE PERCENT: 11.4 %
NEUTROPHILS ABSOLUTE: 4.6 K/UL (ref 1.7–7.7)
NEUTROPHILS RELATIVE PERCENT: 66.9 %
PDW BLD-RTO: 16.2 % (ref 12.4–15.4)
PLATELET # BLD: 172 K/UL (ref 135–450)
PMV BLD AUTO: 7.5 FL (ref 5–10.5)
POTASSIUM REFLEX MAGNESIUM: 3.9 MMOL/L (ref 3.5–5.1)
RBC # BLD: 3.2 M/UL (ref 4–5.2)
SARS-COV-2: NOT DETECTED
SODIUM BLD-SCNC: 139 MMOL/L (ref 136–145)
WBC # BLD: 6.9 K/UL (ref 4–11)

## 2022-05-04 PROCEDURE — 6360000002 HC RX W HCPCS: Performed by: NURSE PRACTITIONER

## 2022-05-04 PROCEDURE — 2580000003 HC RX 258: Performed by: ORTHOPAEDIC SURGERY

## 2022-05-04 PROCEDURE — 6360000002 HC RX W HCPCS: Performed by: ORTHOPAEDIC SURGERY

## 2022-05-04 PROCEDURE — 6370000000 HC RX 637 (ALT 250 FOR IP): Performed by: ORTHOPAEDIC SURGERY

## 2022-05-04 PROCEDURE — 6370000000 HC RX 637 (ALT 250 FOR IP): Performed by: NURSE PRACTITIONER

## 2022-05-04 PROCEDURE — 36415 COLL VENOUS BLD VENIPUNCTURE: CPT

## 2022-05-04 PROCEDURE — 85025 COMPLETE CBC W/AUTO DIFF WBC: CPT

## 2022-05-04 PROCEDURE — 80048 BASIC METABOLIC PNL TOTAL CA: CPT

## 2022-05-04 RX ORDER — CEFDINIR 300 MG/1
300 CAPSULE ORAL 2 TIMES DAILY
Qty: 14 CAPSULE | Refills: 0 | Status: SHIPPED | OUTPATIENT
Start: 2022-05-04 | End: 2022-05-11

## 2022-05-04 RX ADMIN — ENOXAPARIN SODIUM 30 MG: 100 INJECTION SUBCUTANEOUS at 09:59

## 2022-05-04 RX ADMIN — LEVOTHYROXINE SODIUM 75 MCG: 0.07 TABLET ORAL at 05:36

## 2022-05-04 RX ADMIN — PANTOPRAZOLE SODIUM 40 MG: 40 TABLET, DELAYED RELEASE ORAL at 05:36

## 2022-05-04 RX ADMIN — DORZOLAMIDE HYDROCHLORIDE AND TIMOLOL MALEATE 1 DROP: 20; 5 SOLUTION/ DROPS OPHTHALMIC at 09:59

## 2022-05-04 RX ADMIN — TRAMADOL HYDROCHLORIDE 50 MG: 50 TABLET, COATED ORAL at 12:33

## 2022-05-04 RX ADMIN — TRAMADOL HYDROCHLORIDE 50 MG: 50 TABLET, COATED ORAL at 05:35

## 2022-05-04 RX ADMIN — SODIUM CHLORIDE 25 ML: 9 INJECTION, SOLUTION INTRAVENOUS at 12:32

## 2022-05-04 RX ADMIN — ACETAMINOPHEN 650 MG: 325 TABLET ORAL at 05:32

## 2022-05-04 RX ADMIN — SODIUM CHLORIDE, PRESERVATIVE FREE 10 ML: 5 INJECTION INTRAVENOUS at 09:59

## 2022-05-04 RX ADMIN — METHYLNALTREXONE BROMIDE 12 MG: 12 INJECTION, SOLUTION SUBCUTANEOUS at 12:39

## 2022-05-04 RX ADMIN — AMLODIPINE BESYLATE 5 MG: 5 TABLET ORAL at 09:59

## 2022-05-04 RX ADMIN — Medication 5000 UNITS: at 09:59

## 2022-05-04 RX ADMIN — CEFTRIAXONE 1000 MG: 1 INJECTION, POWDER, FOR SOLUTION INTRAMUSCULAR; INTRAVENOUS at 12:32

## 2022-05-04 RX ADMIN — ASPIRIN 81 MG 81 MG: 81 TABLET ORAL at 09:59

## 2022-05-04 RX ADMIN — LISINOPRIL 20 MG: 20 TABLET ORAL at 09:59

## 2022-05-04 ASSESSMENT — PAIN DESCRIPTION - ORIENTATION
ORIENTATION: LEFT
ORIENTATION: LEFT

## 2022-05-04 ASSESSMENT — PAIN - FUNCTIONAL ASSESSMENT
PAIN_FUNCTIONAL_ASSESSMENT: PREVENTS OR INTERFERES SOME ACTIVE ACTIVITIES AND ADLS
PAIN_FUNCTIONAL_ASSESSMENT: PREVENTS OR INTERFERES SOME ACTIVE ACTIVITIES AND ADLS

## 2022-05-04 ASSESSMENT — PAIN DESCRIPTION - LOCATION
LOCATION: ELBOW;HIP
LOCATION: ELBOW

## 2022-05-04 ASSESSMENT — PAIN DESCRIPTION - DESCRIPTORS
DESCRIPTORS: ACHING
DESCRIPTORS: ACHING

## 2022-05-04 ASSESSMENT — PAIN DESCRIPTION - PAIN TYPE: TYPE: ACUTE PAIN;SURGICAL PAIN

## 2022-05-04 ASSESSMENT — PAIN DESCRIPTION - ONSET: ONSET: GRADUAL

## 2022-05-04 ASSESSMENT — PAIN SCALES - GENERAL
PAINLEVEL_OUTOF10: 0
PAINLEVEL_OUTOF10: 6
PAINLEVEL_OUTOF10: 5

## 2022-05-04 ASSESSMENT — PAIN DESCRIPTION - FREQUENCY: FREQUENCY: CONTINUOUS

## 2022-05-04 NOTE — PROGRESS NOTES
Pt A&O in the bed. Pt tolerating PO intake well, but intake is minimal. Encouraging items patient likes. Dressing to left hip is clean, dry and intact. No drainage noted. Splint to left arm is clean, dry and intact. Pulses palpable to left radial pulse. Left hand continues to be swollen. Elevated and ice applied. Will monitor. Call light within reach. Able to make needs known. Fall precautions in place. Will monitor.  Electronically signed by Roxanne Qiu RN on 5/4/2022 at 11:27 AM

## 2022-05-04 NOTE — PLAN OF CARE
Problem: Discharge Planning  Goal: Discharge to home or other facility with appropriate resources  5/4/2022 1128 by Naomi De Leon RN  Outcome: Progressing  Note: Pt will be discharged to appropriate level of care. Plan is to discharge to a SNF. SW and medical team are following. Will monitor. 5/4/2022 6289 by Delroy Mccauley RN  Outcome: Progressing  Flowsheets  Taken 5/4/2022 8880  Discharge to home or other facility with appropriate resources:   Identify barriers to discharge with patient and caregiver   Identify discharge learning needs (meds, wound care, etc)  Taken 5/3/2022 2136  Discharge to home or other facility with appropriate resources: Identify barriers to discharge with patient and caregiver     Problem: Safety - Adult  Goal: Free from fall injury  5/4/2022 1128 by Naomi De Leon RN  Outcome: Progressing  Note: Pt is free of injury. No injury noted. Fall precautions in place. Call light within reach. Will monitor. 5/4/2022 8032 by Delroy Mccauley RN  Outcome: Progressing  Flowsheets (Taken 5/4/2022 5617)  Free From Fall Injury: Instruct family/caregiver on patient safety     Problem: ABCDS Injury Assessment  Goal: Absence of physical injury  5/4/2022 1128 by Naomi De Leon RN  Outcome: Progressing  Note: Pt is free of injury. No injury noted. Fall precautions in place. Call light within reach. Will monitor. 5/4/2022 0838 by Delroy Mccauley RN  Outcome: Progressing  Note: Patient remains free from physical injury. Patient educated on safety precautions. Will continue to monitor to ensure patient remains free from physical injury throughout remainder of shift. Problem: Skin/Tissue Integrity  Goal: Absence of new skin breakdown  Description: 1. Monitor for areas of redness and/or skin breakdown  2. Assess vascular access sites hourly  3. Every 4-6 hours minimum:  Change oxygen saturation probe site  4.   Every 4-6 hours:  If on nasal continuous positive airway pressure, respiratory therapy assess nares and determine need for appliance change or resting period. 5/4/2022 1128 by Shiv Landers RN  Outcome: Progressing  Note: Pt remains free of new skin integrity. Will monitor and encourage patient to turn and reposition every two hours. 5/4/2022 0838 by Keaton Kaufman RN  Outcome: Progressing  Note: Skin assessment complete. No new signs of skin breakdown noted. Repositioning patient with pillows at two hour intervals. Heels elevated off bed. Problem: Pain  Goal: Verbalizes/displays adequate comfort level or baseline comfort level  5/4/2022 1128 by Shiv Landers RN  Outcome: Progressing  Note: Pt verbalizes adequate pain goals. Will monitor.    5/4/2022 0838 by Keaton Kaufman RN  Outcome: Progressing  Flowsheets (Taken 5/4/2022 1954)  Verbalizes/displays adequate comfort level or baseline comfort level:   Encourage patient to monitor pain and request assistance   Assess pain using appropriate pain scale

## 2022-05-04 NOTE — PROGRESS NOTES
Pt discharged to home. Transported out via stretcher. Daughter at bedside and will meet at [facility]. Transported in personal vehicle. Discharge instructions and personal belongings given to pt. Inpatient pharmacy delivered home medications to patient in the room. Pt's daughter has patient's home eye drops. Explanation of discharge medications and instructions understood by verbal statement. No questions, comments or concerns at this time.  Electronically signed by Carrington Cabello RN on 5/4/2022 at 2:04 PM

## 2022-05-04 NOTE — CARE COORDINATION
DISCHARGE SUMMARY     DATE OF DISCHARGE: 5/4/22    DISCHARGE DESTINATION: skilled facility - [address]    Level of Care: Skilled  Discharging to Facility/ Agency   · Name: [facility]  · Address:  [address], [address]   · Phone:  [phone]  · Fax:  [phone]    Report Number: [phone]    Fax Number:  [phone]    Precert Obtained: N/A    Hens Completed: yes    PASARR: N/A    Notified: RN, Family and Facility/Agency    Prescriptions Faxed:yes    [facility] [address] Name: [employer] up Time: 1:30PM  Phone Number: [phone]    COMMENTS: COVID doreen 5/3    Electronically signed by Susie Ibarra on 5/4/2022 at 11:29 AM  #[phone]

## 2022-05-04 NOTE — PROGRESS NOTES
Pt discharging at 1:30pm per SW. MD notified regarding patient not having a BM since 4/29/22. New order for Relistor. Will administer.  Electronically signed by Magdiel Goode RN on 5/4/2022 at 11:28 AM

## 2022-05-04 NOTE — PROGRESS NOTES
[facility] Orthopedic Surgery   Progress Note      S/P :  SUBJECTIVE  IN bed. Alert and oriented in no distress. States pain ful to move to chair with therapies. . Pain is   described in left elbow and left hip and with the intensity of moderate. Pain is described as aching. OBJECTIVE              Physical                      VITALS:  /77   Pulse 83   Temp 97.3 °F (36.3 °C) (Oral)   Resp 14   Ht 5' (1.524 m)   Wt 109 lb 5.6 oz (49.6 kg)   SpO2 96%   BMI 21.36 kg/m²                     MUSCULOSKELETAL:  left foot NVI. Wiggles toes to command. Able to plantarflex and dorsiflex ankle Pedal pulses are palpable. Left wrist with intact flex/ext and hand grasp and extension and thumbs up Left finger and hand with mild swelling today, improved from yesterday                   NEUROLOGIC:                                  Sensory:  Touch:  Left Upper Extremity:  normal  Left Lower Extremity:  normal                                                 Surgical wound appears clean and dry left hip with gauze and tape dressing.      Data       CBC:   Lab Results   Component Value Date    WBC 6.9 05/04/2022    RBC 3.20 05/04/2022    HGB 9.3 05/04/2022    HCT 27.8 05/04/2022    MCV 86.9 05/04/2022    MCH 29.0 05/04/2022    MCHC 33.4 05/04/2022    RDW 16.2 05/04/2022     05/04/2022    MPV 7.5 05/04/2022        WBC:    Lab Results   Component Value Date    WBC 6.9 05/04/2022        Hemoglobin/Hematocrit:    Lab Results   Component Value Date    HGB 9.3 05/04/2022    HCT 27.8 05/04/2022        PT/INR:    Lab Results   Component Value Date    PROTIME 11.3 04/29/2022    INR 1.00 04/29/2022              Current Inpatient Medications             Current Facility-Administered Medications: methylnaltrexone (RELISTOR) injection 12 mg, 12 mg, SubCUTAneous, Once  acetaminophen (TYLENOL) tablet 650 mg, 650 mg, Oral, 3 times per day  vitamin D (CHOLECALCIFEROL) capsule 5,000 Units, 5,000 Units, Oral, Daily  0.9 % sodium chloride infusion, , IntraVENous, PRN  aspirin chewable tablet 81 mg, 81 mg, Oral, Daily  enoxaparin Sodium (LOVENOX) injection 30 mg, 30 mg, SubCUTAneous, Daily  traMADol (ULTRAM) tablet 50 mg, 50 mg, Oral, Q6H PRN  0.9 % sodium chloride infusion, , IntraVENous, PRN  pantoprazole (PROTONIX) tablet 40 mg, 40 mg, Oral, QAM AC  latanoprost (XALATAN) 0.005 % ophthalmic solution 1 drop, 1 drop, Both Eyes, Nightly  atorvastatin (LIPITOR) tablet 20 mg, 20 mg, Oral, Nightly  levothyroxine (SYNTHROID) tablet 75 mcg, 75 mcg, Oral, Daily  dorzolamide-timolol (COSOPT) 22.3-6.8 MG/ML ophthalmic solution 1 drop, 1 drop, Both Eyes, BID  lisinopril (PRINIVIL;ZESTRIL) tablet 20 mg, 20 mg, Oral, Daily  amLODIPine (NORVASC) tablet 5 mg, 5 mg, Oral, Daily  sodium chloride flush 0.9 % injection 10 mL, 10 mL, IntraVENous, 2 times per day  sodium chloride flush 0.9 % injection 10 mL, 10 mL, IntraVENous, PRN  0.9 % sodium chloride infusion, , IntraVENous, PRN  ondansetron (ZOFRAN) injection 4 mg, 4 mg, IntraVENous, Q4H PRN  polyethylene glycol (GLYCOLAX) packet 17 g, 17 g, Oral, Daily PRN  acetaminophen (TYLENOL) tablet 650 mg, 650 mg, Oral, Q4H PRN **OR** acetaminophen (TYLENOL) suppository 650 mg, 650 mg, Rectal, Q4H PRN  cefTRIAXone (ROCEPHIN) 1000 mg IVPB in 50 mL D5W minibag, 1,000 mg, IntraVENous, Q24H  morphine (PF) injection 1 mg, 1 mg, IntraVENous, Q4H PRN  tetanus-diphth-acell pertussis (BOOSTRIX) injection 0.5 mL, 0.5 mL, IntraMUSCular, Once    ASSESSMENT AND PLAN    Fall at home  Anemia, post transfusion with improvement and stable. Will observe  Left proximal femur periprosthetic fracture. Prior hemiarthroplasty. Post ORIF with plate and cables per Dr Meryle Christen, stable exam  Left elbow condylar fx, conservative treatment in splint, NWB  PT OT following  Lovenox as inpt then switch to Eliquis 2.5mg PO BID x 30 days for DVT prophylaxis per Dr Meryle Christen request  Pain control with po meds. Add tylenol ATC as well. SS for DC planning. Stable for DC per ortho  Followup Dr Vinod Londono in office in 2 weeks. Change left hip dressing today to mepilex AG. Princess Yanez RN informed and she will perform task.        JOSE F Snowden - CNP  5/4/2022  11:36 AM

## 2022-05-04 NOTE — PROGRESS NOTES
Report called to [address] [place] [address] All questions answered.  Electronically signed by Geovany Duque RN on 5/4/2022 at 12:19 PM

## 2022-05-04 NOTE — PROGRESS NOTES
[facility] called this RN to make aware of 15 beats of V-Tach. NP in the room during this time talking. Pt asymptomatic.  Electronically signed by Marce Juarez RN on 5/4/2022 at 1:15 PM

## 2022-05-16 ENCOUNTER — OFFICE VISIT (OUTPATIENT)
Dept: ORTHOPEDIC SURGERY | Age: 87
End: 2022-05-16
Payer: MEDICARE

## 2022-05-16 VITALS — HEIGHT: 60 IN | WEIGHT: 109 LBS | BODY MASS INDEX: 21.4 KG/M2

## 2022-05-16 DIAGNOSIS — S42.452D: ICD-10-CM

## 2022-05-16 DIAGNOSIS — M97.02XD PERIPROSTHETIC FRACTURE AROUND INTERNAL PROSTHETIC LEFT HIP JOINT, SUBSEQUENT ENCOUNTER: Primary | ICD-10-CM

## 2022-05-16 PROCEDURE — 99213 OFFICE O/P EST LOW 20 MIN: CPT | Performed by: ORTHOPAEDIC SURGERY

## 2022-05-16 PROCEDURE — 1036F TOBACCO NON-USER: CPT | Performed by: ORTHOPAEDIC SURGERY

## 2022-05-16 PROCEDURE — 29065 APPL CST SHO TO HAND LNG ARM: CPT | Performed by: ORTHOPAEDIC SURGERY

## 2022-05-16 PROCEDURE — 4040F PNEUMOC VAC/ADMIN/RCVD: CPT | Performed by: ORTHOPAEDIC SURGERY

## 2022-05-16 PROCEDURE — 1090F PRES/ABSN URINE INCON ASSESS: CPT | Performed by: ORTHOPAEDIC SURGERY

## 2022-05-16 PROCEDURE — G8427 DOCREV CUR MEDS BY ELIG CLIN: HCPCS | Performed by: ORTHOPAEDIC SURGERY

## 2022-05-16 PROCEDURE — 1111F DSCHRG MED/CURRENT MED MERGE: CPT | Performed by: ORTHOPAEDIC SURGERY

## 2022-05-16 PROCEDURE — 1123F ACP DISCUSS/DSCN MKR DOCD: CPT | Performed by: ORTHOPAEDIC SURGERY

## 2022-05-16 PROCEDURE — G8420 CALC BMI NORM PARAMETERS: HCPCS | Performed by: ORTHOPAEDIC SURGERY

## 2022-05-16 NOTE — PROGRESS NOTES
ORTHOPAEDIC PROGRESS NOTE    Chief Complaint   Patient presents with    Post-Op Check     Open treatment of the left periprosthetic proximal femur fracture with internal fixation. HPI  5/16/22  Follow-up left periprosthetic proximal femur fracture s/p ORIF  Follow-up left elbow lateral condyle fracture treated conservatively  She is currently at [facility]  Pain rated 5 out of 10  Tolerated left elbow posterior splint  No rubbing or skin breakdown  No numbness or tingling      5/2/222  OPERATION PERFORMED:  Open treatment of the left periprosthetic proximal femur fracture with internal fixation. 4/30/22  80 y.o. female seen in consultation at the request of Oniel Burt MD for evaluation of left hip fracture, left elbow frature:   Onset yesterday  Injury/trauma - pulling curtains, ground level fall  History of symptoms - hx of left hip hemiarthroplasty for fracture in 2016 with Dr Shila Cramer  Pain is located left elbow and left hip diffuse  Worse with any ROM  Better with rest, pain meds  Associated with N/A  Neck pain = no  Radicular symptoms = no  Numbness and/or tingling = no  Uses walker for ambulation  Lives alone  No DM  No CAD  Does not smoke    Past Medical History:   Diagnosis Date    Acquired hypothyroidism 4/30/2022    Arthritis     Endometrial thickening on ultrasound     Glaucoma     Hyperlipidemia     Hypertension     Osteoporosis with current pathological fracture, initial encounter 4/30/2022    Pneumonia     Post-menopausal bleeding     TIA (transient ischemic attack)        Past Surgical History:   Procedure Laterality Date    BREAST SURGERY      left lumpectomy    HIP ARTHROPLASTY Left 08/26/2016    hemiarthroplasty left hip    THYROIDECTOMY, PARTIAL      VARICOSE VEIN SURGERY         Allergies   Allergen Reactions    Sulfa Antibiotics Nausea Only       Current Outpatient Medications   Medication Instructions    amLODIPine (NORVASC) 5 mg, Oral, DAILY    apixaban (ELIQUIS) 2.5 mg, Oral, 2 TIMES DAILY    aspirin 81 mg, Oral, DAILY, HOLD while on Eliquis    benazepril (LOTENSIN) 20 mg, Oral, DAILY    clobetasol (TEMOVATE) 0.05 % ointment Apply topically 2 times daily.  dorzolamide-timolol (COSOPT) 22.3-6.8 MG/ML ophthalmic solution 1 drop, Both Eyes, 2 TIMES DAILY    levothyroxine (SYNTHROID) 75 mcg, Oral, DAILY    lovastatin (MEVACOR) 40 mg, NIGHTLY    LUMIGAN 0.01 % SOLN ophthalmic drops 1 drop, Both Eyes, EVERY EVENING    omeprazole (PRILOSEC) 20 mg, DAILY    vitamin D (CHOLECALCIFEROL) 5,000 Units, Oral, DAILY       Vitals:    05/16/22 0954   Weight: 109 lb (49.4 kg)   Height: 5' (1.524 m)       Physical Exam:  Body mass index is 21.29 kg/m². NAD, family present  Left hip/thigh - lateral surgical incision well-healed   Sutures removed, Steri-Strips applied   No erythema or drainage   Negative logroll  LLE SILT SP/DP/T/LFC nerve distributions; EHL/FHL/TA/GS/quad intact  Left elbow - TTP lateral elbow   ROM not formally tested   Skin intact, no breakdown or ulceration  LUE SILT M/U/R/A/LABC nerve distributions; AIN/PIN/IO intact   Rad pulse intact      Imaging:  Images were personally reviewed by myself and discussed with the patient  AP pelvis and Left hip 2 views performed 5/16/22 - left periprosthetic proximal femur fracture stabilized with hook plate and 3 cerclage cables. Alignment/reduction unchanged. Femoral stem appears stable as well, no interval migration. Left elbow 2 views performed 5/16/22 - interval mild displacement of the lateral condyle fracture, displaced laterally and superiorly. The elbow joint alignment including the radiocapitellar articulation remains reduced on orthogonal imaging. Assessment & Plan:  80 y.o. female following up for   Diagnosis Orders   1.  Periprosthetic fracture around internal prosthetic left hip joint, subsequent encounter  XR HIP 2-3 VW W PELVIS LEFT   2. Closed fracture of lateral condyle of left elbow, with routine healing, subsequent encounter  XR ELBOW LEFT (2 VIEWS)    NE CAST SUP LONG ARM ADULT FBRG    NE APPLY LONG ARM CAST           Procedures    NE CAST SUP LONG ARM ADULT FBRG    NE APPLY LONG ARM CAST     Left hip/thigh -   Okay to get surgical site wet in the shower now, Steri-Strips will gradually fall off  Okay for full LLE weightbearing for transfers only; otherwise, 50% partial weightbearing when ambulating    Left elbow -   Interval increase in displacement  Elbow joint reduced  I recommend continued conservative care  LUE NWTERESA  Well padded long arm cast applied for immobilization, protection     FU in 1 month with repeat left hip and elbow XRs    Sarah Figueredo MD

## 2022-06-10 NOTE — DISCHARGE SUMMARY
Hospital Medicine Discharge Summary    Patient ID: Isael Arevalo      Patient's PCP: Jonel Vergara MD    Admit Date: 4/29/2022     Discharge Date: 5/4/2022      Admitting Provider: Lyla Car MD     Discharge Provider: Morteza Waddell MD     Discharge Diagnoses: Active Hospital Problems    Diagnosis     Anemia [D64.9]      Priority: Medium    Priscilla-prosthetic fracture around prosthetic hip [M97. 8XXA, Z96.649]      Priority: Medium    Acute cystitis with hematuria [N30.01]      Priority: Medium    Acquired hypothyroidism [E03.9]      Priority: Medium    Essential hypertension [I10]      Priority: Medium    Hyperlipidemia [E78.5]      Priority: Medium    Hypokalemia [E87.6]      Priority: Medium    Minimally displaced fracture of lateral condyle of left humerus, initial encounter for closed fracture [S42.452A]      Priority: Medium    Osteoporosis with current pathological fracture, initial encounter [M80.00XA]      Priority: Medium    Mild protein-calorie malnutrition ([address] [E44.1]      Priority: Medium    Left displaced femoral neck fracture with a hemiarthroplasty left hip 8- [S72.002A]        The patient was seen and examined on day of discharge and this discharge summary is in conjunction with any daily progress note from day of discharge. Bettie Minor y. o. year-old female with a history of hypertension, hyperlipidemia, hypothyroidism and a h/o a left displaced femoral neck fracture with a hemiarthroplasty left hip 8-. She lives at home alone.   She fell casing increased pain from her left hip. In the emergency room she was found to have a left periprosthetic hip fracture, a displaced left lateral epicondyle fracture and a laceration to her right shin. She is unsure of what caused her to fall.  She states that she did not loose consciousness, but does not recall what caused her to fall.   She was also found to have a uti.     Plan:  Hip fracture  - pain control  - pt/ot after surgery  - osteoporosis  -Vitamin D, oscal, out pt dexascan. Consider bisphosphonates   - consulted ortho, s/p ORIF     UTI  - UCx with citrobacter  - rocephin completed     Anemia, eligio  Chronic with acute worsening due to dilutional effects.  - getting 1 unit prbcs today, may need another unit after surgery      Chronic conditions - continue home meds unless otherwise stated  htn         Physical Exam Performed:     /83   Pulse 76   Temp 98.4 °F (36.9 °C) (Oral)   Resp 16   Ht 5' (1.524 m)   Wt 109 lb 5.6 oz (49.6 kg)   SpO2 97%   BMI 21.36 kg/m²       Gen: NAD  HEENT: NC/AT, moist mucous membranes, no oropharyngeal erythema or exudate  Neck: supple, trachea midline  Heart: Normal s1/s2, RRR, no murmurs, gallops, or rubs. Lungs: clear bilaterally, no wheezing, no rales, no rhonchi, no use of accessory muscles  Abd: bowel sounds +, soft, nondistended, no masses  Extrem: No clubbing, cyanosis, no edema  Skin: no rashes or lesions  Psych: Asleep, able to be awakened  Neuro: grossly intact, moves all four extremities spontaneously. Cap refill: +2 sec    Labs: For convenience and continuity at follow-up the following most recent labs are provided:      CBC:    Lab Results   Component Value Date    WBC 6.9 05/04/2022    HGB 9.3 05/04/2022    HCT 27.8 05/04/2022     05/04/2022       Renal:    Lab Results   Component Value Date     05/04/2022    K 3.9 05/04/2022     05/04/2022    CO2 22 05/04/2022    BUN 18 05/04/2022    CREATININE 0.6 05/04/2022    CALCIUM 7.8 05/04/2022    PHOS 3.0 07/15/2015         Significant Diagnostic Studies    Radiology:   FLUORO FOR SURGICAL PROCEDURES   Final Result      XR HIP 1 VW W PELVIS LEFT   Final Result      XR CHEST 1 VIEW   Final Result   No radiographic evidence of acute cardiopulmonary disease. XR TIBIA FIBULA RIGHT (2 VIEWS)   Final Result   No acute fracture of the right tibia or fibula.          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 Postop changes right globe         XR KNEE LEFT (1-2 VIEWS)   Final Result   Severe osteopenia limiting the exam.      Moderate osteoarthritic changes medially in the knee which is unchanged with   no acute bony abnormality. Ill-defined sclerotic areas throughout the diametaphyseal region of the   distal femur which is more prominent and could be due to bone infarcts. Suggest MRI for further characterization, if indicated. Small suprapatellar effusion         XR ELBOW LEFT (MIN 3 VIEWS)   Final Result   Mildly displaced intra-articular fracture involving the lateral epicondyle of   the proximal humerus. XR HIP 2-3 VW W PELVIS LEFT   Final Result   1. There is a periprosthetic fracture of the left proximal femur with a   displaced lesser trochanter is well as fracture extension through the   proximal diaphysis. The femoral component appears well seated within the   acetabular cup.   2. Suspected nondisplaced fractures through the left superior pubic ramus. 3. Severe osteopenia limits detection for nondisplaced fractures.                 Consults:     IP CONSULT TO HOSPITALIST  IP CONSULT TO CARDIOLOGY    Disposition:  SNF     Condition at Discharge: Stable    Discharge Instructions/Follow-up:  pcp in 1 week    Code Status:  Prior     Activity: activity as tolerated    Diet: regular diet      Discharge Medications:     Discharge Medication List as of 5/4/2022 12:05 PM           Details   apixaban (ELIQUIS) 2.5 MG TABS tablet Take 1 tablet by mouth 2 times daily, Disp-60 tablet, R-0Print      vitamin D (CHOLECALCIFEROL) 125 MCG (5000 UT) CAPS capsule Take 1 capsule by mouth daily, Disp-30 capsule, R-2Print      cefdinir (OMNICEF) 300 MG capsule Take 1 capsule by mouth 2 times daily for 7 days, Disp-14 capsule, R-0Print      traMADol (ULTRAM) 50 MG tablet Take 1 tablet by mouth every 6 hours as needed for Pain for up to 5 days. , Disp-20 tablet, R-0Print              Details   aspirin 81 MG tablet Take 1 tablet by mouth daily HOLD while on Eliquis, Disp-60 tablet, R-0NO PRINT              Details   dorzolamide-timolol (COSOPT) 22.3-6.8 MG/ML ophthalmic solution Place 1 drop into both eyes 2 times dailyHistorical Med      clobetasol (TEMOVATE) 0.05 % ointment Apply topically 2 times daily. , Disp-1 Tube, R-3, Normal      LUMIGAN 0.01 % SOLN ophthalmic drops Place 1 drop into both eyes every evening, DAWHistorical Med      benazepril (LOTENSIN) 20 MG tablet Take 20 mg by mouth dailyHistorical Med      amLODIPine (NORVASC) 5 MG tablet Take 1 tablet by mouth daily, Disp-30 tablet, R-3      levothyroxine (SYNTHROID) 75 MCG tablet Take 75 mcg by mouth Daily      lovastatin (MEVACOR) 40 MG tablet Take 40 mg by mouth nightly. omeprazole (PRILOSEC) 20 MG capsule Take 20 mg by mouth daily. Time Spent on discharge is more than 30 minutes in the examination, evaluation, counseling and review of medications and discharge plan. Signed:    Aalna Elizondo MD   6/10/2022      Thank you Carlyon Mcardle, MD for the opportunity to be involved in this patient's care. If you have any questions or concerns, please feel free to contact me at [phone].

## 2022-06-16 ENCOUNTER — OFFICE VISIT (OUTPATIENT)
Dept: ORTHOPEDIC SURGERY | Age: 87
End: 2022-06-16
Payer: MEDICARE

## 2022-06-16 VITALS — WEIGHT: 109 LBS | BODY MASS INDEX: 21.4 KG/M2 | HEIGHT: 60 IN

## 2022-06-16 DIAGNOSIS — M97.02XD PERIPROSTHETIC FRACTURE AROUND INTERNAL PROSTHETIC LEFT HIP JOINT, SUBSEQUENT ENCOUNTER: ICD-10-CM

## 2022-06-16 DIAGNOSIS — S42.452D: Primary | ICD-10-CM

## 2022-06-16 PROCEDURE — 1123F ACP DISCUSS/DSCN MKR DOCD: CPT | Performed by: ORTHOPAEDIC SURGERY

## 2022-06-16 PROCEDURE — 1090F PRES/ABSN URINE INCON ASSESS: CPT | Performed by: ORTHOPAEDIC SURGERY

## 2022-06-16 PROCEDURE — 1036F TOBACCO NON-USER: CPT | Performed by: ORTHOPAEDIC SURGERY

## 2022-06-16 PROCEDURE — G8427 DOCREV CUR MEDS BY ELIG CLIN: HCPCS | Performed by: ORTHOPAEDIC SURGERY

## 2022-06-16 PROCEDURE — G8420 CALC BMI NORM PARAMETERS: HCPCS | Performed by: ORTHOPAEDIC SURGERY

## 2022-06-16 PROCEDURE — L3760 EO ADJ JT PREFAB CUSTOM FIT: HCPCS | Performed by: ORTHOPAEDIC SURGERY

## 2022-06-16 PROCEDURE — 99213 OFFICE O/P EST LOW 20 MIN: CPT | Performed by: ORTHOPAEDIC SURGERY

## 2022-06-16 NOTE — PROGRESS NOTES
ORTHOPAEDIC PROGRESS NOTE    Chief Complaint   Patient presents with    Follow-up     fu left elbow and left hip fxs       HPI   6/16/22   Young Garg returns to clinic today for follow-up of her left elbow and left hip  She is 6 weeks postop from the hip surgery  She is at a skilled nursing facility  She reports she is doing okay  Pain is rated 5 out of 10 at worst, intermittent in nature  Tolerated cast for the elbow  No N/T      5/16/22  Follow-up left periprosthetic proximal femur fracture s/p ORIF  Follow-up left elbow lateral condyle fracture treated conservatively  She is currently at [facility]  Pain rated 5 out of 10  Tolerated left elbow posterior splint  No rubbing or skin breakdown  No numbness or tingling      5/2/222  OPERATION PERFORMED:  Open treatment of the left periprosthetic proximal femur fracture with internal fixation. 4/30/22  80 y.o. female seen in consultation at the request of Johanne Koch MD for evaluation of left hip fracture, left elbow frature:   Onset yesterday  Injury/trauma - pulling curtains, ground level fall  History of symptoms - hx of left hip hemiarthroplasty for fracture in 2016 with Dr Bentley Earl  Pain is located left elbow and left hip diffuse  Worse with any ROM  Better with rest, pain meds  Associated with N/A  Neck pain = no  Radicular symptoms = no  Numbness and/or tingling = no  Uses walker for ambulation  Lives alone  No DM  No CAD  Does not smoke    Past Medical History:   Diagnosis Date    Acquired hypothyroidism 4/30/2022    Arthritis     Endometrial thickening on ultrasound     Glaucoma     Hyperlipidemia     Hypertension     Osteoporosis with current pathological fracture, initial encounter 4/30/2022    Pneumonia     Post-menopausal bleeding     TIA (transient ischemic attack)        Past Surgical History:   Procedure Laterality Date    BREAST SURGERY      left lumpectomy    HIP ARTHROPLASTY Left 08/26/2016    hemiarthroplasty left hip    HIP FRACTURE SURGERY Left 5/2/2022    OPEN TREATMENT LEFT PERIPROSTHETIC FEMUR FRACTURE WITH INTERNAL FIXATION performed by Alma Mann MD at [address], PARTIAL      VARICOSE VEIN SURGERY         Allergies   Allergen Reactions    Sulfa Antibiotics Nausea Only       Current Outpatient Medications   Medication Instructions    amLODIPine (NORVASC) 5 mg, Oral, DAILY    apixaban (ELIQUIS) 2.5 mg, Oral, 2 TIMES DAILY    aspirin 81 mg, Oral, DAILY, HOLD while on Eliquis    benazepril (LOTENSIN) 20 mg, Oral, DAILY    clobetasol (TEMOVATE) 0.05 % ointment Apply topically 2 times daily.  dorzolamide-timolol (COSOPT) 22.3-6.8 MG/ML ophthalmic solution 1 drop, Both Eyes, 2 TIMES DAILY    levothyroxine (SYNTHROID) 75 mcg, Oral, DAILY    lovastatin (MEVACOR) 40 mg, NIGHTLY    LUMIGAN 0.01 % SOLN ophthalmic drops 1 drop, Both Eyes, EVERY EVENING    omeprazole (PRILOSEC) 20 mg, DAILY    vitamin D (CHOLECALCIFEROL) 5,000 Units, Oral, DAILY       Vitals:    06/16/22 1334   Weight: 109 lb (49.4 kg)   Height: 5' (1.524 m)       Physical Exam:  Body mass index is 21.29 kg/m². NAD, family present  Left hip/thigh - lateral surgical incision well-healed   No erythema or drainage   No swelling seen   No TTP left hip   Negative logroll  LLE SILT SP/DP/T/LFC nerve distributions; EHL/FHL/TA/GS intact  Left elbow - long-arm cast removed   Skin intact, no ulceration or breakdown   mild TTP lateral elbow   Gentle ROM intact  LUE SILT M/U/R/A/LABC nerve distributions; AIN/PIN/IO intact   Rad pulse intact      Imaging:  Images were personally reviewed by myself and discussed with the patient  AP pelvis and Left hip 2 views performed 6/16/22 - left periprosthetic proximal femur fracture stabilized with hook plate and 3 cerclage cables. Alignment/reduction unchanged. Femoral stem appears stable as well, no interval migration.     Left elbow 2 views performed 6/16/22 - stable mild displacement of the lateral condyle fracture - laterally and superiorly. Interval fracture callus/healing is seen. The elbow joint alignment including the radiocapitellar articulation remains reduced on orthogonal imaging. Assessment & Plan:  80 y.o. female following up for   Diagnosis Orders   1. Closed fracture of lateral condyle of left elbow, with routine healing, subsequent encounter  XR ELBOW LEFT (2 VIEWS)    Breg Elbow T-Scope Brace   2. Periprosthetic fracture around internal prosthetic left hip joint, subsequent encounter  XR HIP 2-3 VW W PELVIS LEFT           Procedures    Breg Elbow T-Scope Brace     Patient was prescribed a Breg Elbow T-Scope Brace. The left elbow will require stabilization / immobilization from this semi-rigid / rigid orthosis to improve their function. The orthosis will assist in protecting the affected area, provide functional support and facilitate healing. The prefabricated orthosis was modified in the following manner to provide a customizable fit for the patient at the time of delivery. 1.  Identification of appropriate positioning and alignment of anatomical landmarks. 2.  Trimming of straps and adjustment of frame to fit patient. 3.  Polycentric hinge adjustments in flexion and extension. The patient was educated and fit by a healthcare professional with expert knowledge and specialization in brace application while under the direct supervision of the treating physician. Verbal and written instructions for the use of and application of this item were provided. They were instructed to contact the office immediately should the brace result in increased pain, decreased sensation, increased swelling or worsening of the condition.        Left hip/thigh -   Advance LLE full WBAT  Fall precautions    Left elbow -   Radiographs show stable alignment, elbow joint reduced  I recommend continued conservative care  Elbow T-scope fitted, fully unlocked for full ROM  LUE NWB until lateral condyle fracture consolidates further    FU in 1 month with repeat left hip and elbow XRs    Nguyen Ann MD

## 2022-06-17 ENCOUNTER — TELEPHONE (OUTPATIENT)
Dept: ORTHOPEDIC SURGERY | Age: 87
End: 2022-06-17

## 2022-06-17 NOTE — TELEPHONE ENCOUNTER
DR. Shaye Henriquez, RN at the patient's facility is concerned about the following: \" swelling at the inter acspect of the elbow and the pain in the fingers. \"    Are you concerned with her findings or would you recommend any other interventions?

## 2022-06-17 NOTE — TELEPHONE ENCOUNTER
Called to speak with Paramjit Kelly RN to try to answer her concerns about brace. Paramjit Kelly states \"patient is having a lot of swelling on the interactpects of the ellow and pain in the finger. She is concerned that the strapes are to tight on the arm. \"     Informed her she can loosen the straps ever so slightly to see if that helps but explained to her not to loose the allignment of the brace and to make sure it is still fitting the way she sees it currently. She verbally expressed understanding about the brace and the importance of not changing the fit. Told her that  is allowing the brace to be unlocked with full ROM that she can try to have the patient elevate and ice the area to help with swelling and pain. Apply the ice wrapped in a towel for 15 minutes and allow the skin to rest for 20 minutes after. Paramjit Kelly verbally understood this as well. Told shawna I would still pass on the concerns of the swelling at the inter acspect of the elbow and the pain in the fingers to  and if he would recommend anything different we would call her back.

## 2022-06-17 NOTE — TELEPHONE ENCOUNTER
Kristin, [address]  [phone]    [facility] called in today with some questions/ concerns. Patient was seen in the office yesterday and came back to the NH with a new brace. Pt c/o of swelling and discomfort with new brace. [facility] would like to discuss.

## 2023-03-22 NOTE — PLAN OF CARE
Addended by: STEVEN KEARNEY on: 3/22/2023 10:57 AM     Modules accepted: Orders     Problem: Discharge Planning  Goal: Discharge to home or other facility with appropriate resources  Outcome: Progressing  Flowsheets  Taken 5/4/2022 0838  Discharge to home or other facility with appropriate resources:   Identify barriers to discharge with patient and caregiver   Identify discharge learning needs (meds, wound care, etc)  Taken 5/3/2022 2136  Discharge to home or other facility with appropriate resources: Identify barriers to discharge with patient and caregiver     Problem: Safety - Adult  Goal: Free from fall injury  Outcome: Progressing  Flowsheets (Taken 5/4/2022 0838)  Free From Fall Injury: Instruct family/caregiver on patient safety     Problem: ABCDS Injury Assessment  Goal: Absence of physical injury  Outcome: Progressing  Note: Patient remains free from physical injury. Patient educated on safety precautions. Will continue to monitor to ensure patient remains free from physical injury throughout remainder of shift. Problem: Skin/Tissue Integrity  Goal: Absence of new skin breakdown  Description: 1. Monitor for areas of redness and/or skin breakdown  2. Assess vascular access sites hourly  3. Every 4-6 hours minimum:  Change oxygen saturation probe site  4. Every 4-6 hours:  If on nasal continuous positive airway pressure, respiratory therapy assess nares and determine need for appliance change or resting period. Outcome: Progressing  Note: Skin assessment complete. No new signs of skin breakdown noted. Repositioning patient with pillows at two hour intervals. Heels elevated off bed.       Problem: Pain  Goal: Verbalizes/displays adequate comfort level or baseline comfort level  Outcome: Progressing  Flowsheets (Taken 5/4/2022 0838)  Verbalizes/displays adequate comfort level or baseline comfort level:   Encourage patient to monitor pain and request assistance   Assess pain using appropriate pain scale

## (undated) DEVICE — STAPLER SKIN H3.9MM WIRE DIA0.58MM CRWN 6.9MM 35 STPL FIX

## (undated) DEVICE — SUTURE VCRL + SZ 2-0 L18IN ABSRB UD CT1 L36MM 1/2 CIR VCP839D

## (undated) DEVICE — 3M™ COBAN™ NL STERILE NON-LATEX SELF-ADHERENT WRAP, 2084S, 4 IN X 5 YD (10 CM X 4,5 M), 18 ROLLS/CASE: Brand: 3M™ COBAN™

## (undated) DEVICE — SUTURE VCRL + SZ 1 L18IN ABSRB UD L36MM CT-1 1/2 CIR VCP841D

## (undated) DEVICE — SOLUTION IV IRRIG POUR BRL 0.9% SODIUM CHL 2F7124

## (undated) DEVICE — C-ARM: Brand: UNBRANDED

## (undated) DEVICE — INTENDED FOR TISSUE SEPARATION, AND OTHER PROCEDURES THAT REQUIRE A SHARP SURGICAL BLADE TO PUNCTURE OR CUT.: Brand: BARD-PARKER ® STAINLESS STEEL BLADES

## (undated) DEVICE — GLOVE SURG SZ 8 L12IN FNGR THK79MIL GRN LTX FREE

## (undated) DEVICE — NEEDLE HYPO 18GA L1.5IN THN WALL PIVOTING SHLD BVL ORIENTED

## (undated) DEVICE — GOWN,SIRUS,POLYRNF,BRTHSLV,XL,30/CS: Brand: MEDLINE

## (undated) DEVICE — HIP PINNING: Brand: MEDLINE INDUSTRIES, INC.

## (undated) DEVICE — 3M™ IOBAN™ 2 ANTIMICROBIAL INCISE DRAPE 6640EZ: Brand: IOBAN™ 2

## (undated) DEVICE — PIN FIX L4.5MM S STL CERCLAGE THRD POS

## (undated) DEVICE — GLOVE SURG SZ 75 CRM LTX FREE POLYISOPRENE POLYMER BEAD ANTI

## (undated) DEVICE — NEEDLE HYPO 18GA L1.5IN PNK POLYPR HUB S STL SHT BVL STR